# Patient Record
Sex: MALE | Race: WHITE | NOT HISPANIC OR LATINO
[De-identification: names, ages, dates, MRNs, and addresses within clinical notes are randomized per-mention and may not be internally consistent; named-entity substitution may affect disease eponyms.]

---

## 2022-07-07 ENCOUNTER — APPOINTMENT (OUTPATIENT)
Dept: PULMONOLOGY | Facility: CLINIC | Age: 72
End: 2022-07-07

## 2023-06-23 ENCOUNTER — OFFICE (OUTPATIENT)
Dept: URBAN - METROPOLITAN AREA CLINIC 94 | Facility: CLINIC | Age: 73
Setting detail: OPHTHALMOLOGY
End: 2023-06-23
Payer: MEDICARE

## 2023-06-23 DIAGNOSIS — H25.13: ICD-10-CM

## 2023-06-23 DIAGNOSIS — H35.033: ICD-10-CM

## 2023-06-23 PROCEDURE — 92250 FUNDUS PHOTOGRAPHY W/I&R: CPT | Performed by: OPHTHALMOLOGY

## 2023-06-23 PROCEDURE — 99203 OFFICE O/P NEW LOW 30 MIN: CPT | Performed by: OPHTHALMOLOGY

## 2023-06-23 ASSESSMENT — REFRACTION_CURRENTRX
OD_VPRISM_DIRECTION: SV
OS_SPHERE: +0.75
OS_VPRISM_DIRECTION: SV
OD_CYLINDER: -0.50
OS_OVR_VA: 20/
OS_AXIS: 088
OD_VPRISM_DIRECTION: SV
OS_SPHERE: +3.75
OD_SPHERE: +0.50
OS_CYLINDER: -0.75
OD_AXIS: 092
OD_CYLINDER: -0.50
OS_CYLINDER: -0.75
OS_VPRISM_DIRECTION: SV
OD_OVR_VA: 20/
OD_SPHERE: +3.50
OD_AXIS: 092
OS_OVR_VA: 20/
OD_OVR_VA: 20/
OS_AXIS: 087

## 2023-06-23 ASSESSMENT — TONOMETRY
OD_IOP_MMHG: 15
OS_IOP_MMHG: 16

## 2023-06-23 ASSESSMENT — CONFRONTATIONAL VISUAL FIELD TEST (CVF)
OS_FINDINGS: FULL
OD_FINDINGS: FULL

## 2023-06-23 ASSESSMENT — AXIALLENGTH_DERIVED
OS_AL: 25.1414
OD_AL: 27.2275

## 2023-06-23 ASSESSMENT — REFRACTION_AUTOREFRACTION
OD_CYLINDER: -6.25
OD_AXIS: 146
OS_AXIS: 107
OS_SPHERE: -0.25
OS_CYLINDER: -2.25
OD_SPHERE: -2.25

## 2023-06-23 ASSESSMENT — KERATOMETRY
OD_K2POWER_DIOPTERS: 40.75
OS_AXISANGLE_DEGREES: 157
OD_K1POWER_DIOPTERS: 40.50
OS_K1POWER_DIOPTERS: 40.75
OD_AXISANGLE_DEGREES: 020
OS_K2POWER_DIOPTERS: 41.25

## 2023-06-23 ASSESSMENT — SPHEQUIV_DERIVED
OD_SPHEQUIV: -5.375
OS_SPHEQUIV: -1.375

## 2023-06-23 ASSESSMENT — VISUAL ACUITY
OS_BCVA: 20/50-1
OD_BCVA: 20/50+1

## 2023-07-12 ENCOUNTER — OFFICE (OUTPATIENT)
Dept: URBAN - METROPOLITAN AREA CLINIC 94 | Facility: CLINIC | Age: 73
Setting detail: OPHTHALMOLOGY
End: 2023-07-12
Payer: MEDICARE

## 2023-07-12 DIAGNOSIS — H25.13: ICD-10-CM

## 2023-07-12 DIAGNOSIS — H25.12: ICD-10-CM

## 2023-07-12 DIAGNOSIS — H35.033: ICD-10-CM

## 2023-07-12 DIAGNOSIS — H35.373: ICD-10-CM

## 2023-07-12 DIAGNOSIS — H18.513: ICD-10-CM

## 2023-07-12 PROBLEM — H25.11 CATARACT SENILE NUCLEAR SCLEROSIS; RIGHT EYE, LEFT EYE, BOTH EYES: Status: ACTIVE | Noted: 2023-07-12

## 2023-07-12 PROCEDURE — 92286 ANT SGM IMG I&R SPECLR MIC: CPT | Performed by: OPHTHALMOLOGY

## 2023-07-12 PROCEDURE — 99214 OFFICE O/P EST MOD 30 MIN: CPT | Performed by: OPHTHALMOLOGY

## 2023-07-12 PROCEDURE — 92134 CPTRZ OPH DX IMG PST SGM RTA: CPT | Performed by: OPHTHALMOLOGY

## 2023-07-12 PROCEDURE — 92136 OPHTHALMIC BIOMETRY: CPT | Performed by: OPHTHALMOLOGY

## 2023-07-12 ASSESSMENT — REFRACTION_CURRENTRX
OD_OVR_VA: 20/
OS_AXIS: 088
OS_CYLINDER: -0.75
OS_OVR_VA: 20/
OD_OVR_VA: 20/
OD_AXIS: 092
OD_SPHERE: +3.50
OS_CYLINDER: -0.75
OS_VPRISM_DIRECTION: SV
OD_CYLINDER: -0.50
OS_SPHERE: +3.75
OD_CYLINDER: -0.50
OD_VPRISM_DIRECTION: SV
OD_VPRISM_DIRECTION: SV
OD_SPHERE: +0.50
OS_VPRISM_DIRECTION: SV
OD_AXIS: 092
OS_SPHERE: +0.75
OS_OVR_VA: 20/
OS_AXIS: 087

## 2023-07-12 ASSESSMENT — KERATOMETRY
OD_K1POWER_DIOPTERS: 40.00
OD_K2POWER_DIOPTERS: 40.50
OS_K1POWER_DIOPTERS: 40.25
OD_AXISANGLE_DEGREES: 172
OS_K1K2_AVERAGE: 41.125
OS_AXISANGLE2_DEGREES: 165
OD_AXISANGLE_DEGREES: 172
OD_K1POWER_DIOPTERS: 40.00
OS_CYLAXISANGLE_DEGREES: 165
OS_K2POWER_DIOPTERS: 42.00
OD_K2POWER_DIOPTERS: 40.50
OS_AXISANGLE_DEGREES: 165
OS_K2POWER_DIOPTERS: 42.00
OD_K1K2_AVERAGE: 40.25
OS_K1POWER_DIOPTERS: 40.25
OD_AXISANGLE2_DEGREES: 172
OS_AXISANGLE_DEGREES: 165
OS_CYLPOWER_DEGREES: 1.75
OD_CYLPOWER_DEGREES: 0.5
OD_CYLAXISANGLE_DEGREES: 172

## 2023-07-12 ASSESSMENT — VISUAL ACUITY
OD_BCVA: 20/50-1
OS_BCVA: 20/80-1

## 2023-07-12 ASSESSMENT — REFRACTION_AUTOREFRACTION
OS_SPHERE: -0.25
OS_CYLINDER: -2.25
OD_CYLINDER: -6.25
OS_AXIS: 107
OD_SPHERE: -2.25
OD_AXIS: 146

## 2023-07-12 ASSESSMENT — REFRACTION_MANIFEST
OD_SPHERE: -2.25
OS_SPHERE: -0.25
OS_AXIS: 105
OD_AXIS: 145
OS_VA1: 20/50
OD_CYLINDER: -6.25
OS_CYLINDER: -2.25
OD_VA1: 20/80

## 2023-07-12 ASSESSMENT — CORNEAL DYSTROPHY
OD_DYSTROPHY: PRESENT
OS_DYSTROPHY: PRESENT

## 2023-07-12 ASSESSMENT — AXIALLENGTH_DERIVED
OD_AL: 27.4122
OD_AL: 27.4122
OS_AL: 25.0893
OS_AL: 25.0893

## 2023-07-12 ASSESSMENT — SPHEQUIV_DERIVED
OS_SPHEQUIV: -1.375
OS_SPHEQUIV: -1.375
OD_SPHEQUIV: -5.375
OD_SPHEQUIV: -5.375

## 2023-07-12 ASSESSMENT — TONOMETRY
OD_IOP_MMHG: 15
OS_IOP_MMHG: 15

## 2023-08-17 ENCOUNTER — ASC (OUTPATIENT)
Dept: URBAN - METROPOLITAN AREA SURGERY 8 | Facility: SURGERY | Age: 73
Setting detail: OPHTHALMOLOGY
End: 2023-08-17
Payer: MEDICARE

## 2023-08-17 DIAGNOSIS — H25.12: ICD-10-CM

## 2023-08-17 DIAGNOSIS — H52.212: ICD-10-CM

## 2023-08-17 PROCEDURE — A9270 NON-COVERED ITEM OR SERVICE: HCPCS | Performed by: OPHTHALMOLOGY

## 2023-08-17 PROCEDURE — S9986 NOT MEDICALLY NECESSARY SVC: HCPCS | Performed by: OPHTHALMOLOGY

## 2023-08-17 PROCEDURE — FEMTO FEMTOSECOND LASER: Performed by: OPHTHALMOLOGY

## 2023-08-17 PROCEDURE — 66984 XCAPSL CTRC RMVL W/O ECP: CPT | Performed by: OPHTHALMOLOGY

## 2023-08-18 ENCOUNTER — RX ONLY (RX ONLY)
Age: 73
End: 2023-08-18

## 2023-08-18 ENCOUNTER — OFFICE (OUTPATIENT)
Dept: URBAN - METROPOLITAN AREA CLINIC 94 | Facility: CLINIC | Age: 73
Setting detail: OPHTHALMOLOGY
End: 2023-08-18
Payer: MEDICARE

## 2023-08-18 DIAGNOSIS — Z96.1: ICD-10-CM

## 2023-08-18 PROCEDURE — 99024 POSTOP FOLLOW-UP VISIT: CPT | Performed by: PHYSICIAN ASSISTANT

## 2023-08-18 ASSESSMENT — TONOMETRY
OD_IOP_MMHG: 10
OS_IOP_MMHG: 11

## 2023-08-18 ASSESSMENT — REFRACTION_MANIFEST
OS_SPHERE: -0.25
OS_VA1: 20/50
OD_SPHERE: -2.25
OS_CYLINDER: -2.25
OD_AXIS: 145
OD_VA1: 20/80
OS_AXIS: 105
OD_CYLINDER: -6.25

## 2023-08-18 ASSESSMENT — KERATOMETRY
OS_K2POWER_DIOPTERS: 41.25
OS_K1POWER_DIOPTERS: 40.75
OS_AXISANGLE_DEGREES: 082
OD_K1POWER_DIOPTERS: 40.50
OD_K2POWER_DIOPTERS: 40.75
OD_AXISANGLE_DEGREES: 157

## 2023-08-18 ASSESSMENT — REFRACTION_AUTOREFRACTION
OD_AXIS: 107
OD_CYLINDER: -1.00
OS_SPHERE: -0.25
OS_CYLINDER: -0.25
OD_SPHERE: -0.50
OS_AXIS: 093

## 2023-08-18 ASSESSMENT — REFRACTION_CURRENTRX
OS_AXIS: 087
OS_OVR_VA: 20/
OS_OVR_VA: 20/
OD_SPHERE: +0.50
OS_VPRISM_DIRECTION: SV
OD_VPRISM_DIRECTION: SV
OD_OVR_VA: 20/
OD_SPHERE: +3.50
OD_AXIS: 092
OD_VPRISM_DIRECTION: SV
OS_AXIS: 088
OD_OVR_VA: 20/
OD_AXIS: 092
OS_CYLINDER: -0.75
OS_SPHERE: +3.75
OS_CYLINDER: -0.75
OS_VPRISM_DIRECTION: SV
OD_CYLINDER: -0.50
OS_SPHERE: +0.75
OD_CYLINDER: -0.50

## 2023-08-18 ASSESSMENT — AXIALLENGTH_DERIVED
OD_AL: 27.2275
OD_AL: 25.1319
OS_AL: 24.7066
OS_AL: 25.1414

## 2023-08-18 ASSESSMENT — CONFRONTATIONAL VISUAL FIELD TEST (CVF)
OD_FINDINGS: FULL
OS_FINDINGS: FULL

## 2023-08-18 ASSESSMENT — VISUAL ACUITY
OD_BCVA: 20/30
OS_BCVA: 20/60

## 2023-08-18 ASSESSMENT — SPHEQUIV_DERIVED
OS_SPHEQUIV: -1.375
OD_SPHEQUIV: -1
OD_SPHEQUIV: -5.375
OS_SPHEQUIV: -0.375

## 2023-08-18 ASSESSMENT — CORNEAL DYSTROPHY
OD_DYSTROPHY: PRESENT
OS_DYSTROPHY: PRESENT

## 2023-08-23 ENCOUNTER — OFFICE (OUTPATIENT)
Dept: URBAN - METROPOLITAN AREA CLINIC 94 | Facility: CLINIC | Age: 73
Setting detail: OPHTHALMOLOGY
End: 2023-08-23
Payer: MEDICARE

## 2023-08-23 DIAGNOSIS — Z96.1: ICD-10-CM

## 2023-08-23 DIAGNOSIS — H25.11: ICD-10-CM

## 2023-08-23 PROCEDURE — 99024 POSTOP FOLLOW-UP VISIT: CPT | Performed by: PHYSICIAN ASSISTANT

## 2023-08-23 PROCEDURE — 92136 OPHTHALMIC BIOMETRY: CPT | Performed by: PHYSICIAN ASSISTANT

## 2023-08-23 ASSESSMENT — REFRACTION_CURRENTRX
OD_OVR_VA: 20/
OS_CYLINDER: -0.75
OD_OVR_VA: 20/
OD_SPHERE: +0.50
OD_SPHERE: +3.50
OD_VPRISM_DIRECTION: SV
OS_SPHERE: +0.75
OD_CYLINDER: -0.50
OD_AXIS: 092
OS_OVR_VA: 20/
OS_AXIS: 087
OD_VPRISM_DIRECTION: SV
OS_SPHERE: +3.75
OS_VPRISM_DIRECTION: SV
OS_CYLINDER: -0.75
OS_VPRISM_DIRECTION: SV
OD_AXIS: 092
OS_AXIS: 088
OS_OVR_VA: 20/
OD_CYLINDER: -0.50

## 2023-08-23 ASSESSMENT — REFRACTION_AUTOREFRACTION
OD_SPHERE: +0.75
OD_CYLINDER: -1.25
OS_CYLINDER: -1.00
OD_AXIS: 172
OS_SPHERE: 0.00
OS_AXIS: 088

## 2023-08-23 ASSESSMENT — VISUAL ACUITY
OS_BCVA: 20/60+1
OD_BCVA: 20/25-1

## 2023-08-23 ASSESSMENT — REFRACTION_MANIFEST
OS_AXIS: 088
OD_AXIS: 145
OS_CYLINDER: -2.25
OS_SPHERE: -0.25
OD_SPHERE: -2.25
OD_VA1: 20/80
OS_AXIS: 105
OS_CYLINDER: -0.50
OS_VA1: 20/20
OS_VA1: 20/50
OD_CYLINDER: -6.25
OS_SPHERE: PLANO

## 2023-08-23 ASSESSMENT — AXIALLENGTH_DERIVED
OD_AL: 24.4948
OD_AL: 27.0452

## 2023-08-23 ASSESSMENT — TONOMETRY
OD_IOP_MMHG: 11
OS_IOP_MMHG: 13

## 2023-08-23 ASSESSMENT — SPHEQUIV_DERIVED
OS_SPHEQUIV: -0.5
OD_SPHEQUIV: -5.375
OS_SPHEQUIV: -1.375
OD_SPHEQUIV: 0.125

## 2023-08-23 ASSESSMENT — CORNEAL DYSTROPHY
OS_DYSTROPHY: PRESENT
OD_DYSTROPHY: PRESENT

## 2023-08-23 ASSESSMENT — KERATOMETRY
OD_AXISANGLE_DEGREES: 160
OD_K2POWER_DIOPTERS: 41.25
OD_K1POWER_DIOPTERS: 40.75
OS_K1POWER_DIOPTERS: UTP

## 2023-08-23 ASSESSMENT — CONFRONTATIONAL VISUAL FIELD TEST (CVF)
OD_FINDINGS: FULL
OS_FINDINGS: FULL

## 2023-08-30 ENCOUNTER — ASC (OUTPATIENT)
Dept: URBAN - METROPOLITAN AREA SURGERY 8 | Facility: SURGERY | Age: 73
Setting detail: OPHTHALMOLOGY
End: 2023-08-30
Payer: MEDICARE

## 2023-08-30 DIAGNOSIS — H52.211: ICD-10-CM

## 2023-08-30 DIAGNOSIS — H25.11: ICD-10-CM

## 2023-08-30 PROCEDURE — A9270 NON-COVERED ITEM OR SERVICE: HCPCS | Performed by: OPHTHALMOLOGY

## 2023-08-30 PROCEDURE — 66984 XCAPSL CTRC RMVL W/O ECP: CPT | Performed by: OPHTHALMOLOGY

## 2023-08-30 PROCEDURE — FEMTO FEMTOSECOND LASER: Performed by: OPHTHALMOLOGY

## 2023-08-30 PROCEDURE — S9986 NOT MEDICALLY NECESSARY SVC: HCPCS | Performed by: OPHTHALMOLOGY

## 2023-08-31 ENCOUNTER — OFFICE (OUTPATIENT)
Dept: URBAN - METROPOLITAN AREA CLINIC 112 | Facility: CLINIC | Age: 73
Setting detail: OPHTHALMOLOGY
End: 2023-08-31
Payer: MEDICARE

## 2023-08-31 DIAGNOSIS — Z96.1: ICD-10-CM

## 2023-08-31 PROBLEM — H25.11 CATARACT SENILE NUCLEAR SCLEROSIS; RIGHT EYE,: Status: RESOLVED | Noted: 2023-08-18 | Resolved: 2023-08-31

## 2023-08-31 PROCEDURE — 99024 POSTOP FOLLOW-UP VISIT: CPT | Performed by: PHYSICIAN ASSISTANT

## 2023-08-31 ASSESSMENT — REFRACTION_MANIFEST
OS_VA1: 20/20
OS_CYLINDER: -2.25
OS_SPHERE: PLANO
OD_VA1: 20/80
OS_VA1: 20/50
OD_AXIS: 145
OS_AXIS: 105
OD_CYLINDER: -6.25
OS_CYLINDER: -0.50
OS_AXIS: 088
OS_SPHERE: -0.25
OD_SPHERE: -2.25

## 2023-08-31 ASSESSMENT — SPHEQUIV_DERIVED
OD_SPHEQUIV: 0.125
OS_SPHEQUIV: -1.375
OD_SPHEQUIV: -5.375
OS_SPHEQUIV: -0.75

## 2023-08-31 ASSESSMENT — REFRACTION_CURRENTRX
OS_CYLINDER: -0.75
OS_VPRISM_DIRECTION: SV
OD_VPRISM_DIRECTION: SV
OS_CYLINDER: -0.75
OD_VPRISM_DIRECTION: SV
OD_OVR_VA: 20/
OD_CYLINDER: -0.50
OS_AXIS: 087
OS_SPHERE: +0.75
OD_CYLINDER: -0.50
OD_AXIS: 092
OS_OVR_VA: 20/
OS_VPRISM_DIRECTION: SV
OS_OVR_VA: 20/
OD_SPHERE: +3.50
OD_AXIS: 092
OS_SPHERE: +3.75
OD_OVR_VA: 20/
OS_AXIS: 088
OD_SPHERE: +0.50

## 2023-08-31 ASSESSMENT — CONFRONTATIONAL VISUAL FIELD TEST (CVF)
OD_FINDINGS: FULL
OS_FINDINGS: FULL

## 2023-08-31 ASSESSMENT — REFRACTION_AUTOREFRACTION
OD_SPHERE: +0.50
OS_CYLINDER: -0.50
OD_CYLINDER: -0.75
OS_AXIS: 078
OS_SPHERE: -0.50
OD_AXIS: 118

## 2023-08-31 ASSESSMENT — TONOMETRY
OD_IOP_MMHG: 12
OS_IOP_MMHG: 10

## 2023-08-31 ASSESSMENT — CORNEAL DYSTROPHY
OS_DYSTROPHY: PRESENT
OD_DYSTROPHY: PRESENT

## 2023-08-31 ASSESSMENT — SUPERFICIAL PUNCTATE KERATITIS (SPK)
OS_SPK: 1+ 2+
OD_SPK: 1+ 2+

## 2023-08-31 ASSESSMENT — VISUAL ACUITY
OD_BCVA: 20/25+1
OS_BCVA: 20/25-1

## 2023-09-07 ENCOUNTER — OFFICE (OUTPATIENT)
Dept: URBAN - METROPOLITAN AREA CLINIC 112 | Facility: CLINIC | Age: 73
Setting detail: OPHTHALMOLOGY
End: 2023-09-07
Payer: MEDICARE

## 2023-09-07 DIAGNOSIS — Z96.1: ICD-10-CM

## 2023-09-07 DIAGNOSIS — H16.223: ICD-10-CM

## 2023-09-07 PROCEDURE — 99024 POSTOP FOLLOW-UP VISIT: CPT | Performed by: PHYSICIAN ASSISTANT

## 2023-09-07 ASSESSMENT — KERATOMETRY
OS_AXISANGLE_DEGREES: 106
OD_K1POWER_DIOPTERS: 40.50
OD_K2POWER_DIOPTERS: 41.00
OD_AXISANGLE_DEGREES: 010
OS_K1POWER_DIOPTERS: 41.00
OS_K2POWER_DIOPTERS: 41.50

## 2023-09-07 ASSESSMENT — REFRACTION_MANIFEST
OD_SPHERE: -2.25
OD_VA1: 20/80
OD_AXIS: 145
OS_SPHERE: PLANO
OD_CYLINDER: -6.25
OS_CYLINDER: -2.25
OS_VA1: 20/50
OS_CYLINDER: -0.50
OS_AXIS: 105
OS_AXIS: 088
OS_SPHERE: -0.25
OS_VA1: 20/20

## 2023-09-07 ASSESSMENT — REFRACTION_CURRENTRX
OD_VPRISM_DIRECTION: SV
OS_VPRISM_DIRECTION: SV
OS_AXIS: 088
OD_CYLINDER: -0.50
OS_AXIS: 087
OS_CYLINDER: -0.75
OS_SPHERE: +0.75
OD_CYLINDER: -0.50
OS_CYLINDER: -0.75
OD_AXIS: 092
OS_SPHERE: +3.75
OD_AXIS: 092
OD_SPHERE: +3.50
OD_SPHERE: +0.50
OS_VPRISM_DIRECTION: SV
OD_OVR_VA: 20/
OS_OVR_VA: 20/
OD_OVR_VA: 20/
OD_VPRISM_DIRECTION: SV
OS_OVR_VA: 20/

## 2023-09-07 ASSESSMENT — CORNEAL DYSTROPHY
OD_DYSTROPHY: PRESENT
OS_DYSTROPHY: PRESENT

## 2023-09-07 ASSESSMENT — SPHEQUIV_DERIVED
OS_SPHEQUIV: -1.375
OD_SPHEQUIV: -5.375
OD_SPHEQUIV: -0.125
OS_SPHEQUIV: -0.75

## 2023-09-07 ASSESSMENT — REFRACTION_AUTOREFRACTION
OS_CYLINDER: -0.50
OD_AXIS: 089
OS_AXIS: 061
OD_SPHERE: +0.25
OD_CYLINDER: -0.75
OS_SPHERE: -0.50

## 2023-09-07 ASSESSMENT — SUPERFICIAL PUNCTATE KERATITIS (SPK)
OD_SPK: 1+ 2+
OS_SPK: 1+ 2+

## 2023-09-07 ASSESSMENT — VISUAL ACUITY
OD_BCVA: 20/40
OS_BCVA: 20/25

## 2023-09-07 ASSESSMENT — AXIALLENGTH_DERIVED
OS_AL: 24.7662
OD_AL: 24.7005
OD_AL: 27.1665
OS_AL: 25.0375

## 2023-09-07 ASSESSMENT — TONOMETRY
OD_IOP_MMHG: 11
OS_IOP_MMHG: 12

## 2023-09-07 ASSESSMENT — CONFRONTATIONAL VISUAL FIELD TEST (CVF)
OS_FINDINGS: FULL
OD_FINDINGS: FULL

## 2023-10-05 ENCOUNTER — OFFICE (OUTPATIENT)
Dept: URBAN - METROPOLITAN AREA CLINIC 112 | Facility: CLINIC | Age: 73
Setting detail: OPHTHALMOLOGY
End: 2023-10-05
Payer: MEDICARE

## 2023-10-05 DIAGNOSIS — Z96.1: ICD-10-CM

## 2023-10-05 PROCEDURE — 99024 POSTOP FOLLOW-UP VISIT: CPT | Performed by: PHYSICIAN ASSISTANT

## 2023-10-05 ASSESSMENT — TONOMETRY
OD_IOP_MMHG: 16
OS_IOP_MMHG: 16

## 2023-10-05 ASSESSMENT — VISUAL ACUITY
OD_BCVA: 20/40+
OS_BCVA: 20/25

## 2023-10-05 ASSESSMENT — REFRACTION_MANIFEST
OS_AXIS: 088
OD_CYLINDER: -6.25
OS_AXIS: 105
OS_SPHERE: PLANO
OS_CYLINDER: -0.50
OD_AXIS: 145
OS_VA1: 20/50
OD_SPHERE: -2.25
OD_VA1: 20/80
OS_VA1: 20/20
OS_SPHERE: -0.25
OS_CYLINDER: -2.25

## 2023-10-05 ASSESSMENT — REFRACTION_CURRENTRX
OD_VPRISM_DIRECTION: SV
OS_OVR_VA: 20/
OD_CYLINDER: -0.50
OS_SPHERE: +0.75
OD_SPHERE: +0.50
OS_AXIS: 087
OD_SPHERE: +3.50
OS_AXIS: 088
OS_SPHERE: +3.75
OS_CYLINDER: -0.75
OD_CYLINDER: -0.50
OS_VPRISM_DIRECTION: SV
OS_CYLINDER: -0.75
OS_VPRISM_DIRECTION: SV
OD_VPRISM_DIRECTION: SV
OD_AXIS: 092
OD_OVR_VA: 20/
OD_OVR_VA: 20/
OS_OVR_VA: 20/
OD_AXIS: 092

## 2023-10-05 ASSESSMENT — REFRACTION_AUTOREFRACTION
OS_SPHERE: -0.50
OD_SPHERE: +0.25
OS_CYLINDER: -0.50
OS_AXIS: 061
OD_AXIS: 089
OD_CYLINDER: -0.75

## 2023-10-05 ASSESSMENT — CONFRONTATIONAL VISUAL FIELD TEST (CVF)
OS_FINDINGS: FULL
OD_FINDINGS: FULL

## 2023-10-05 ASSESSMENT — KERATOMETRY
OD_K2POWER_DIOPTERS: 41.00
OS_K1POWER_DIOPTERS: 41.00
OD_AXISANGLE_DEGREES: 010
OS_K2POWER_DIOPTERS: 41.50
OS_AXISANGLE_DEGREES: 106
OD_K1POWER_DIOPTERS: 40.50

## 2023-10-05 ASSESSMENT — AXIALLENGTH_DERIVED
OD_AL: 27.1665
OS_AL: 25.0375
OD_AL: 24.7005
OS_AL: 24.7662

## 2023-10-05 ASSESSMENT — CORNEAL DYSTROPHY
OD_DYSTROPHY: PRESENT
OS_DYSTROPHY: PRESENT

## 2023-10-05 ASSESSMENT — SUPERFICIAL PUNCTATE KERATITIS (SPK)
OD_SPK: 1+ 2+
OS_SPK: 1+ 2+

## 2023-10-05 ASSESSMENT — SPHEQUIV_DERIVED
OS_SPHEQUIV: -0.75
OD_SPHEQUIV: -5.375
OD_SPHEQUIV: -0.125
OS_SPHEQUIV: -1.375

## 2024-04-17 ENCOUNTER — INPATIENT (INPATIENT)
Facility: HOSPITAL | Age: 74
LOS: 4 days | Discharge: NURSING, REHAB OR LONG-TERM CARE | DRG: 602 | End: 2024-04-22
Attending: FAMILY MEDICINE | Admitting: STUDENT IN AN ORGANIZED HEALTH CARE EDUCATION/TRAINING PROGRAM
Payer: MEDICARE

## 2024-04-17 VITALS
OXYGEN SATURATION: 100 % | HEIGHT: 66 IN | DIASTOLIC BLOOD PRESSURE: 76 MMHG | SYSTOLIC BLOOD PRESSURE: 146 MMHG | HEART RATE: 73 BPM | TEMPERATURE: 98 F | RESPIRATION RATE: 16 BRPM | WEIGHT: 250 LBS

## 2024-04-17 DIAGNOSIS — L03.119 CELLULITIS OF UNSPECIFIED PART OF LIMB: ICD-10-CM

## 2024-04-17 DIAGNOSIS — Z95.5 PRESENCE OF CORONARY ANGIOPLASTY IMPLANT AND GRAFT: Chronic | ICD-10-CM

## 2024-04-17 LAB
ALBUMIN SERPL ELPH-MCNC: 3.3 G/DL — SIGNIFICANT CHANGE UP (ref 3.3–5.2)
ALP SERPL-CCNC: 113 U/L — SIGNIFICANT CHANGE UP (ref 40–120)
ALT FLD-CCNC: 12 U/L — SIGNIFICANT CHANGE UP
ANION GAP SERPL CALC-SCNC: 13 MMOL/L — SIGNIFICANT CHANGE UP (ref 5–17)
ANISOCYTOSIS BLD QL: SIGNIFICANT CHANGE UP
AST SERPL-CCNC: 15 U/L — SIGNIFICANT CHANGE UP
BASOPHILS # BLD AUTO: 0 K/UL — SIGNIFICANT CHANGE UP (ref 0–0.2)
BASOPHILS NFR BLD AUTO: 0 % — SIGNIFICANT CHANGE UP (ref 0–2)
BILIRUB SERPL-MCNC: 1.5 MG/DL — SIGNIFICANT CHANGE UP (ref 0.4–2)
BUN SERPL-MCNC: 38.3 MG/DL — HIGH (ref 8–20)
CALCIUM SERPL-MCNC: 8.7 MG/DL — SIGNIFICANT CHANGE UP (ref 8.4–10.5)
CHLORIDE SERPL-SCNC: 108 MMOL/L — SIGNIFICANT CHANGE UP (ref 96–108)
CO2 SERPL-SCNC: 20 MMOL/L — LOW (ref 22–29)
CREAT SERPL-MCNC: 2.21 MG/DL — HIGH (ref 0.5–1.3)
EGFR: 31 ML/MIN/1.73M2 — LOW
EOSINOPHIL # BLD AUTO: 0 K/UL — SIGNIFICANT CHANGE UP (ref 0–0.5)
EOSINOPHIL NFR BLD AUTO: 0 % — SIGNIFICANT CHANGE UP (ref 0–6)
GIANT PLATELETS BLD QL SMEAR: PRESENT — SIGNIFICANT CHANGE UP
GLUCOSE SERPL-MCNC: 89 MG/DL — SIGNIFICANT CHANGE UP (ref 70–99)
HCT VFR BLD CALC: 42.1 % — SIGNIFICANT CHANGE UP (ref 39–50)
HGB BLD-MCNC: 13.4 G/DL — SIGNIFICANT CHANGE UP (ref 13–17)
LYMPHOCYTES # BLD AUTO: 0.4 K/UL — LOW (ref 1–3.3)
LYMPHOCYTES # BLD AUTO: 4.3 % — LOW (ref 13–44)
MACROCYTES BLD QL: SIGNIFICANT CHANGE UP
MAGNESIUM SERPL-MCNC: 2.5 MG/DL — SIGNIFICANT CHANGE UP (ref 1.8–2.6)
MANUAL SMEAR VERIFICATION: SIGNIFICANT CHANGE UP
MCHC RBC-ENTMCNC: 31.8 GM/DL — LOW (ref 32–36)
MCHC RBC-ENTMCNC: 32.7 PG — SIGNIFICANT CHANGE UP (ref 27–34)
MCV RBC AUTO: 102.7 FL — HIGH (ref 80–100)
MONOCYTES # BLD AUTO: 0.32 K/UL — SIGNIFICANT CHANGE UP (ref 0–0.9)
MONOCYTES NFR BLD AUTO: 3.5 % — SIGNIFICANT CHANGE UP (ref 2–14)
NEUTROPHILS # BLD AUTO: 8.52 K/UL — HIGH (ref 1.8–7.4)
NEUTROPHILS NFR BLD AUTO: 92.2 % — HIGH (ref 43–77)
NT-PROBNP SERPL-SCNC: HIGH PG/ML (ref 0–300)
OVALOCYTES BLD QL SMEAR: SLIGHT — SIGNIFICANT CHANGE UP
PHOSPHATE SERPL-MCNC: 3.3 MG/DL — SIGNIFICANT CHANGE UP (ref 2.4–4.7)
PLAT MORPH BLD: NORMAL — SIGNIFICANT CHANGE UP
PLATELET # BLD AUTO: 137 K/UL — LOW (ref 150–400)
POIKILOCYTOSIS BLD QL AUTO: SLIGHT — SIGNIFICANT CHANGE UP
POLYCHROMASIA BLD QL SMEAR: SLIGHT — SIGNIFICANT CHANGE UP
POTASSIUM SERPL-MCNC: 4.7 MMOL/L — SIGNIFICANT CHANGE UP (ref 3.5–5.3)
POTASSIUM SERPL-SCNC: 4.7 MMOL/L — SIGNIFICANT CHANGE UP (ref 3.5–5.3)
PROT SERPL-MCNC: 7.4 G/DL — SIGNIFICANT CHANGE UP (ref 6.6–8.7)
RBC # BLD: 4.1 M/UL — LOW (ref 4.2–5.8)
RBC # FLD: 21.5 % — HIGH (ref 10.3–14.5)
RBC BLD AUTO: ABNORMAL
SODIUM SERPL-SCNC: 141 MMOL/L — SIGNIFICANT CHANGE UP (ref 135–145)
TROPONIN T, HIGH SENSITIVITY RESULT: 32 NG/L — SIGNIFICANT CHANGE UP (ref 0–51)
WBC # BLD: 9.24 K/UL — SIGNIFICANT CHANGE UP (ref 3.8–10.5)
WBC # FLD AUTO: 9.24 K/UL — SIGNIFICANT CHANGE UP (ref 3.8–10.5)

## 2024-04-17 PROCEDURE — 70450 CT HEAD/BRAIN W/O DYE: CPT | Mod: 26,MC

## 2024-04-17 PROCEDURE — 99285 EMERGENCY DEPT VISIT HI MDM: CPT

## 2024-04-17 PROCEDURE — 99497 ADVNCD CARE PLAN 30 MIN: CPT | Mod: 25

## 2024-04-17 PROCEDURE — 93970 EXTREMITY STUDY: CPT | Mod: 26

## 2024-04-17 PROCEDURE — 71045 X-RAY EXAM CHEST 1 VIEW: CPT | Mod: 26

## 2024-04-17 PROCEDURE — 99223 1ST HOSP IP/OBS HIGH 75: CPT

## 2024-04-17 PROCEDURE — 73562 X-RAY EXAM OF KNEE 3: CPT | Mod: 26,50

## 2024-04-17 RX ORDER — ALLOPURINOL 300 MG
0.5 TABLET ORAL
Refills: 0 | DISCHARGE

## 2024-04-17 RX ORDER — APIXABAN 2.5 MG/1
5 TABLET, FILM COATED ORAL EVERY 12 HOURS
Refills: 0 | Status: DISCONTINUED | OUTPATIENT
Start: 2024-04-17 | End: 2024-04-22

## 2024-04-17 RX ORDER — SODIUM BICARBONATE 1 MEQ/ML
650 SYRINGE (ML) INTRAVENOUS
Refills: 0 | Status: DISCONTINUED | OUTPATIENT
Start: 2024-04-17 | End: 2024-04-22

## 2024-04-17 RX ORDER — AMIODARONE HYDROCHLORIDE 400 MG/1
1 TABLET ORAL
Refills: 0 | DISCHARGE

## 2024-04-17 RX ORDER — APIXABAN 2.5 MG/1
1 TABLET, FILM COATED ORAL
Refills: 0 | DISCHARGE

## 2024-04-17 RX ORDER — TAMSULOSIN HYDROCHLORIDE 0.4 MG/1
0.4 CAPSULE ORAL AT BEDTIME
Refills: 0 | Status: DISCONTINUED | OUTPATIENT
Start: 2024-04-17 | End: 2024-04-22

## 2024-04-17 RX ORDER — FUROSEMIDE 40 MG
40 TABLET ORAL DAILY
Refills: 0 | Status: DISCONTINUED | OUTPATIENT
Start: 2024-04-17 | End: 2024-04-19

## 2024-04-17 RX ORDER — TAMSULOSIN HYDROCHLORIDE 0.4 MG/1
1 CAPSULE ORAL
Refills: 0 | DISCHARGE

## 2024-04-17 RX ORDER — FOLIC ACID 0.8 MG
1 TABLET ORAL
Refills: 0 | DISCHARGE

## 2024-04-17 RX ORDER — ONDANSETRON 8 MG/1
4 TABLET, FILM COATED ORAL EVERY 8 HOURS
Refills: 0 | Status: DISCONTINUED | OUTPATIENT
Start: 2024-04-17 | End: 2024-04-22

## 2024-04-17 RX ORDER — SIMVASTATIN 20 MG/1
40 TABLET, FILM COATED ORAL AT BEDTIME
Refills: 0 | Status: DISCONTINUED | OUTPATIENT
Start: 2024-04-17 | End: 2024-04-22

## 2024-04-17 RX ORDER — SIMVASTATIN 20 MG/1
1 TABLET, FILM COATED ORAL
Refills: 0 | DISCHARGE

## 2024-04-17 RX ORDER — FOLIC ACID 0.8 MG
1 TABLET ORAL DAILY
Refills: 0 | Status: DISCONTINUED | OUTPATIENT
Start: 2024-04-17 | End: 2024-04-22

## 2024-04-17 RX ORDER — FERROUS SULFATE 325(65) MG
1 TABLET ORAL
Refills: 0 | DISCHARGE

## 2024-04-17 RX ORDER — METOPROLOL TARTRATE 50 MG
50 TABLET ORAL
Refills: 0 | Status: DISCONTINUED | OUTPATIENT
Start: 2024-04-17 | End: 2024-04-20

## 2024-04-17 RX ORDER — LANOLIN ALCOHOL/MO/W.PET/CERES
3 CREAM (GRAM) TOPICAL AT BEDTIME
Refills: 0 | Status: DISCONTINUED | OUTPATIENT
Start: 2024-04-17 | End: 2024-04-22

## 2024-04-17 RX ORDER — ALLOPURINOL 300 MG
50 TABLET ORAL DAILY
Refills: 0 | Status: DISCONTINUED | OUTPATIENT
Start: 2024-04-17 | End: 2024-04-22

## 2024-04-17 RX ORDER — PIPERACILLIN AND TAZOBACTAM 4; .5 G/20ML; G/20ML
3.38 INJECTION, POWDER, LYOPHILIZED, FOR SOLUTION INTRAVENOUS ONCE
Refills: 0 | Status: COMPLETED | OUTPATIENT
Start: 2024-04-17 | End: 2024-04-17

## 2024-04-17 RX ORDER — VANCOMYCIN HCL 1 G
1000 VIAL (EA) INTRAVENOUS ONCE
Refills: 0 | Status: COMPLETED | OUTPATIENT
Start: 2024-04-17 | End: 2024-04-17

## 2024-04-17 RX ORDER — ASCORBIC ACID 60 MG
1 TABLET,CHEWABLE ORAL
Refills: 0 | DISCHARGE

## 2024-04-17 RX ORDER — AMIODARONE HYDROCHLORIDE 400 MG/1
200 TABLET ORAL DAILY
Refills: 0 | Status: DISCONTINUED | OUTPATIENT
Start: 2024-04-17 | End: 2024-04-22

## 2024-04-17 RX ORDER — SODIUM BICARBONATE 1 MEQ/ML
1 SYRINGE (ML) INTRAVENOUS
Refills: 0 | DISCHARGE

## 2024-04-17 RX ORDER — ACETAMINOPHEN 500 MG
650 TABLET ORAL EVERY 6 HOURS
Refills: 0 | Status: DISCONTINUED | OUTPATIENT
Start: 2024-04-17 | End: 2024-04-22

## 2024-04-17 RX ORDER — ASCORBIC ACID 60 MG
500 TABLET,CHEWABLE ORAL DAILY
Refills: 0 | Status: DISCONTINUED | OUTPATIENT
Start: 2024-04-17 | End: 2024-04-22

## 2024-04-17 RX ORDER — CEFAZOLIN SODIUM 1 G
1000 VIAL (EA) INJECTION EVERY 8 HOURS
Refills: 0 | Status: DISCONTINUED | OUTPATIENT
Start: 2024-04-17 | End: 2024-04-22

## 2024-04-17 RX ORDER — FERROUS SULFATE 325(65) MG
325 TABLET ORAL DAILY
Refills: 0 | Status: DISCONTINUED | OUTPATIENT
Start: 2024-04-17 | End: 2024-04-22

## 2024-04-17 RX ADMIN — Medication 250 MILLIGRAM(S): at 15:36

## 2024-04-17 RX ADMIN — APIXABAN 5 MILLIGRAM(S): 2.5 TABLET, FILM COATED ORAL at 23:55

## 2024-04-17 RX ADMIN — SIMVASTATIN 40 MILLIGRAM(S): 20 TABLET, FILM COATED ORAL at 23:55

## 2024-04-17 RX ADMIN — PIPERACILLIN AND TAZOBACTAM 200 GRAM(S): 4; .5 INJECTION, POWDER, LYOPHILIZED, FOR SOLUTION INTRAVENOUS at 18:45

## 2024-04-17 NOTE — H&P ADULT - HISTORY OF PRESENT ILLNESS
74 y/o male with PMH of HTN, HLD, afib on Eliquis, CKD-3 came to the ED complaining of right knee pain and b/l weeping edema of LE. Patient said he tripped and fell 5 days ago, fell on his knees and hit his fore head. He did not seek medical intervention at the time. Said he has been having difficulty walking since then due to pain on his knee R>>L , also noted worsening weeping from his legs. He has no HA, dizziness, blurry vision, nausea, vomiting, chest pain, shortness of breath, palpitation, abdominal pain, change in bowel/urinary habit, fever, chills.      Patient seen and examined before midnight.     74 y/o male with PMH of HTN, HLD, afib on Eliquis, CKD-3 came to the ED complaining of right knee pain and b/l weeping edema of LE. Patient said he tripped and fell 5 days ago, fell on his knees and hit his fore head. He did not seek medical intervention at the time. Said he has been having difficulty walking since then due to pain on his knee R>>L , also noted worsening weeping from his legs. He has no HA, dizziness, blurry vision, nausea, vomiting, chest pain, shortness of breath, palpitation, abdominal pain, change in bowel/urinary habit, fever, chills.

## 2024-04-17 NOTE — ED ADULT NURSE NOTE - CHIEF COMPLAINT QUOTE
pt states he tripped and fell and hit his head 3 days ago. denies loc, headache, vision changes, n/v. has been ambulatory since. c/o right knee pain, BLE edema and wheeping worsening. pt on eliquis

## 2024-04-17 NOTE — ED ADULT TRIAGE NOTE - CHIEF COMPLAINT QUOTE
pt states he tripped and fell and hit his head 3 days ago. denies loc, headache, vision changes, n/v. has been ambulatory since. c/o BLE edema and wheeping worsening. pt on eliquis pt states he tripped and fell and hit his head 3 days ago. denies loc, headache, vision changes, n/v. has been ambulatory since. c/o right knee pain, BLE edema and wheeping worsening. pt on eliquis

## 2024-04-17 NOTE — GOALS OF CARE CONVERSATION - ADVANCED CARE PLANNING - CONVERSATION DETAILS
Advance care directive discussed with patient. He named his brother Nii ([phone]) and sister Briana ([phone]) as the health care proxy. Patient said he has no limitation on his medical management; will like everything done including CPR and intubation if needs be. Patient is FULL CODE.

## 2024-04-17 NOTE — ED ADULT NURSE NOTE - OBJECTIVE STATEMENT
Pt A&O x 4 presents to ED after trip and fall 3 days ago. Pt on blood thinners reports headstrike. Denies LOC. Pt alert, awake and following directions. Strength WNL. Sensory intact. BLLE +3 edema. RLE weeping wound. Pt resting comfortably on stretcher. Bed locked and in lowest position. Call bell within reach. Able to make needs known.

## 2024-04-17 NOTE — H&P ADULT - ASSESSMENT
B/L LE cellulitis   Admit to medical floor   US doppler done: no DVT   Zosyn given in the ED, will continue with Ancef tid   Pain control with Tylenol   ID consulted in the ED     Fall initial encounter   CT head: no acute intracranial finding   PT eval   Fall precaution     Parotid gland lesion   As noted on CT head   US parotid ordered     Hx of afib   Rate controlled   Continue Amiodarone 200mg   Eliquis 5mg bid. Patient advised on the risk of bleeding with fall while on this medication   PT eval     HTN/HLD   Metoprolol ER 50mg bid with holding parameters   Simvastatin 40mg   Lasix 40mg for LE edema   Monitor BP     BPH   Flomax 0.4mg     CKD-3   Cr stable (see markus LOWE)   Sodium bicarb 650mg bid   Monitor renal function     Macrocytic anemia   Continue Folic acid     Thrombocytopenia   Plt: 137  Chronic   Monitor CBC     Supportive   DVT prophylaxis: Eliquis bid   Diet: DASH     Plan of care discussed with patient and ER nurse.    74 y/o male with PMH of HTN, HLD, afib on Eliquis, CKD-3 came to the ED complaining of right knee pain and b/l weeping edema of LE. Patient said he tripped and fell 5 days ago, fell on his knees and hit his fore head.      B/L LE cellulitis   Admit to medical floor   US doppler done: no DVT   Zosyn given in the ED, will continue with Ancef tid   Pain control with Tylenol   ID consulted in the ED     Fall initial encounter   CT head: no acute intracranial finding   PT eval   Fall precaution     Parotid gland lesion   As noted on CT head   Will get CT cervical to evaluate further     Hx of afib   Rate controlled   Continue Amiodarone 200mg   Eliquis 5mg bid. Patient advised on the risk of bleeding with fall while on this medication   PT eval     HTN/HLD   Metoprolol ER 50mg bid with holding parameters   Simvastatin 40mg   Lasix 40mg for LE edema   Monitor BP     BPH   Flomax 0.4mg     CKD-3   Cr stable (see markus LOWE)   Sodium bicarb 650mg bid   Monitor renal function     Macrocytic anemia   Continue Folic acid     Thrombocytopenia   Plt: 137  Chronic   Monitor CBC     Supportive   DVT prophylaxis: Eliquis bid   Diet: DASH     Plan of care discussed with patient and ER nurse.    74 y/o male with PMH of HTN, HLD, afib on Eliquis, CKD-3 came to the ED complaining of right knee pain and b/l weeping edema of LE. Patient said he tripped and fell 5 days ago, fell on his knees and hit his fore head.      B/L LE cellulitis   Admit to medical floor   US doppler done: no DVT   Zosyn given in the ED, will continue with Ancef tid   Pain control with Tylenol   ID consulted in the ED     Fall initial encounter   CT head: no acute intracranial finding   PT eval   Fall precaution     Parotid gland lesion   As noted on CT head   US head/neck soft tissue ordered to  evaluate further     Hx of afib   Rate controlled   Continue Amiodarone 200mg   Eliquis 5mg bid. Patient advised on the risk of bleeding with fall while on this medication   PT eval     HTN/HLD   Metoprolol ER 50mg bid with holding parameters   Simvastatin 40mg   Lasix 40mg for LE edema   Monitor BP     BPH   Flomax 0.4mg     CKD-3   Cr stable (see markus LOWE)   Sodium bicarb 650mg bid   Monitor renal function     Macrocytic anemia   Continue Folic acid     Thrombocytopenia   Plt: 137  Chronic   Monitor CBC     Supportive   DVT prophylaxis: Eliquis bid   Diet: DASH     Plan of care discussed with patient and ER nurse.

## 2024-04-17 NOTE — H&P ADULT - NSICDXPASTMEDICALHX_GEN_ALL_CORE_FT
PAST MEDICAL HISTORY:  Afib     CKD (chronic kidney disease)     HLD (hyperlipidemia)     Hypertension

## 2024-04-17 NOTE — ED ADULT NURSE NOTE - NSFALLRISKINTERV_ED_ALL_ED

## 2024-04-17 NOTE — ED ADULT NURSE REASSESSMENT NOTE - NS ED NURSE REASSESS COMMENT FT1
report received from ZAC rodriguez, pt. axo3 with equal and unlabored respirations. pt. awaiting holding room, any bed. pt. updated on plans of care. pt. spoke to admitting dr and educated on care.

## 2024-04-17 NOTE — H&P ADULT - NSHPPHYSICALEXAM_GEN_ALL_CORE
Vital Signs Last 24 Hrs  T(C): 36.5 (17 Apr 2024 23:15), Max: 36.8 (17 Apr 2024 11:21)  T(F): 97.7 (17 Apr 2024 23:15), Max: 98.3 (17 Apr 2024 11:21)  HR: 58 (17 Apr 2024 23:15) (58 - 73)  BP: 158/83 (17 Apr 2024 23:15) (146/76 - 174/97)  BP(mean): --  RR: 18 (17 Apr 2024 23:15) (16 - 18)  SpO2: 98% (17 Apr 2024 23:15) (96% - 100%)    Parameters below as of 17 Apr 2024 19:57  Patient On (Oxygen Delivery Method): room air

## 2024-04-17 NOTE — ED PROVIDER NOTE - CPE EDP CARDIAC NORM
diabetes mellitus ([address] Diastolic CHF, chronic ([address] juan, Painful diabetic neuropathy ([address] CHAYO Henderson CNP   b complex-C-folic Sabrina Bryant M.D.  Fellow, [address]        normal...

## 2024-04-17 NOTE — ED PROVIDER NOTE - CARE PLAN
1 Principal Discharge DX:	Cellulitis, leg  Secondary Diagnosis:	Osteoarthritis of both knees  Secondary Diagnosis:	Renal insufficiency

## 2024-04-18 LAB
ANION GAP SERPL CALC-SCNC: 12 MMOL/L — SIGNIFICANT CHANGE UP (ref 5–17)
BUN SERPL-MCNC: 34.9 MG/DL — HIGH (ref 8–20)
CALCIUM SERPL-MCNC: 8.6 MG/DL — SIGNIFICANT CHANGE UP (ref 8.4–10.5)
CHLORIDE SERPL-SCNC: 109 MMOL/L — HIGH (ref 96–108)
CO2 SERPL-SCNC: 21 MMOL/L — LOW (ref 22–29)
CREAT SERPL-MCNC: 2.31 MG/DL — HIGH (ref 0.5–1.3)
EGFR: 29 ML/MIN/1.73M2 — LOW
GLUCOSE SERPL-MCNC: 113 MG/DL — HIGH (ref 70–99)
HCT VFR BLD CALC: 40.7 % — SIGNIFICANT CHANGE UP (ref 39–50)
HCV AB S/CO SERPL IA: 0.12 S/CO — SIGNIFICANT CHANGE UP (ref 0–0.99)
HCV AB SERPL-IMP: SIGNIFICANT CHANGE UP
HGB BLD-MCNC: 12.8 G/DL — LOW (ref 13–17)
MCHC RBC-ENTMCNC: 31.4 GM/DL — LOW (ref 32–36)
MCHC RBC-ENTMCNC: 32.6 PG — SIGNIFICANT CHANGE UP (ref 27–34)
MCV RBC AUTO: 103.6 FL — HIGH (ref 80–100)
PLATELET # BLD AUTO: 138 K/UL — LOW (ref 150–400)
POTASSIUM SERPL-MCNC: 4.2 MMOL/L — SIGNIFICANT CHANGE UP (ref 3.5–5.3)
POTASSIUM SERPL-SCNC: 4.2 MMOL/L — SIGNIFICANT CHANGE UP (ref 3.5–5.3)
RBC # BLD: 3.93 M/UL — LOW (ref 4.2–5.8)
RBC # FLD: 21.3 % — HIGH (ref 10.3–14.5)
SODIUM SERPL-SCNC: 142 MMOL/L — SIGNIFICANT CHANGE UP (ref 135–145)
WBC # BLD: 9.19 K/UL — SIGNIFICANT CHANGE UP (ref 3.8–10.5)
WBC # FLD AUTO: 9.19 K/UL — SIGNIFICANT CHANGE UP (ref 3.8–10.5)

## 2024-04-18 PROCEDURE — 99233 SBSQ HOSP IP/OBS HIGH 50: CPT

## 2024-04-18 PROCEDURE — 76536 US EXAM OF HEAD AND NECK: CPT | Mod: 26

## 2024-04-18 RX ADMIN — Medication 50 MILLIGRAM(S): at 18:45

## 2024-04-18 RX ADMIN — Medication 1000 MILLIGRAM(S): at 06:11

## 2024-04-18 RX ADMIN — Medication 650 MILLIGRAM(S): at 18:45

## 2024-04-18 RX ADMIN — AMIODARONE HYDROCHLORIDE 200 MILLIGRAM(S): 400 TABLET ORAL at 06:10

## 2024-04-18 RX ADMIN — Medication 650 MILLIGRAM(S): at 06:10

## 2024-04-18 RX ADMIN — Medication 500 MILLIGRAM(S): at 12:26

## 2024-04-18 RX ADMIN — TAMSULOSIN HYDROCHLORIDE 0.4 MILLIGRAM(S): 0.4 CAPSULE ORAL at 21:27

## 2024-04-18 RX ADMIN — Medication 1 MILLIGRAM(S): at 12:26

## 2024-04-18 RX ADMIN — Medication 50 MILLIGRAM(S): at 12:25

## 2024-04-18 RX ADMIN — Medication 325 MILLIGRAM(S): at 12:26

## 2024-04-18 RX ADMIN — Medication 1000 MILLIGRAM(S): at 13:50

## 2024-04-18 RX ADMIN — Medication 1000 MILLIGRAM(S): at 21:27

## 2024-04-18 RX ADMIN — SIMVASTATIN 40 MILLIGRAM(S): 20 TABLET, FILM COATED ORAL at 21:27

## 2024-04-18 RX ADMIN — Medication 40 MILLIGRAM(S): at 06:11

## 2024-04-18 RX ADMIN — APIXABAN 5 MILLIGRAM(S): 2.5 TABLET, FILM COATED ORAL at 06:10

## 2024-04-18 RX ADMIN — APIXABAN 5 MILLIGRAM(S): 2.5 TABLET, FILM COATED ORAL at 18:46

## 2024-04-18 NOTE — PROGRESS NOTE ADULT - ASSESSMENT
72 y/o male with PMH of HTN, HLD, afib on Eliquis, CKD-3 came to the ED complaining of right knee pain and b/l weeping edema of LE. Patient said he tripped and fell 5 days ago, fell on his knees and hit his fore head.    CKD with HTn and AFib - check ECHo    B/L LE cellulitis  and edema  Admit to medical floor   US doppler done: no DVT   continue with Ancef tid and monitor for clinical improvement   Pain control with Tylenol   ID consulted in the ED per H and P - formal recommendations pending     Fall initial encounter   CT head: no acute intracranial finding   PT eval   Fall precaution     Parotid gland lesion   As noted on CT head   US head/neck soft tissue ordered to  evaluate further     Hx of afib   Rate controlled   Continue Amiodarone 200mg   Eliquis 5mg bid. Patient advised on the risk of bleeding with fall while on this medication   PT eval     HTN/HLD   Metoprolol ER 50mg bid with holding parameters   Simvastatin 40mg   Lasix 40mg for LE edema   Monitor BP     BPH   Flomax 0.4mg     CKD-3 - serum creatinine 2.3 today  Cr stable (per [facility])   Sodium bicarb 650mg bid   Monitor renal function     Macrocytic anemia   Continue Folic acid     Thrombocytopenia   Plt: 137- stable 138  Chronic   Monitor CBC     Supportive   DVT prophylaxis: Eliquis bid   Diet: DASH

## 2024-04-18 NOTE — PATIENT PROFILE ADULT - FALL HARM RISK - HARM RISK INTERVENTIONS

## 2024-04-18 NOTE — PATIENT PROFILE ADULT - FUNCTIONAL ASSESSMENT - BASIC MOBILITY 6.
3-calculated by average/Not able to assess (calculate score using [facility] averaging method)

## 2024-04-18 NOTE — ED ADULT NURSE REASSESSMENT NOTE - NS ED NURSE REASSESS COMMENT FT1
pt. resting in bed awaiting cleaning of a16, report given to ZAC Clarke. pt. axo 3 with equal and unlabored respirations, showing no signs of distress at this time.

## 2024-04-18 NOTE — PHYSICAL THERAPY INITIAL EVALUATION ADULT - ADDITIONAL COMMENTS
Pt lives alone in a [place] with 6 steps to enter. Modified Independent with all PTA, with 2 canes.

## 2024-04-19 ENCOUNTER — RESULT REVIEW (OUTPATIENT)
Age: 74
End: 2024-04-19

## 2024-04-19 LAB
ALBUMIN SERPL ELPH-MCNC: 2.9 G/DL — LOW (ref 3.3–5.2)
ALP SERPL-CCNC: 101 U/L — SIGNIFICANT CHANGE UP (ref 40–120)
ALT FLD-CCNC: 8 U/L — SIGNIFICANT CHANGE UP
ANION GAP SERPL CALC-SCNC: 13 MMOL/L — SIGNIFICANT CHANGE UP (ref 5–17)
AST SERPL-CCNC: 8 U/L — SIGNIFICANT CHANGE UP
BILIRUB SERPL-MCNC: 0.9 MG/DL — SIGNIFICANT CHANGE UP (ref 0.4–2)
BUN SERPL-MCNC: 33.3 MG/DL — HIGH (ref 8–20)
CALCIUM SERPL-MCNC: 8.2 MG/DL — LOW (ref 8.4–10.5)
CHLORIDE SERPL-SCNC: 110 MMOL/L — HIGH (ref 96–108)
CO2 SERPL-SCNC: 20 MMOL/L — LOW (ref 22–29)
CREAT SERPL-MCNC: 2.31 MG/DL — HIGH (ref 0.5–1.3)
EGFR: 29 ML/MIN/1.73M2 — LOW
GLUCOSE SERPL-MCNC: 92 MG/DL — SIGNIFICANT CHANGE UP (ref 70–99)
HCT VFR BLD CALC: 41 % — SIGNIFICANT CHANGE UP (ref 39–50)
HGB BLD-MCNC: 13.2 G/DL — SIGNIFICANT CHANGE UP (ref 13–17)
MCHC RBC-ENTMCNC: 32.2 GM/DL — SIGNIFICANT CHANGE UP (ref 32–36)
MCHC RBC-ENTMCNC: 33 PG — SIGNIFICANT CHANGE UP (ref 27–34)
MCV RBC AUTO: 102.5 FL — HIGH (ref 80–100)
PLATELET # BLD AUTO: 135 K/UL — LOW (ref 150–400)
POTASSIUM SERPL-MCNC: 4.4 MMOL/L — SIGNIFICANT CHANGE UP (ref 3.5–5.3)
POTASSIUM SERPL-SCNC: 4.4 MMOL/L — SIGNIFICANT CHANGE UP (ref 3.5–5.3)
PROT SERPL-MCNC: 6.6 G/DL — SIGNIFICANT CHANGE UP (ref 6.6–8.7)
RBC # BLD: 4 M/UL — LOW (ref 4.2–5.8)
RBC # FLD: 21.3 % — HIGH (ref 10.3–14.5)
SODIUM SERPL-SCNC: 143 MMOL/L — SIGNIFICANT CHANGE UP (ref 135–145)
WBC # BLD: 7.3 K/UL — SIGNIFICANT CHANGE UP (ref 3.8–10.5)
WBC # FLD AUTO: 7.3 K/UL — SIGNIFICANT CHANGE UP (ref 3.8–10.5)

## 2024-04-19 PROCEDURE — 99233 SBSQ HOSP IP/OBS HIGH 50: CPT

## 2024-04-19 PROCEDURE — 93306 TTE W/DOPPLER COMPLETE: CPT | Mod: 26

## 2024-04-19 RX ORDER — FUROSEMIDE 40 MG
40 TABLET ORAL DAILY
Refills: 0 | Status: DISCONTINUED | OUTPATIENT
Start: 2024-04-19 | End: 2024-04-22

## 2024-04-19 RX ADMIN — TAMSULOSIN HYDROCHLORIDE 0.4 MILLIGRAM(S): 0.4 CAPSULE ORAL at 21:22

## 2024-04-19 RX ADMIN — Medication 650 MILLIGRAM(S): at 17:51

## 2024-04-19 RX ADMIN — Medication 50 MILLIGRAM(S): at 06:38

## 2024-04-19 RX ADMIN — Medication 1000 MILLIGRAM(S): at 13:50

## 2024-04-19 RX ADMIN — Medication 40 MILLIGRAM(S): at 12:08

## 2024-04-19 RX ADMIN — Medication 1000 MILLIGRAM(S): at 21:23

## 2024-04-19 RX ADMIN — Medication 1000 MILLIGRAM(S): at 06:38

## 2024-04-19 RX ADMIN — APIXABAN 5 MILLIGRAM(S): 2.5 TABLET, FILM COATED ORAL at 17:54

## 2024-04-19 RX ADMIN — Medication 500 MILLIGRAM(S): at 12:08

## 2024-04-19 RX ADMIN — Medication 650 MILLIGRAM(S): at 06:38

## 2024-04-19 RX ADMIN — Medication 325 MILLIGRAM(S): at 12:07

## 2024-04-19 RX ADMIN — Medication 1 MILLIGRAM(S): at 12:08

## 2024-04-19 RX ADMIN — APIXABAN 5 MILLIGRAM(S): 2.5 TABLET, FILM COATED ORAL at 06:38

## 2024-04-19 RX ADMIN — Medication 50 MILLIGRAM(S): at 17:51

## 2024-04-19 RX ADMIN — Medication 40 MILLIGRAM(S): at 06:39

## 2024-04-19 RX ADMIN — Medication 50 MILLIGRAM(S): at 12:07

## 2024-04-19 RX ADMIN — SIMVASTATIN 40 MILLIGRAM(S): 20 TABLET, FILM COATED ORAL at 21:22

## 2024-04-19 RX ADMIN — AMIODARONE HYDROCHLORIDE 200 MILLIGRAM(S): 400 TABLET ORAL at 06:38

## 2024-04-19 NOTE — PROGRESS NOTE ADULT - ASSESSMENT
72 y/o male with PMH of HTN, HLD, CHF, afib on Eliquis, CKD-3 came to the ED complaining of right knee pain and b/l weeping edema of LE. Patient said he tripped and fell 5 days ago, fell on his knees and hit his fore head.    Acute on Chronic Systolic and Diastolic Heart Failure   ECHO noted   Strict intake / output and daily weight   Change to Lasix to IV  Continue Metoprolol  Cardio Consult     B/L LE cellulitis  and edema  US doppler done: no DVT   continue with Ancef tid     Fall initial encounter   CT head: no acute intracranial finding   PT recommending UMER   Fall precaution     Parotid gland lesion   As noted on CT head   US head/neck soft tissue with hypoechoic lesion in the left parotid gland for which differential consideration includes a pleomorphic adenoma.  Outpatient follow up    Hx of afib   Rate controlled   Continue Amiodarone and Metoprolol   Eliquis 5mg bid. Patient advised on the risk of bleeding with fall while on this medication     HTN/HLD   Metoprolol ER 50mg bid with holding parameters   Simvastatin 40mg     BPH   Flomax 0.4mg     CKD III / IV  [facility]   Sodium bicarb 650mg bid   Monitor renal function     Macrocytic anemia   Continue Folic acid     Thrombocytopenia   Plt: 137- stable 138  Chronic   Monitor CBC     DVT prophylaxis: Patient is on Eliquis    Dispo: UMER once stable.          74 y/o male with PMH of HTN, HLD, CHF, afib on Eliquis, CKD-3 came to the ED complaining of right knee pain and b/l weeping edema of LE. Patient said he tripped and fell 5 days ago, fell on his knees and hit his fore head.    Biventricular Failure (likely acute on chronic)   ECHO noted   Strict intake / output and daily weight   Change to Lasix IV  Continue Metoprolol  Cardio Consult     B/L LE cellulitis  and edema  US doppler done: no DVT   continue with Ancef     Fall initial encounter   CT head: no acute intracranial finding   PT recommending UMER   Fall precaution     Parotid gland lesion   As noted on CT head   US head/neck soft tissue with hypoechoic lesion in the left parotid gland for which differential consideration includes a pleomorphic adenoma.  Outpatient follow up    Hx of afib   Rate controlled   Continue Amiodarone and Metoprolol   Eliquis 5mg bid. Patient advised on the risk of bleeding with fall while on this medication     HTN/HLD   Metoprolol ER 50mg bid with holding parameters   Simvastatin 40mg     BPH   Flomax 0.4mg     CKD III / IV  Cr stable (per [facility])   Sodium bicarb 650mg bid   Monitor renal function     Macrocytic anemia   Continue Folic acid     Thrombocytopenia   Plt: 137- stable 138  Chronic   Monitor CBC     DVT prophylaxis: Patient is on Eliquis    Dispo: UMER once stable.

## 2024-04-20 LAB
ANION GAP SERPL CALC-SCNC: 14 MMOL/L — SIGNIFICANT CHANGE UP (ref 5–17)
BUN SERPL-MCNC: 37.2 MG/DL — HIGH (ref 8–20)
CALCIUM SERPL-MCNC: 7.7 MG/DL — LOW (ref 8.4–10.5)
CHLORIDE SERPL-SCNC: 106 MMOL/L — SIGNIFICANT CHANGE UP (ref 96–108)
CO2 SERPL-SCNC: 23 MMOL/L — SIGNIFICANT CHANGE UP (ref 22–29)
CREAT SERPL-MCNC: 2.74 MG/DL — HIGH (ref 0.5–1.3)
EGFR: 24 ML/MIN/1.73M2 — LOW
GLUCOSE SERPL-MCNC: 87 MG/DL — SIGNIFICANT CHANGE UP (ref 70–99)
MAGNESIUM SERPL-MCNC: 2.2 MG/DL — SIGNIFICANT CHANGE UP (ref 1.6–2.6)
POTASSIUM SERPL-MCNC: 4 MMOL/L — SIGNIFICANT CHANGE UP (ref 3.5–5.3)
POTASSIUM SERPL-SCNC: 4 MMOL/L — SIGNIFICANT CHANGE UP (ref 3.5–5.3)
SODIUM SERPL-SCNC: 143 MMOL/L — SIGNIFICANT CHANGE UP (ref 135–145)

## 2024-04-20 PROCEDURE — 99233 SBSQ HOSP IP/OBS HIGH 50: CPT

## 2024-04-20 RX ORDER — METOPROLOL TARTRATE 50 MG
25 TABLET ORAL
Refills: 0 | Status: DISCONTINUED | OUTPATIENT
Start: 2024-04-21 | End: 2024-04-22

## 2024-04-20 RX ADMIN — APIXABAN 5 MILLIGRAM(S): 2.5 TABLET, FILM COATED ORAL at 05:57

## 2024-04-20 RX ADMIN — Medication 1 APPLICATION(S): at 17:25

## 2024-04-20 RX ADMIN — TAMSULOSIN HYDROCHLORIDE 0.4 MILLIGRAM(S): 0.4 CAPSULE ORAL at 22:20

## 2024-04-20 RX ADMIN — Medication 650 MILLIGRAM(S): at 17:23

## 2024-04-20 RX ADMIN — Medication 1000 MILLIGRAM(S): at 22:20

## 2024-04-20 RX ADMIN — Medication 325 MILLIGRAM(S): at 12:05

## 2024-04-20 RX ADMIN — Medication 650 MILLIGRAM(S): at 05:57

## 2024-04-20 RX ADMIN — AMIODARONE HYDROCHLORIDE 200 MILLIGRAM(S): 400 TABLET ORAL at 14:34

## 2024-04-20 RX ADMIN — Medication 500 MILLIGRAM(S): at 12:05

## 2024-04-20 RX ADMIN — SIMVASTATIN 40 MILLIGRAM(S): 20 TABLET, FILM COATED ORAL at 22:20

## 2024-04-20 RX ADMIN — Medication 40 MILLIGRAM(S): at 14:28

## 2024-04-20 RX ADMIN — Medication 50 MILLIGRAM(S): at 12:06

## 2024-04-20 RX ADMIN — APIXABAN 5 MILLIGRAM(S): 2.5 TABLET, FILM COATED ORAL at 17:24

## 2024-04-20 RX ADMIN — Medication 1000 MILLIGRAM(S): at 14:25

## 2024-04-20 RX ADMIN — Medication 1000 MILLIGRAM(S): at 05:57

## 2024-04-20 RX ADMIN — Medication 1 MILLIGRAM(S): at 12:05

## 2024-04-20 NOTE — CONSULT NOTE ADULT - SUBJECTIVE AND OBJECTIVE BOX
[facility], THE [facility]ENTER                                   [address] [place] [address]                                                      PHONE: [phone]                                                         FAX: [phone]  [email or web address]  ---------------------------------------------------------------------------------------------------------------------------------    Reason for Consult:  LE EDEMA CMP AICD    HPI:  DEIRDRE MORRIS is an 73y Male with Hx of CAD S/P CABG Ischemic CMP AICD s/p TAVR AF on AC presenting to [facility] after a fall found with evidence of LE edema and bilat cellulitis had a TTE done during this hospitalization     < from: TTE W or WO Ultrasound Enhancing Agent (04.19.24 @ 15:50) >  1. Left ventricular systolic function is moderately to severely decreased with an ejection fraction of 36 % by Alcala's method of disks with an ejection fraction visually estimated at 30 to 35 %.   2. Left ventricular cavity is mildly dilated. Septal motion is abnormal consistent with previous cardiac surgery.   3. Entire apex is abnormal.   4. Mild left ventricular hypertrophy.   5. There is severe (grade 3) left ventricular diastolic dysfunction.   6. Based on visual assessment, the right ventricle appears mildly enlarged. Mildly enlarged right ventricular cavity size and moderately to severely reduced systolic function.   7. The left atrium is severely dilated.   8. The right atrium is moderately dilated.   9. The interatrial septum appears intact.  10. Device lead is visualized in the right heart.  11. Symmetric mitral valve leaflet tethering.  12. Mild mitral regurgitation.  13. A TAVR (TAVR) valve replacement is present in the aortic position The prosthetic valve is well seatedwith normal function. No intravalvular or paravalvular regurgitation. Acceptable gradients.  14. Dilated pulmonary artery.  15. Estimated pulmonary artery systolic pressure is 39 mmHg.  16. No pericardial effusion seen.    < end of copied text >    PAST MEDICAL & SURGICAL HISTORY:  Hypertension      CKD (chronic kidney disease)      Afib      HLD (hyperlipidemia)      Presence of stent of CABG          No Known Allergies      MEDICATIONS  (STANDING):  allopurinol 50 milliGRAM(s) Oral daily  aMIOdarone    Tablet 200 milliGRAM(s) Oral daily  apixaban 5 milliGRAM(s) Oral every 12 hours  ascorbic acid 500 milliGRAM(s) Oral daily  ceFAZolin  Injectable. 1000 milliGRAM(s) IV Push every 8 hours  ferrous    sulfate 325 milliGRAM(s) Oral daily  folic acid 1 milliGRAM(s) Oral daily  furosemide   Injectable 40 milliGRAM(s) IV Push daily  metoprolol succinate ER 50 milliGRAM(s) Oral two times a day  simvastatin 40 milliGRAM(s) Oral at bedtime  sodium bicarbonate 650 milliGRAM(s) Oral two times a day  tamsulosin 0.4 milliGRAM(s) Oral at bedtime    MEDICATIONS  (PRN):  acetaminophen     Tablet .. 650 milliGRAM(s) Oral every 6 hours PRN Temp greater or equal to 38C (100.4F), Mild Pain (1 - 3)  aluminum hydroxide/magnesium hydroxide/simethicone Suspension 30 milliLiter(s) Oral every 4 hours PRN Dyspepsia  melatonin 3 milliGRAM(s) Oral at bedtime PRN Insomnia  ondansetron Injectable 4 milliGRAM(s) IV Push every 8 hours PRN Nausea and/or Vomiting      Social History:  Cigarettes:                    Alchohol:                 Illicit Drug Abuse:      REVIEW OF SYSTEMS:    Constitutional: No fever, weight loss or fatigue  Eyes: No eye pain, visual disturbances, or discharge  ENMT:  No difficulty hearing, tinnitus, vertigo; No sinus or throat pain  Neck: No pain or stiffness  Respiratory: No cough, wheezing, chills or hemoptysis  Cardiovascular: No chest pain, palpitations, shortness of breath, dizziness or leg swelling  Gastrointestinal: No abdominal or epigastric pain. No nausea, vomiting or hematemesis; No diarrhea or constipation. No melena or hematochezia.  Genitourinary: No dysuria, frequency, hematuria or incontinence  Rectal: No pain, hemorrhoids or incontinence  Neurological: No headaches, memory loss, loss of strength, numbness or tremors  Skin: No itching, burning, rashes or lesions   Lymph Nodes: No enlarged glands  Endocrine: No heat or cold intolerance; No hair loss  Musculoskeletal: No joint pain or swelling; No muscle, back or extremity pain  Psychiatric: No depression, anxiety, mood swings or difficulty sleeping  Heme/Lymph: No easy bruising or bleeding gums  Allergy and Immunologic: No hives or eczema    Vital Signs Last 24 Hrs  T(C): 36.4 (20 Apr 2024 11:34), Max: 36.8 (19 Apr 2024 23:48)  T(F): 97.6 (20 Apr 2024 11:34), Max: 98.3 (19 Apr 2024 23:48)  HR: 51 (20 Apr 2024 11:34) (51 - 79)  BP: 110/53 (20 Apr 2024 11:34) (110/53 - 148/72)  BP(mean): --  RR: 18 (20 Apr 2024 11:34) (18 - 18)  SpO2: 94% (20 Apr 2024 11:34) (93% - 100%)    Parameters below as of 20 Apr 2024 11:34  Patient On (Oxygen Delivery Method): room air      ICU Vital Signs Last 24 Hrs  DEIRDRE MORRIS  I&O's Detail    19 Apr 2024 07:01  -  20 Apr 2024 07:00  --------------------------------------------------------  IN:  Total IN: 0 mL    OUT:    Voided (mL): 1100 mL  Total OUT: 1100 mL    Total NET: -1100 mL        I&O's Summary    19 Apr 2024 07:01  -  20 Apr 2024 07:00  --------------------------------------------------------  IN: 0 mL / OUT: 1100 mL / NET: -1100 mL      Drug Dosing Weight  DEIRDRE MORRIS      PHYSICAL EXAM:  General: Appears alert and cooperative.  HEENT: Head; normocephalic, atraumatic.  Eyes: Pupils reactive, cornea wnl.  Neck: Supple, no nodes adenopathy, no NVD or carotid bruit or thyromegaly.  CARDIOVASCULAR: Normal S1 and S2, No murmur, rub, gallop or lift.   LUNGS: No rales, rhonchi or wheeze. Normal breath sounds bilaterally.  ABDOMEN: Soft, nontender without mass or organomegaly. bowel sounds normoactive.  EXTREMITIES: No clubbing, cyanosis or edema. Distal pulses wnl.   SKIN: warm and dry with normal turgor.  NEURO: Alert/oriented x 3/normal motor exam. No pathologic reflexes.    PSYCH: normal affect.      >>> <<<  LABS:                        13.2   7.30  )-----------( 135      ( 19 Apr 2024 02:47 )             41.0     04-20    143  |  106  |  37.2<H>  ----------------------------<  87  4.0   |  23.0  |  2.74<H>    Ca    7.7<L>      20 Apr 2024 07:28  Mg     2.2     04-20    TPro  6.6  /  Alb  2.9<L>  /  TBili  0.9  /  DBili  x   /  AST  8   /  ALT  8   /  AlkPhos  101  04-19    DEIRDRE MORRIS        Urinalysis Basic - ( 20 Apr 2024 07:28 )    Color: x / Appearance: x / SG: x / pH: x  Gluc: 87 mg/dL / Ketone: x  / Bili: x / Urobili: x   Blood: x / Protein: x / Nitrite: x   Leuk Esterase: x / RBC: x / WBC x   Sq Epi: x / Non Sq Epi: x / Bacteria: x        RADIOLOGY & ADDITIONAL STUDIES:    INTERPRETATION OF TELEMETRY (personally reviewed):      ACTIVE PROBLEMS:  HEALTH ISSUES - PROBLEM Dx:          Assessment and Plan:    In summary,   DEIRDRE MORRIS is an 73y Male with Hx of CAD S/P CABG Ischemic CMP AICD s/p TAVR AF on AC presenting to [facility] after a fall found with evidence of LE edema and bilat cellulitis    Telemetry monitoring  Continue present cardiac therapy agree with IV lasix 40 mg daily, at discharge change lasix to torsemide 40 mg daily  ATB therapy as per medicine     MATTHEW SILVERIO MD, FACC, OSEI

## 2024-04-20 NOTE — PROGRESS NOTE ADULT - ASSESSMENT
74 y/o male with PMH of HTN, HLD, CHF, afib on Eliquis, CKD-3 came to the ED complaining of right knee pain and b/l weeping edema of LE. Patient said he tripped and fell 5 days ago, fell on his knees and hit his fore head.    Biventricular Failure (likely acute on chronic combined systolic / diastolic heart failure and acute on chronic right heart failure)   ECHO as above   Strict intake / output and daily weight   Changed Lasix to IV 40 mg daily   Continue Metoprolol  Cardio Consult appreciated     B/L LE cellulitis and edema  US doppler with no DVT   continue Ancef     Fall initial encounter   CT head: no acute intracranial finding   PT recommending UMER   Fall precautions     Parotid gland lesion   As noted on CT head   US head/neck soft tissue with hypoechoic lesion in the left parotid gland for which differential consideration includes a pleomorphic adenoma.  Outpatient follow up    Chronic A. Fib    Rate controlled   Continue Amiodarone and Metoprolol   Eliquis 5mg bid. Patient advised on the risk of bleeding with fall while on this medication     HTN/HLD   Metoprolol ER 50mg bid with holding parameters   Simvastatin 40mg     BPH   Flomax 0.4mg     CKD III / IV  Cr stable (per [facility])   Sodium bicarb 650mg bid   Monitor renal function while on Lasix     Macrocytic anemia   Continue Folic acid     Thrombocytopenia   Chronic   Monitor CBC     DVT prophylaxis: Patient is on Eliquis    Dispo: UMER likely next week.

## 2024-04-21 LAB
ANION GAP SERPL CALC-SCNC: 13 MMOL/L — SIGNIFICANT CHANGE UP (ref 5–17)
BUN SERPL-MCNC: 43.9 MG/DL — HIGH (ref 8–20)
CA-I BLD-SCNC: 1.01 MMOL/L — LOW (ref 1.15–1.33)
CALCIUM SERPL-MCNC: 7.9 MG/DL — LOW (ref 8.4–10.5)
CHLORIDE SERPL-SCNC: 107 MMOL/L — SIGNIFICANT CHANGE UP (ref 96–108)
CO2 SERPL-SCNC: 23 MMOL/L — SIGNIFICANT CHANGE UP (ref 22–29)
CREAT SERPL-MCNC: 2.45 MG/DL — HIGH (ref 0.5–1.3)
EGFR: 27 ML/MIN/1.73M2 — LOW
GLUCOSE SERPL-MCNC: 77 MG/DL — SIGNIFICANT CHANGE UP (ref 70–99)
MAGNESIUM SERPL-MCNC: 2.2 MG/DL — SIGNIFICANT CHANGE UP (ref 1.6–2.6)
POTASSIUM SERPL-MCNC: 3.9 MMOL/L — SIGNIFICANT CHANGE UP (ref 3.5–5.3)
POTASSIUM SERPL-SCNC: 3.9 MMOL/L — SIGNIFICANT CHANGE UP (ref 3.5–5.3)
SODIUM SERPL-SCNC: 143 MMOL/L — SIGNIFICANT CHANGE UP (ref 135–145)

## 2024-04-21 PROCEDURE — 99232 SBSQ HOSP IP/OBS MODERATE 35: CPT

## 2024-04-21 RX ORDER — CALCIUM GLUCONATE 100 MG/ML
2 VIAL (ML) INTRAVENOUS ONCE
Refills: 0 | Status: COMPLETED | OUTPATIENT
Start: 2024-04-21 | End: 2024-04-21

## 2024-04-21 RX ORDER — POTASSIUM CHLORIDE 20 MEQ
20 PACKET (EA) ORAL ONCE
Refills: 0 | Status: COMPLETED | OUTPATIENT
Start: 2024-04-21 | End: 2024-04-21

## 2024-04-21 RX ADMIN — TAMSULOSIN HYDROCHLORIDE 0.4 MILLIGRAM(S): 0.4 CAPSULE ORAL at 21:57

## 2024-04-21 RX ADMIN — Medication 25 MILLIGRAM(S): at 18:00

## 2024-04-21 RX ADMIN — Medication 25 MILLIGRAM(S): at 05:57

## 2024-04-21 RX ADMIN — Medication 1 APPLICATION(S): at 17:54

## 2024-04-21 RX ADMIN — SIMVASTATIN 40 MILLIGRAM(S): 20 TABLET, FILM COATED ORAL at 21:57

## 2024-04-21 RX ADMIN — Medication 650 MILLIGRAM(S): at 05:57

## 2024-04-21 RX ADMIN — APIXABAN 5 MILLIGRAM(S): 2.5 TABLET, FILM COATED ORAL at 05:57

## 2024-04-21 RX ADMIN — Medication 1 MILLIGRAM(S): at 15:01

## 2024-04-21 RX ADMIN — APIXABAN 5 MILLIGRAM(S): 2.5 TABLET, FILM COATED ORAL at 18:01

## 2024-04-21 RX ADMIN — Medication 50 MILLIGRAM(S): at 15:11

## 2024-04-21 RX ADMIN — Medication 500 MILLIGRAM(S): at 15:01

## 2024-04-21 RX ADMIN — Medication 40 MILLIGRAM(S): at 05:57

## 2024-04-21 RX ADMIN — Medication 1000 MILLIGRAM(S): at 21:58

## 2024-04-21 RX ADMIN — Medication 1000 MILLIGRAM(S): at 15:01

## 2024-04-21 RX ADMIN — Medication 200 GRAM(S): at 10:37

## 2024-04-21 RX ADMIN — Medication 1000 MILLIGRAM(S): at 05:57

## 2024-04-21 RX ADMIN — Medication 325 MILLIGRAM(S): at 15:01

## 2024-04-21 RX ADMIN — Medication 650 MILLIGRAM(S): at 18:01

## 2024-04-21 RX ADMIN — Medication 20 MILLIEQUIVALENT(S): at 18:01

## 2024-04-21 RX ADMIN — AMIODARONE HYDROCHLORIDE 200 MILLIGRAM(S): 400 TABLET ORAL at 05:57

## 2024-04-21 NOTE — PROGRESS NOTE ADULT - ASSESSMENT
72 y/o male with PMH of HTN, HLD, CHF, afib on Eliquis, CKD-3 came to the ED complaining of right knee pain and b/l weeping edema of LE. Patient said he tripped and fell 5 days ago, fell on his knees and hit his fore head.    Biventricular Failure (likely acute on chronic combined systolic / diastolic heart failure and acute on chronic right heart failure)   ECHO as above   Strict intake / output and daily weight   Changed Lasix to IV 40 mg daily   Continue Metoprolol  Cardio Consult appreciated     B/L LE cellulitis and edema  US doppler with no DVT   continue Ancef     Fall initial encounter   CT head: no acute intracranial finding   PT recommending UMER   Fall precautions     Parotid gland lesion   As noted on CT head   US head/neck soft tissue with hypoechoic lesion in the left parotid gland for which differential consideration includes a pleomorphic adenoma.  Outpatient follow up    Chronic A. Fib    Rate controlled   Continue Amiodarone and Metoprolol   Eliquis 5mg bid. Patient advised on the risk of bleeding with fall while on this medication     HTN/HLD   Metoprolol ER 50mg bid with holding parameters   Simvastatin 40mg     BPH   Flomax 0.4mg     CKD III / IV  Cr stable (per [facility])   Sodium bicarb 650mg bid   Monitor renal function while on Lasix     Macrocytic anemia   Continue Folic acid     Thrombocytopenia   Chronic   Monitor CBC     Hypocalcemia  Replete     DVT prophylaxis: Patient is on Eliquis    Dispo: [facility] likely next week.

## 2024-04-22 ENCOUNTER — TRANSCRIPTION ENCOUNTER (OUTPATIENT)
Age: 74
End: 2024-04-22

## 2024-04-22 VITALS
RESPIRATION RATE: 20 BRPM | OXYGEN SATURATION: 98 % | SYSTOLIC BLOOD PRESSURE: 124 MMHG | HEART RATE: 57 BPM | TEMPERATURE: 98 F | DIASTOLIC BLOOD PRESSURE: 70 MMHG

## 2024-04-22 LAB
ANION GAP SERPL CALC-SCNC: 12 MMOL/L — SIGNIFICANT CHANGE UP (ref 5–17)
BUN SERPL-MCNC: 43.4 MG/DL — HIGH (ref 8–20)
CALCIUM SERPL-MCNC: 8.1 MG/DL — LOW (ref 8.4–10.5)
CHLORIDE SERPL-SCNC: 106 MMOL/L — SIGNIFICANT CHANGE UP (ref 96–108)
CO2 SERPL-SCNC: 24 MMOL/L — SIGNIFICANT CHANGE UP (ref 22–29)
CREAT SERPL-MCNC: 2.73 MG/DL — HIGH (ref 0.5–1.3)
EGFR: 24 ML/MIN/1.73M2 — LOW
GLUCOSE SERPL-MCNC: 88 MG/DL — SIGNIFICANT CHANGE UP (ref 70–99)
HCT VFR BLD CALC: 36.6 % — LOW (ref 39–50)
HGB BLD-MCNC: 12.2 G/DL — LOW (ref 13–17)
MAGNESIUM SERPL-MCNC: 2.3 MG/DL — SIGNIFICANT CHANGE UP (ref 1.6–2.6)
MCHC RBC-ENTMCNC: 33.3 GM/DL — SIGNIFICANT CHANGE UP (ref 32–36)
MCHC RBC-ENTMCNC: 33.3 PG — SIGNIFICANT CHANGE UP (ref 27–34)
MCV RBC AUTO: 100 FL — SIGNIFICANT CHANGE UP (ref 80–100)
MRSA PCR RESULT.: SIGNIFICANT CHANGE UP
PLATELET # BLD AUTO: 137 K/UL — LOW (ref 150–400)
POTASSIUM SERPL-MCNC: 4.1 MMOL/L — SIGNIFICANT CHANGE UP (ref 3.5–5.3)
POTASSIUM SERPL-SCNC: 4.1 MMOL/L — SIGNIFICANT CHANGE UP (ref 3.5–5.3)
RBC # BLD: 3.66 M/UL — LOW (ref 4.2–5.8)
RBC # FLD: 20.7 % — HIGH (ref 10.3–14.5)
S AUREUS DNA NOSE QL NAA+PROBE: SIGNIFICANT CHANGE UP
SODIUM SERPL-SCNC: 142 MMOL/L — SIGNIFICANT CHANGE UP (ref 135–145)
WBC # BLD: 6.11 K/UL — SIGNIFICANT CHANGE UP (ref 3.8–10.5)
WBC # FLD AUTO: 6.11 K/UL — SIGNIFICANT CHANGE UP (ref 3.8–10.5)

## 2024-04-22 PROCEDURE — C8929: CPT

## 2024-04-22 PROCEDURE — 97163 PT EVAL HIGH COMPLEX 45 MIN: CPT

## 2024-04-22 PROCEDURE — 70450 CT HEAD/BRAIN W/O DYE: CPT | Mod: MC

## 2024-04-22 PROCEDURE — 85027 COMPLETE CBC AUTOMATED: CPT

## 2024-04-22 PROCEDURE — 99285 EMERGENCY DEPT VISIT HI MDM: CPT | Mod: 25

## 2024-04-22 PROCEDURE — 84100 ASSAY OF PHOSPHORUS: CPT

## 2024-04-22 PROCEDURE — 99239 HOSP IP/OBS DSCHRG MGMT >30: CPT

## 2024-04-22 PROCEDURE — 80053 COMPREHEN METABOLIC PANEL: CPT

## 2024-04-22 PROCEDURE — 87641 MR-STAPH DNA AMP PROBE: CPT

## 2024-04-22 PROCEDURE — 83880 ASSAY OF NATRIURETIC PEPTIDE: CPT

## 2024-04-22 PROCEDURE — 93970 EXTREMITY STUDY: CPT

## 2024-04-22 PROCEDURE — 82330 ASSAY OF CALCIUM: CPT

## 2024-04-22 PROCEDURE — 83735 ASSAY OF MAGNESIUM: CPT

## 2024-04-22 PROCEDURE — 85025 COMPLETE CBC W/AUTO DIFF WBC: CPT

## 2024-04-22 PROCEDURE — 80048 BASIC METABOLIC PNL TOTAL CA: CPT

## 2024-04-22 PROCEDURE — 36415 COLL VENOUS BLD VENIPUNCTURE: CPT

## 2024-04-22 PROCEDURE — 76536 US EXAM OF HEAD AND NECK: CPT

## 2024-04-22 PROCEDURE — 96374 THER/PROPH/DIAG INJ IV PUSH: CPT

## 2024-04-22 PROCEDURE — 84484 ASSAY OF TROPONIN QUANT: CPT

## 2024-04-22 PROCEDURE — 71045 X-RAY EXAM CHEST 1 VIEW: CPT

## 2024-04-22 PROCEDURE — 87640 STAPH A DNA AMP PROBE: CPT

## 2024-04-22 PROCEDURE — 86803 HEPATITIS C AB TEST: CPT

## 2024-04-22 PROCEDURE — 73562 X-RAY EXAM OF KNEE 3: CPT

## 2024-04-22 PROCEDURE — 96375 TX/PRO/DX INJ NEW DRUG ADDON: CPT

## 2024-04-22 RX ORDER — CEPHALEXIN 500 MG
1 CAPSULE ORAL
Qty: 4 | Refills: 0
Start: 2024-04-22 | End: 2024-04-23

## 2024-04-22 RX ORDER — METOPROLOL TARTRATE 50 MG
50 TABLET ORAL DAILY
Refills: 0 | Status: DISCONTINUED | OUTPATIENT
Start: 2024-04-22 | End: 2024-04-22

## 2024-04-22 RX ORDER — FUROSEMIDE 40 MG
1 TABLET ORAL
Refills: 0 | DISCHARGE

## 2024-04-22 RX ORDER — CHLORHEXIDINE GLUCONATE 213 G/1000ML
1 SOLUTION TOPICAL DAILY
Refills: 0 | Status: DISCONTINUED | OUTPATIENT
Start: 2024-04-22 | End: 2024-04-22

## 2024-04-22 RX ORDER — METOPROLOL TARTRATE 50 MG
1 TABLET ORAL
Refills: 0 | DISCHARGE

## 2024-04-22 RX ORDER — METOPROLOL TARTRATE 50 MG
1 TABLET ORAL
Qty: 0 | Refills: 0 | DISCHARGE
Start: 2024-04-22

## 2024-04-22 RX ORDER — METOPROLOL TARTRATE 50 MG
50 TABLET ORAL
Refills: 0 | Status: DISCONTINUED | OUTPATIENT
Start: 2024-04-22 | End: 2024-04-22

## 2024-04-22 RX ADMIN — Medication 50 MILLIGRAM(S): at 11:59

## 2024-04-22 RX ADMIN — CHLORHEXIDINE GLUCONATE 1 APPLICATION(S): 213 SOLUTION TOPICAL at 11:59

## 2024-04-22 RX ADMIN — Medication 1000 MILLIGRAM(S): at 06:25

## 2024-04-22 RX ADMIN — Medication 325 MILLIGRAM(S): at 11:58

## 2024-04-22 RX ADMIN — Medication 650 MILLIGRAM(S): at 06:26

## 2024-04-22 RX ADMIN — Medication 500 MILLIGRAM(S): at 11:58

## 2024-04-22 RX ADMIN — AMIODARONE HYDROCHLORIDE 200 MILLIGRAM(S): 400 TABLET ORAL at 06:26

## 2024-04-22 RX ADMIN — Medication 1 APPLICATION(S): at 13:30

## 2024-04-22 RX ADMIN — Medication 40 MILLIGRAM(S): at 06:26

## 2024-04-22 RX ADMIN — Medication 25 MILLIGRAM(S): at 06:26

## 2024-04-22 RX ADMIN — APIXABAN 5 MILLIGRAM(S): 2.5 TABLET, FILM COATED ORAL at 06:25

## 2024-04-22 RX ADMIN — Medication 1000 MILLIGRAM(S): at 13:30

## 2024-04-22 RX ADMIN — Medication 1 MILLIGRAM(S): at 11:58

## 2024-04-22 NOTE — DISCHARGE NOTE PROVIDER - CARE PROVIDERS DIRECT ADDRESSES
[email or web address],[email or web address]

## 2024-04-22 NOTE — DISCHARGE NOTE PROVIDER - NSDCMRMEDTOKEN_GEN_ALL_CORE_FT
allopurinol 100 mg oral tablet: 0.5 orally once a day  amiodarone 200 mg oral tablet: 1 tab(s) orally once a day  ascorbic acid 500 mg oral tablet: 1 tab(s) orally once a day  cephalexin 500 mg oral tablet: 1 tab(s) orally 2 times a day x 2 days  Eliquis 5 mg oral tablet: 1 tab(s) orally 2 times a day  ferrous sulfate 325 mg (65 mg elemental iron) oral tablet: 1 tab(s) orally once a day  folic acid 1 mg oral tablet: 1 tab(s) orally once a day  metoprolol succinate 50 mg oral tablet, extended release: 1 tab(s) orally 2 times a day  silver sulfADIAZINE 1% topical cream: 1 Apply topically to affected area once a day  simvastatin 40 mg oral tablet: 1 tab(s) orally once a day (at bedtime)  sodium bicarbonate 650 mg oral tablet: 1 tab(s) orally 2 times a day  tamsulosin 0.4 mg oral capsule: 1 cap(s) orally once a day (at bedtime)  torsemide 10 mg oral tablet: 3 tab(s) orally every 12 hours Starting 4/23/24

## 2024-04-22 NOTE — DISCHARGE NOTE PROVIDER - NSDCCPCAREPLAN_GEN_ALL_CORE_FT
PRINCIPAL DISCHARGE DIAGNOSIS  Diagnosis: Biventricular heart failure  Assessment and Plan of Treatment: Acute on Chronic.  Continue medications as prescribed.  Follow up with Cardio.      SECONDARY DISCHARGE DIAGNOSES  Diagnosis: Lower extremity cellulitis  Assessment and Plan of Treatment: Continue antibiotics as prescribed.  Follow up with PMD.

## 2024-04-22 NOTE — DISCHARGE NOTE NURSING/CASE MANAGEMENT/SOCIAL WORK - PATIENT PORTAL LINK FT
You can access the FollowMyHealth Patient Portal offered by [facility] by registering at the following website: [email or web address]. By joining [employer]’s FollowMyHealth portal, you will also be able to view your health information using other applications (apps) compatible with our system.

## 2024-04-22 NOTE — DISCHARGE NOTE PROVIDER - ATTENDING DISCHARGE PHYSICAL EXAMINATION:
Vital Signs   T(C): 36.4 (22 Apr 2024 08:32), Max: 36.5 (22 Apr 2024 00:00)  T(F): 97.6 (22 Apr 2024 08:32), Max: 97.7 (22 Apr 2024 00:00)  HR: 56 (22 Apr 2024 08:32) (49 - 61)  BP: 126/65 (22 Apr 2024 08:32) (114/59 - 146/75)  RR: 18 (22 Apr 2024 08:32) (18 - 20)  SpO2: 97% (22 Apr 2024 08:32) (93% - 97%)  Parameters below as of 22 Apr 2024 08:32  Patient On (Oxygen Delivery Method): room air  General: Elderly male lying in bed comfortably. No acute distress  HEENT: EOMI. Clear conjunctivae. Moist mucus membrane  Neck: Supple.   Chest: Good air entry. No wheezing, rales or rhonchi.   Heart: Normal S1 & S2. RRR.   Abdomen: Non distended. Soft. Non-tender. + BS  Ext: 2+ pedal edema. No calf tenderness. Superficial ulcers. Chronic skin changes.   Neuro: Awake and alert. No focal deficit. Speech clear.   Skin: Warm and Dry  Psychiatry: Normal mood and affect

## 2024-04-22 NOTE — PROGRESS NOTE ADULT - SUBJECTIVE AND OBJECTIVE BOX
[facility] - [facility]                                   [address] [place] [address]                                                      PHONE: [phone]                                                         FAX: [phone]  [email or web address]  ---------------------------------------------------------------------------------------------------------------------------------    Overnight events/patient complaints:      NAD     No Known Allergies    MEDICATIONS  (STANDING):  allopurinol 50 milliGRAM(s) Oral daily  aMIOdarone    Tablet 200 milliGRAM(s) Oral daily  apixaban 5 milliGRAM(s) Oral every 12 hours  ascorbic acid 500 milliGRAM(s) Oral daily  ceFAZolin  Injectable. 1000 milliGRAM(s) IV Push every 8 hours  ferrous    sulfate 325 milliGRAM(s) Oral daily  folic acid 1 milliGRAM(s) Oral daily  furosemide   Injectable 40 milliGRAM(s) IV Push daily  metoprolol succinate ER 50 milliGRAM(s) Oral two times a day  silver sulfADIAZINE 1% Cream 1 Application(s) Topical daily  simvastatin 40 milliGRAM(s) Oral at bedtime  sodium bicarbonate 650 milliGRAM(s) Oral two times a day  tamsulosin 0.4 milliGRAM(s) Oral at bedtime    MEDICATIONS  (PRN):  acetaminophen     Tablet .. 650 milliGRAM(s) Oral every 6 hours PRN Temp greater or equal to 38C (100.4F), Mild Pain (1 - 3)  aluminum hydroxide/magnesium hydroxide/simethicone Suspension 30 milliLiter(s) Oral every 4 hours PRN Dyspepsia  melatonin 3 milliGRAM(s) Oral at bedtime PRN Insomnia  ondansetron Injectable 4 milliGRAM(s) IV Push every 8 hours PRN Nausea and/or Vomiting      Vital Signs Last 24 Hrs  T(C): 36.4 (22 Apr 2024 08:32), Max: 36.5 (22 Apr 2024 00:00)  T(F): 97.6 (22 Apr 2024 08:32), Max: 97.7 (22 Apr 2024 00:00)  HR: 56 (22 Apr 2024 08:32) (49 - 61)  BP: 126/65 (22 Apr 2024 08:32) (114/59 - 146/75)  BP(mean): --  RR: 18 (22 Apr 2024 08:32) (18 - 20)  SpO2: 97% (22 Apr 2024 08:32) (93% - 97%)    Parameters below as of 22 Apr 2024 08:32  Patient On (Oxygen Delivery Method): room air      ICU Vital Signs Last 24 Hrs  DEIRDRE MORRIS  I&O's Detail    21 Apr 2024 07:01  -  22 Apr 2024 07:00  --------------------------------------------------------  IN:    Oral Fluid: 1120 mL  Total IN: 1120 mL    OUT:  Total OUT: 0 mL    Total NET: 1120 mL        I&O's Summary    21 Apr 2024 07:01  -  22 Apr 2024 07:00  --------------------------------------------------------  IN: 1120 mL / OUT: 0 mL / NET: 1120 mL      Drug Dosing Weight  DEIRDRE MORRIS      PHYSICAL EXAM:  General: Appears well developed, alert and cooperative.  HEENT: Head; normocephalic, atraumatic.  Eyes: Pupils reactive, cornea wnl.  Neck: Supple, no nodes adenopathy, no NVD or carotid bruit or thyromegaly.  CARDIOVASCULAR: Normal S1 and S2, 1/6 murmur, rub, gallop or lift.   LUNGS: No rales, rhonchi or wheeze. Normal breath sounds bilaterally.  ABDOMEN: Soft, nontender without mass or organomegaly. bowel sounds normoactive.  EXTREMITIES: No clubbing, cyanosis or edema. Distal pulses wnl.   SKIN: warm and dry with normal turgor.  NEURO: Alert/oriented x 3/normal motor exam. No pathologic reflexes.    PSYCH: normal affect.        LABS:                        12.2   6.11  )-----------( 137      ( 22 Apr 2024 04:51 )             36.6     04-22    142  |  106  |  43.4<H>  ----------------------------<  88  4.1   |  24.0  |  2.73<H>    Ca    8.1<L>      22 Apr 2024 04:51  Mg     2.3     04-22      DEIRDRE MORRIS        Urinalysis Basic - ( 22 Apr 2024 04:51 )    Color: x / Appearance: x / SG: x / pH: x  Gluc: 88 mg/dL / Ketone: x  / Bili: x / Urobili: x   Blood: x / Protein: x / Nitrite: x   Leuk Esterase: x / RBC: x / WBC x   Sq Epi: x / Non Sq Epi: x / Bacteria: x        RADIOLOGY & ADDITIONAL STUDIES:    INTERPRETATION OF TELEMETRY (personally reviewed):    < from: TTE W or WO Ultrasound Enhancing Agent (04.19.24 @ 15:50) >     CONCLUSIONS:      1. Left ventricular systolic function is moderately to severely decreased with an ejection fraction of 36 % by Alcala's method of disks with an ejection fraction visually estimated at 30 to 35 %.   2. Left ventricular cavity is mildly dilated. Septal motion is abnormal consistent with previous cardiac surgery.   3. Entire apex is abnormal.   4. Mild left ventricular hypertrophy.   5. There is severe (grade 3) left ventricular diastolic dysfunction.   6. Based on visual assessment, the right ventricle appears mildly enlarged. Mildly enlarged right ventricular cavity size and moderately to severely reduced systolic function.   7. The left atrium is severely dilated.   8. The right atrium is moderately dilated.   9. The interatrial septum appears intact.  10. Device lead is visualized in the right heart.  11. Symmetric mitral valve leaflet tethering.  12. Mild mitral regurgitation.  13. A TAVR (TAVR) valve replacement is present in the aortic position The prosthetic valve is well seatedwith normal function. No intravalvular or paravalvular regurgitation. Acceptable gradients.  14. Dilated pulmonary artery.  15. Estimated pulmonary artery systolic pressure is 39 mmHg.  16. No pericardial effusion seen.    < end of copied text >      73y Male with Hx of CAD S/P CABG Ischemic CMP EF ~ 35% AICD severe s/p TAVR PAF on AC CKD who presents with acute on chronic HFrEF in setting of CKD     Volume stable stable     Plan  Change IV Lasix to Torsemide 30 mg po BID   Continue Toprol XL   NOAC and Amiodarone therapy     DC planning 
Cellulitis of extremity    HPI:  Patient seen and examined before midnight.     74 y/o male with PMH of HTN, HLD, afib on Eliquis, CKD-3 came to the ED complaining of right knee pain and b/l weeping edema of LE. Patient said he tripped and fell 5 days ago, fell on his knees and hit his fore head. He did not seek medical intervention at the time. Said he has been having difficulty walking since then due to pain on his knee R>>L , also noted worsening weeping from his legs. He has no HA, dizziness, blurry vision, nausea, vomiting, chest pain, shortness of breath, palpitation, abdominal pain, change in bowel/urinary habit, fever, chills. (17 Apr 2024 23:32)    Interval History:  Patient was seen and examined at bedside around 9:30 am.  Leg swelling is improving.   Denies chest pain or palpitations.  No SOB at rest.      ROS:  As per interval history otherwise unremarkable.    PHYSICAL EXAM:  Vital Signs   T(C): 36.4 (20 Apr 2024 11:34), Max: 36.8 (19 Apr 2024 23:48)  T(F): 97.6 (20 Apr 2024 11:34), Max: 98.3 (19 Apr 2024 23:48)  HR: 51 (20 Apr 2024 11:34) (51 - 79)  BP: 110/53 (20 Apr 2024 11:34) (110/53 - 148/72)  RR: 18 (20 Apr 2024 11:34) (18 - 18)  SpO2: 94% (20 Apr 2024 11:34) (93% - 100%)  Parameters below as of 20 Apr 2024 11:34  Patient On (Oxygen Delivery Method): room air  General: Elderly male lying in bed comfortably. No acute distress  HEENT: EOMI. Clear conjunctivae. Moist mucus membrane  Neck: Supple.   Chest: Good air entry. No wheezing, rales or rhonchi.   Heart: Normal S1 & S2. RRR.   Abdomen: Non distended. Soft. Non-tender. + BS  Ext: 3+ pedal edema. No calf tenderness. Superficial ulcers. Chronic skin changes.   Neuro: Awake and alert. No focal deficit. Speech clear.   Skin: Warm and Dry  Psychiatry: Normal mood and affect    LABS:                        13.2   7.30  )-----------( 135      ( 19 Apr 2024 02:47 )             41.0     04-20    143  |  106  |  37.2<H>  ----------------------------<  87  4.0   |  23.0  |  2.74<H>    Ca    7.7<L>      20 Apr 2024 07:28  Mg     2.2     04-20    TPro  6.6  /  Alb  2.9<L>  /  TBili  0.9  /  DBili  x   /  AST  8   /  ALT  8   /  AlkPhos  101  04-19    RADIOLOGY & ADDITIONAL STUDIES:  Reviewed     TTE W or WO Ultrasound Enhancing Agent (04.19.24 @ 15:50)   1. Left ventricular systolic function is moderately to severely decreased with an ejection fraction of 36 % by Alcala's method of disks with an ejection fraction visually estimated at 30 to 35 %.   2. Left ventricular cavity is mildly dilated. Septal motion is abnormal consistent with previous cardiac surgery.   3. Entire apex is abnormal.   4. Mild left ventricular hypertrophy.   5. There is severe (grade 3) left ventricular diastolic dysfunction.   6. Based on visual assessment, the right ventricle appears mildly enlarged. Mildly enlarged right ventricular cavity size and moderately to severely reduced systolic function.   7. The left atrium is severely dilated.   8. The right atrium is moderately dilated.   9. The interatrial septum appears intact.  10. Device lead is visualized in the right heart.  11. Symmetric mitral valve leaflet tethering.  12. Mild mitral regurgitation.  13. A TAVR (TAVR) valve replacement is present in the aortic position The prosthetic valve is well seatedwith normal function. No intravalvular or paravalvular regurgitation. Acceptable gradients.  14. Dilated pulmonary artery.  15. Estimated pulmonary artery systolic pressure is 39 mmHg.  16. No pericardial effusion seen.    MEDICATIONS  (STANDING):  allopurinol 50 milliGRAM(s) Oral daily  aMIOdarone    Tablet 200 milliGRAM(s) Oral daily  apixaban 5 milliGRAM(s) Oral every 12 hours  ascorbic acid 500 milliGRAM(s) Oral daily  ceFAZolin  Injectable. 1000 milliGRAM(s) IV Push every 8 hours  ferrous    sulfate 325 milliGRAM(s) Oral daily  folic acid 1 milliGRAM(s) Oral daily  furosemide   Injectable 40 milliGRAM(s) IV Push daily  metoprolol succinate ER 50 milliGRAM(s) Oral two times a day  silver sulfADIAZINE 1% Cream 1 Application(s) Topical daily  simvastatin 40 milliGRAM(s) Oral at bedtime  sodium bicarbonate 650 milliGRAM(s) Oral two times a day  tamsulosin 0.4 milliGRAM(s) Oral at bedtime    MEDICATIONS  (PRN):  acetaminophen     Tablet .. 650 milliGRAM(s) Oral every 6 hours PRN Temp greater or equal to 38C (100.4F), Mild Pain (1 - 3)  aluminum hydroxide/magnesium hydroxide/simethicone Suspension 30 milliLiter(s) Oral every 4 hours PRN Dyspepsia  melatonin 3 milliGRAM(s) Oral at bedtime PRN Insomnia  ondansetron Injectable 4 milliGRAM(s) IV Push every 8 hours PRN Nausea and/or Vomiting      
DEIRDRE MORRIS Patient is a 73y old  Male who presents with a chief complaint of    HPI:  Patient seen and examined before midnight.     72 y/o male with PMH of HTN, HLD, afib on Eliquis, CKD-3 came to the ED complaining of right knee pain and b/l weeping edema of LE. Patient said he tripped and fell 5 days ago, fell on his knees and hit his fore head. He did not seek medical intervention at the time. Said he has been having difficulty walking since then due to pain on his knee R>>L , also noted worsening weeping from his legs. He has no HA, dizziness, blurry vision, nausea, vomiting, chest pain, shortness of breath, palpitation, abdominal pain, change in bowel/urinary habit, fever, chills.      (17 Apr 2024 23:32)    The patient was seen and evaluated states has been having bad knee pain and weeping from both his legs   The patient is in no acute distress.      I&O's Summary    Allergies    No Known Allergies    Intolerances      HEALTH ISSUES - PROBLEM Dx:        PAST MEDICAL & SURGICAL HISTORY:  Hypertension      CKD (chronic kidney disease)      Afib      HLD (hyperlipidemia)      Presence of stent of CABG              Vital Signs Last 24 Hrs  T(C): 36.6 (18 Apr 2024 15:22), Max: 36.6 (18 Apr 2024 11:16)  T(F): 97.8 (18 Apr 2024 15:22), Max: 97.9 (18 Apr 2024 11:16)  HR: 56 (18 Apr 2024 15:22) (56 - 68)  BP: 125/79 (18 Apr 2024 15:22) (125/79 - 174/97)  BP(mean): --  RR: 18 (18 Apr 2024 15:22) (18 - 18)  SpO2: 96% (18 Apr 2024 15:22) (93% - 99%)    Parameters below as of 18 Apr 2024 15:22  Patient On (Oxygen Delivery Method): room air    T(C): 36.6 (04-18-24 @ 15:22), Max: 36.6 (04-18-24 @ 11:16)  HR: 56 (04-18-24 @ 15:22) (56 - 68)  BP: 125/79 (04-18-24 @ 15:22) (125/79 - 174/97)  RR: 18 (04-18-24 @ 15:22) (18 - 18)  SpO2: 96% (04-18-24 @ 15:22) (93% - 99%)  Wt(kg): --    PHYSICAL EXAM:    GENERAL: NAD conversing pleasant - has gray walking with support to the bathroom in ED with difficulty though   HEAD:  Atraumatic, Normocephalic  EYES: EOMI, PERRL, conjunctiva and sclera clear  ENMT:  Moist mucous membranes,    NERVOUS SYSTEM:  Alert & Oriented X3,  Moves upper and lower extremities; CNS-II-XII  CHEST/LUNG: Clear to auscultation bilaterally;  HEART: Regular rate and rhythm; No murmurs,   ABDOMEN: Soft, Nontender, Nondistended;   EXTREMITIES:  both discolored and red blue and weeping both legs   SKIN: chronic venous stats changes and redness and tenderness of both legs   psychiatry- mood and affect appropriate,     acetaminophen     Tablet .. 650 milliGRAM(s) Oral every 6 hours PRN  allopurinol 50 milliGRAM(s) Oral daily  aluminum hydroxide/magnesium hydroxide/simethicone Suspension 30 milliLiter(s) Oral every 4 hours PRN  aMIOdarone    Tablet 200 milliGRAM(s) Oral daily  apixaban 5 milliGRAM(s) Oral every 12 hours  ascorbic acid 500 milliGRAM(s) Oral daily  ceFAZolin  Injectable. 1000 milliGRAM(s) IV Push every 8 hours  ferrous    sulfate 325 milliGRAM(s) Oral daily  folic acid 1 milliGRAM(s) Oral daily  furosemide    Tablet 40 milliGRAM(s) Oral daily  melatonin 3 milliGRAM(s) Oral at bedtime PRN  metoprolol succinate ER 50 milliGRAM(s) Oral two times a day  ondansetron Injectable 4 milliGRAM(s) IV Push every 8 hours PRN  simvastatin 40 milliGRAM(s) Oral at bedtime  sodium bicarbonate 650 milliGRAM(s) Oral two times a day  tamsulosin 0.4 milliGRAM(s) Oral at bedtime      LABS:                          12.8   9.19  )-----------( 138      ( 18 Apr 2024 03:24 )             40.7     04-18    142  |  109<H>  |  34.9<H>  ----------------------------<  113<H>  4.2   |  21.0<L>  |  2.31<H>    Ca    8.6      18 Apr 2024 03:24  Phos  3.3     04-17  Mg     2.5     04-17    TPro  7.4  /  Alb  3.3  /  TBili  1.5  /  DBili  x   /  AST  15  /  ALT  12  /  AlkPhos  113  04-17    LIVER FUNCTIONS - ( 17 Apr 2024 13:29 )  Alb: 3.3 g/dL / Pro: 7.4 g/dL / ALK PHOS: 113 U/L / ALT: 12 U/L / AST: 15 U/L / GGT: x                 Urinalysis Basic - ( 18 Apr 2024 03:24 )    Color: x / Appearance: x / SG: x / pH: x  Gluc: 113 mg/dL / Ketone: x  / Bili: x / Urobili: x   Blood: x / Protein: x / Nitrite: x   Leuk Esterase: x / RBC: x / WBC x   Sq Epi: x / Non Sq Epi: x / Bacteria: x      CAPILLARY BLOOD GLUCOSE          RADIOLOGY & ADDITIONAL TESTS:      Consultant notes reviewed    Case discussed with consultant/provider/ family /patient 
Cellulitis of extremity    HPI:  Patient seen and examined before midnight.     74 y/o male with PMH of HTN, HLD, afib on Eliquis, CKD-3 came to the ED complaining of right knee pain and b/l weeping edema of LE. Patient said he tripped and fell 5 days ago, fell on his knees and hit his fore head. He did not seek medical intervention at the time. Said he has been having difficulty walking since then due to pain on his knee R>>L , also noted worsening weeping from his legs. He has no HA, dizziness, blurry vision, nausea, vomiting, chest pain, shortness of breath, palpitation, abdominal pain, change in bowel/urinary habit, fever, chills. (17 Apr 2024 23:32)    Interval History:  Patient was seen and examined at bedside around 10:45 am.  Feels OK.   Still has a lot of swelling in legs. Also complaining of exertional dyspnea.   Denies chest pain or palpitations.     ROS:  As per interval history otherwise unremarkable.    PHYSICAL EXAM:  Vital Signs   T(C): 36.6 (19 Apr 2024 15:15), Max: 36.8 (19 Apr 2024 00:07)  T(F): 97.8 (19 Apr 2024 15:15), Max: 98.2 (19 Apr 2024 00:07)  HR: 75 (19 Apr 2024 15:15) (51 - 101)  BP: 130/69 (19 Apr 2024 15:15) (130/69 - 167/76)  RR: 18 (19 Apr 2024 15:15) (18 - 18)  SpO2: 99% (19 Apr 2024 15:15) (92% - 100%)  Parameters below as of 19 Apr 2024 11:15  Patient On (Oxygen Delivery Method): room air  General: Elderly male lying in bed comfortably. No acute distress  HEENT: EOMI. Clear conjunctivae. Moist mucus membrane  Neck: Supple.   Chest: Good air entry. No wheezing, rales or rhonchi.   Heart: Normal S1 & S2. RRR.   Abdomen: Non distended. Soft. Non-tender. + BS  Ext: 4+ pedal edema. No calf tenderness. Superficial ulcers. Chronic skin changes.   Neuro: Awake and alert. No focal deficit. Speech clear.   Skin: Warm and Dry  Psychiatry: Normal mood and affect    LABS:                      13.2   7.30  )-----------( 135      ( 19 Apr 2024 02:47 )             41.0     04-19    143  |  110<H>  |  33.3<H>  ----------------------------<  92  4.4   |  20.0<L>  |  2.31<H>    Ca    8.2<L>      19 Apr 2024 02:47    TPro  6.6  /  Alb  2.9<L>  /  TBili  0.9  /  DBili  x   /  AST  8   /  ALT  8   /  AlkPhos  101  04-19    Urinalysis Basic - ( 19 Apr 2024 02:47 )    Color: x / Appearance: x / SG: x / pH: x  Gluc: 92 mg/dL / Ketone: x  / Bili: x / Urobili: x   Blood: x / Protein: x / Nitrite: x   Leuk Esterase: x / RBC: x / WBC x   Sq Epi: x / Non Sq Epi: x / Bacteria: x    RADIOLOGY & ADDITIONAL STUDIES:  Reviewed     MEDICATIONS  (STANDING):  allopurinol 50 milliGRAM(s) Oral daily  aMIOdarone    Tablet 200 milliGRAM(s) Oral daily  apixaban 5 milliGRAM(s) Oral every 12 hours  ascorbic acid 500 milliGRAM(s) Oral daily  ceFAZolin  Injectable. 1000 milliGRAM(s) IV Push every 8 hours  ferrous    sulfate 325 milliGRAM(s) Oral daily  folic acid 1 milliGRAM(s) Oral daily  furosemide   Injectable 40 milliGRAM(s) IV Push daily  metoprolol succinate ER 50 milliGRAM(s) Oral two times a day  simvastatin 40 milliGRAM(s) Oral at bedtime  sodium bicarbonate 650 milliGRAM(s) Oral two times a day  tamsulosin 0.4 milliGRAM(s) Oral at bedtime    MEDICATIONS  (PRN):  acetaminophen     Tablet .. 650 milliGRAM(s) Oral every 6 hours PRN Temp greater or equal to 38C (100.4F), Mild Pain (1 - 3)  aluminum hydroxide/magnesium hydroxide/simethicone Suspension 30 milliLiter(s) Oral every 4 hours PRN Dyspepsia  melatonin 3 milliGRAM(s) Oral at bedtime PRN Insomnia  ondansetron Injectable 4 milliGRAM(s) IV Push every 8 hours PRN Nausea and/or Vomiting      
Cellulitis of extremity    HPI:  Patient seen and examined before midnight.     74 y/o male with PMH of HTN, HLD, afib on Eliquis, CKD-3 came to the ED complaining of right knee pain and b/l weeping edema of LE. Patient said he tripped and fell 5 days ago, fell on his knees and hit his fore head. He did not seek medical intervention at the time. Said he has been having difficulty walking since then due to pain on his knee R>>L , also noted worsening weeping from his legs. He has no HA, dizziness, blurry vision, nausea, vomiting, chest pain, shortness of breath, palpitation, abdominal pain, change in bowel/urinary habit, fever, chills. (17 Apr 2024 23:32)    Interval History:  Patient was seen and examined at bedside around 9:15 am.  Feels much better.   Leg swelling is improving.   Denies chest pain or palpitations.  No SOB at rest.      ROS:  As per interval history otherwise unremarkable.    PHYSICAL EXAM:  Vital Signs   T(C): 36.4 (21 Apr 2024 08:46), Max: 36.9 (20 Apr 2024 18:40)  T(F): 97.6 (21 Apr 2024 08:46), Max: 98.4 (20 Apr 2024 18:40)  HR: 49 (21 Apr 2024 08:46) (49 - 84)  BP: 113/63 (21 Apr 2024 08:46) (113/63 - 146/74)  BP(mean): 98 (20 Apr 2024 18:40) (98 - 98)  RR: 18 (21 Apr 2024 08:46) (17 - 18)  SpO2: 94% (21 Apr 2024 08:46) (91% - 96%)  Parameters below as of 21 Apr 2024 08:46  Patient On (Oxygen Delivery Method): room air  General: Elderly male lying in bed comfortably. No acute distress  HEENT: EOMI. Clear conjunctivae. Moist mucus membrane  Neck: Supple.   Chest: Good air entry. No wheezing, rales or rhonchi.   Heart: Normal S1 & S2. RRR.   Abdomen: Non distended. Soft. Non-tender. + BS  Ext: 2+ pedal edema. No calf tenderness. Superficial ulcers. Chronic skin changes.   Neuro: Awake and alert. No focal deficit. Speech clear.   Skin: Warm and Dry  Psychiatry: Normal mood and affect    LABS:    04-21    143  |  107  |  43.9<H>  ----------------------------<  77  3.9   |  23.0  |  2.45<H>    Ca    7.9<L>      21 Apr 2024 04:40  Mg     2.2     04-21    RADIOLOGY & ADDITIONAL STUDIES:  Reviewed     TTE W or WO Ultrasound Enhancing Agent (04.19.24 @ 15:50)   1. Left ventricular systolic function is moderately to severely decreased with an ejection fraction of 36 % by Alcala's method of disks with an ejection fraction visually estimated at 30 to 35 %.   2. Left ventricular cavity is mildly dilated. Septal motion is abnormal consistent with previous cardiac surgery.   3. Entire apex is abnormal.   4. Mild left ventricular hypertrophy.   5. There is severe (grade 3) left ventricular diastolic dysfunction.   6. Based on visual assessment, the right ventricle appears mildly enlarged. Mildly enlarged right ventricular cavity size and moderately to severely reduced systolic function.   7. The left atrium is severely dilated.   8. The right atrium is moderately dilated.   9. The interatrial septum appears intact.  10. Device lead is visualized in the right heart.  11. Symmetric mitral valve leaflet tethering.  12. Mild mitral regurgitation.  13. A TAVR (TAVR) valve replacement is present in the aortic position The prosthetic valve is well seatedwith normal function. No intravalvular or paravalvular regurgitation. Acceptable gradients.  14. Dilated pulmonary artery.  15. Estimated pulmonary artery systolic pressure is 39 mmHg.  16. No pericardial effusion seen.    MEDICATIONS  (STANDING):  allopurinol 50 milliGRAM(s) Oral daily  aMIOdarone    Tablet 200 milliGRAM(s) Oral daily  apixaban 5 milliGRAM(s) Oral every 12 hours  ascorbic acid 500 milliGRAM(s) Oral daily  ceFAZolin  Injectable. 1000 milliGRAM(s) IV Push every 8 hours  ferrous    sulfate 325 milliGRAM(s) Oral daily  folic acid 1 milliGRAM(s) Oral daily  furosemide   Injectable 40 milliGRAM(s) IV Push daily  metoprolol tartrate 25 milliGRAM(s) Oral two times a day  potassium chloride    Tablet ER 20 milliEquivalent(s) Oral once  silver sulfADIAZINE 1% Cream 1 Application(s) Topical daily  simvastatin 40 milliGRAM(s) Oral at bedtime  sodium bicarbonate 650 milliGRAM(s) Oral two times a day  tamsulosin 0.4 milliGRAM(s) Oral at bedtime    MEDICATIONS  (PRN):  acetaminophen     Tablet .. 650 milliGRAM(s) Oral every 6 hours PRN Temp greater or equal to 38C (100.4F), Mild Pain (1 - 3)  aluminum hydroxide/magnesium hydroxide/simethicone Suspension 30 milliLiter(s) Oral every 4 hours PRN Dyspepsia  melatonin 3 milliGRAM(s) Oral at bedtime PRN Insomnia  ondansetron Injectable 4 milliGRAM(s) IV Push every 8 hours PRN Nausea and/or Vomiting

## 2024-04-22 NOTE — DISCHARGE NOTE NURSING/CASE MANAGEMENT/SOCIAL WORK - NSDCPEFALRISK_GEN_ALL_CORE
For information on Fall & Injury Prevention, visit: [email or web address] OR  [email or web address] OR  https://www.cdc.gov/steadi/patient.html

## 2024-04-22 NOTE — DISCHARGE NOTE PROVIDER - CARE PROVIDER_API CALL
Colleen Garcia Sol  Cardiology  [address] [place] [id number]  Phone: [phone]  Fax: [phone]  Established Patient  Follow Up Time: 1 week    Anirudh Asencio  [facility] Medicine  [address] [place] [id number]  Phone: [phone]  Fax: [phone]  Established Patient  Follow Up Time: 1 week    ENT,   Phone: (   )    -  Fax: (   )    -  Follow Up Time: 2 weeks    Nephrology,   Phone: (   )    -  Fax: (   )    -  Established Patient  Follow Up Time: 1 week

## 2024-04-22 NOTE — DISCHARGE NOTE PROVIDER - PROVIDER TOKENS
PROVIDER:[TOKEN:[[id number]],FOLLOWUP:[1 week],ESTABLISHEDPATIENT:[T]],PROVIDER:[TOKEN:[[id number]],FOLLOWUP:[1 week],ESTABLISHEDPATIENT:[T]],FREE:[LAST:[ENT],PHONE:[(   )    -],FAX:[(   )    -],FOLLOWUP:[2 weeks]],FREE:[LAST:[Nephrology],PHONE:[(   )    -],FAX:[(   )    -],FOLLOWUP:[1 week],ESTABLISHEDPATIENT:[T]]

## 2024-04-22 NOTE — DISCHARGE NOTE PROVIDER - HOSPITAL COURSE
72 y/o male with PMH of HTN, HLD, CHF, afib on Eliquis, CKD-3 came to the ED complaining of right knee pain and b/l weeping edema of LE. Patient said he tripped and fell 5 days ago, fell on his knees and hit his fore head. No obvious injuries except bruising on forehead. Admitted with B/L LE Cellulitis and was started on Kefzol. Found to have Acute on Chronic Biventricular Failure (acute on chronic combined systolic / diastolic heart failure and acute on chronic right heart failure), started on IV Lasix and was seen by Cardio. He was monitored closely. Symptoms are improving. 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 He is feeling much better and is stable for discharge. PT recommending UMER and patient is agreeable.

## 2024-04-22 NOTE — DISCHARGE NOTE PROVIDER - NSDCFUADDAPPT_GEN_ALL_CORE_FT
US head/neck soft tissue with hypoechoic lesion in the left parotid gland for which differential consideration includes a pleomorphic adenoma.    Please follow up with ENT.

## 2024-04-22 NOTE — DISCHARGE NOTE PROVIDER - NSDCFUSCHEDAPPT_GEN_ALL_CORE_FT
Yael, Dewey POZO  [facility] Physician [facility]  ORTHOSURG [address]  Scheduled Appointment: 05/06/2024

## 2024-05-06 ENCOUNTER — APPOINTMENT (OUTPATIENT)
Dept: ORTHOPEDIC SURGERY | Facility: CLINIC | Age: 74
End: 2024-05-06
Payer: MEDICARE

## 2024-05-06 VITALS
SYSTOLIC BLOOD PRESSURE: 150 MMHG | WEIGHT: 255 LBS | HEIGHT: 68 IN | BODY MASS INDEX: 38.65 KG/M2 | HEART RATE: 53 BPM | DIASTOLIC BLOOD PRESSURE: 75 MMHG

## 2024-05-06 DIAGNOSIS — R60.0 LOCALIZED EDEMA: ICD-10-CM

## 2024-05-06 DIAGNOSIS — I99.8 OTHER DISORDER OF CIRCULATORY SYSTEM: ICD-10-CM

## 2024-05-06 DIAGNOSIS — Z79.01 LONG TERM (CURRENT) USE OF ANTICOAGULANTS: ICD-10-CM

## 2024-05-06 DIAGNOSIS — M17.11 UNILATERAL PRIMARY OSTEOARTHRITIS, RIGHT KNEE: ICD-10-CM

## 2024-05-06 PROCEDURE — 73562 X-RAY EXAM OF KNEE 3: CPT | Mod: TC

## 2024-05-06 PROCEDURE — 99205 OFFICE O/P NEW HI 60 MIN: CPT | Mod: 25

## 2024-05-06 PROCEDURE — 20610 DRAIN/INJ JOINT/BURSA W/O US: CPT | Mod: RT

## 2024-05-06 NOTE — PHYSICAL EXAM
[Antalgic] : antalgic [Cane] : ambulates with cane [LE] : Sensory: Intact in bilateral lower extremities [ALL] : dorsalis pedis, posterior tibial, femoral, popliteal, and radial 2+ and symmetric bilaterally [de-identified] : GENERAL APPEARANCE: Well nourished and hydrated, pleasant, alert, and oriented x 3. Appears their stated age.  HEENT: Normocephalic, extraocular eye motion intact. Nasal septum midline. Oral cavity clear. External auditory canal clear.  RESPIRATORY: Breath sounds clear and audible in all lobes. No wheezing, No accessory muscle use. CARDIOVASCULAR: No apparent abnormalities. No lower leg edema. No varicosities. Pedal pulses are palpable. NEUROLOGIC: Sensation is normal, no muscle weakness in the upper or lower extremities. DERMATOLOGIC: No apparent skin lesions, moist, warm, no rash. SPINE: Cervical spine appears normal and moves freely; thoracic spine appears normal and moves freely; lumbosacral spine appears normal and moves freely, normal, nontender. MUSCULOSKELETAL: Hands, wrists, and elbows are normal and move freely, shoulders are normal and move freely.  Musculoskeletal 5/5 motor strength in bilateral lower extremities. Sensory: Intact in bilateral lower extremities. DTRs: Biceps, brachioradialis, triceps, patellar, ankle and plantar 2+ and symmetric bilaterally. Pulses: dorsalis pedis, posterior tibial, femoral, popliteal, and radial 2+ and symmetric bilaterally.  Constitutional: Alert and in no acute distress, but well-appearing.   [de-identified] : Right knee examination shows severe varus deformity he has subluxation and instability with weightbearing he has 5 degree extension and 90 degree flexion.  He has right lower extremity weeping edema, evidence of cellulitis of b/l LE [de-identified] : 4V xray of the right knee done in the office today and reviewed by Dr. Dewey Conley demonstrates  bone on bone medial compartmental osteoarthritis with medial tibial bone loss of the right knee.

## 2024-05-06 NOTE — HISTORY OF PRESENT ILLNESS
[Pain Location] : pain [Worsening] : worsening [___ yrs] : [unfilled] year(s) ago [6] : a current pain level of 6/10 [Walking] : worsened by walking [None] : No relieving factors are noted [4] : a minimum pain level of 4/10 [8] : a maximum pain level of 8/10 [de-identified] : This is a 73-year-old obese male presented to our office from rehab facility he fell and had severe right knee pain. He went to [facility] and was placed on rehab facility for severely affected mobility. He is in a wheelchair today. He does transfer and walk very short distance with a cane patient has been having knee pain in the past 10 to 15 years. His pain was moderate and tolerable patient is currently having instability of the right leg when he stands and walk the he states " the knee throws him out" Patient is on Eliquis for A-fib.  He has tried it home exercise program met all over-the-counter medication for pain. The pt has a hx of infection of both legs.  [Acetaminophen] : not relieved by acetaminophen [Rest] : not relieved with rest

## 2024-05-06 NOTE — DISCUSSION/SUMMARY
[Medication Risks Reviewed] : Medication risks reviewed [Surgical risks reviewed] : Surgical risks reviewed [de-identified] : 72 y/o M pt presents with bone-on-bone medial compartmental osteoarthritis with medial tibial bone loss of the right knee. The nature of his condition and treatment options were discussed in detail. The pt is a candidate for a right TKA. The pt understands to discontinue Eliquis 72 hours prior to surgery. He does transfer and walk very short distance with a cane patient has been having knee pain in the past 10 to 15 years. The pt is having worsening pain with walking, stairs, and exercise.  The pt has weeping edema which does raise his risk of infection. He does have some evidence of prior cellulitis as well. Before considering surgery, I recommend consultation with vascular doctor. I discussed conservative treatment such as cortisone injections, HA injections, PT, anti-inflammatories, and low impact exercise. He should continue to do low impact exercises. I provided the pt with a right knee cortisone injection which he tolerated well. The pt will f/u in 3 months for re-evaluation.     Unfortunately the patient is very high risk for total knee replacement.  We are going to try to continue with conservative treatment if possible.  I will reevaluate him after the vascular evaluation  The patient has been counseled regarding the elevated risks associated with surgical complications in patients with a BMI> 35. The patient demonstrates an understanding of the increased risk. After a lengthy discussion, the patient agreed to make a coordinated effort at weight loss prior to surgical intervention. The patient understands our BMI policy and they will make a conscious effort to improve their BMI.		   The patient is a 73 year year old individual with end stage arthritis of their right knee joint. The patient has exhausted a minimum of 3 months conservative treatment including prior injections (cortisone and/or hyaluronic acid injections), physical therapy, over the counter NSAIDS and pain medication where indicated.  In addition, the patient's quality of life is diminished due to significant chronic pain. The patient is having difficulty with activities of daily living, including ambulating, descending stairs, and rising from a seated position. Based upon the patients continued symptoms and failure to respond to conservative treatment, I have recommended a right total knee replacement for this patient. A long discussion took place with the patient describing what a total joint replacement is and what the procedure would entail. A knee model, similar to the implant that will be used during the operation, was utilized to demonstrate and to discuss the various bearing surfaces of the implants. Implant fixation, use of cement, was also discussed with the patient. Choices of implant manufacturers, mainly [employer] and [employer], were discussed and reviewed with preference to be made by patient and surgeon prior to operation. Final selection to be based on customary practice as well as preoperative templating with selection confirmed intraoperatively based on the patient's anatomy. The patient participated and agreed with the decision-making process. The hospitalization and post-operative care and rehabilitation were also discussed. The use of perioperative antibiotics and DVT prophylaxis were discussed. The risk, benefits and alternatives to a surgical intervention were discussed at length with the patient. The patient was also advised of risks related to the medical comorbidities and elevated body mass index (BMI). A lengthy discussion took place to review the most common complications including but not limited to: deep vein thrombosis, pulmonary embolism, heart attack, stroke, infection, wound breakdown, numbness, damage to nerves, tendon, muscles, arteries or other blood vessels, death and other possible complications from anesthesia. The patient was told that we will take steps to minimize these risks by using sterile technique, antibiotics and DVT prophylaxis when appropriate and follow the patient postoperatively in the office setting to monitor progress. The possibility of recurrent pain, no improvement in pain and actual worsening of pain were also discussed with the patient.    The discharge plan of care focused on the patient going home following surgery. The patient was encouraged to make the necessary arrangements to have someone stay with them when they are discharged home. Following discharge, a home care nurse will visit the patient. The home care nurse will open your home care case and request home physical therapy services. Home physical therapy will commence following discharge provided it is appropriate and covered by the health insurance benefit plan.   The benefits of surgery were discussed with the patient including the potential for improving his/her current clinical condition through operative intervention. Alternatives to surgical intervention including continued conservative management were also discussed in detail. All questions were answered to the satisfaction of the patient. The treatment plan of care, as well as a model of a total knee equivalent to the one that will be used for their total joint replacement, was shared with the patient. The patient participated and agreed to the plan of care as well as the use of the recommended implants for their total joint replacement surgery.         Attending Attestation (For Attendings USE Only)...

## 2024-05-06 NOTE — PROCEDURE
[de-identified] : I injected the patient's right knee today with cortisone for primary osteoarthritis.  I discussed at length with the patient the planned steroid and lidocaine injection. The risks, benefits, convalescence and alternatives were reviewed. The possible side effects discussed included but were not limited to: pain, swelling, heat, bleeding, and redness. Symptoms are generally mild but if they are extensive then contact the office. Giving pain relievers by mouth such as NSAIDs or Tylenol can generally treat the reactions to steroid and lidocaine. Rare cases of infection have been noted. Rash, hives and itching may occur post injection. If you have muscle pain or cramps, flushing and or swelling of the face, rapid heart beat, nausea, dizziness, fever, chills, headache, difficulty breathing, swelling in the arms or legs, or have a prickly feeling of your skin, contact a health care provider immediately. Following this discussion, the knee was prepped with Alcohol and under sterile condition the 80 mg Depo-Medrol and 6 cc Lidocaine injection was performed with a 20 gauge needle through a superolateral injection site. The needle was introduced into the joint, aspiration was performed to ensure intra-articular placement and the medication was injected. Upon withdrawal of the needle the site was cleaned with alcohol and a band aid applied. The patient tolerated the injection well and there were no adverse effects. Post injection instructions included no strenuous activity for 24 hours, cryotherapy and if there are any adverse effects to contact the office.

## 2024-05-06 NOTE — END OF VISIT
[FreeTextEntry3] : I, Mohsen Osei, acted solely as a scribe for Dr. Dewey Conley on 05/06/2024.  I personally performed the services described in the documentation, reviewed the documentation recorded by the scribe in my presence, and it accurately and completely records my words and actions.

## 2024-06-13 ENCOUNTER — OFFICE (OUTPATIENT)
Dept: URBAN - METROPOLITAN AREA CLINIC 112 | Facility: CLINIC | Age: 74
Setting detail: OPHTHALMOLOGY
End: 2024-06-13
Payer: MEDICARE

## 2024-06-13 DIAGNOSIS — H35.033: ICD-10-CM

## 2024-06-13 PROCEDURE — 92014 COMPRE OPH EXAM EST PT 1/>: CPT | Performed by: PHYSICIAN ASSISTANT

## 2024-06-13 ASSESSMENT — CONFRONTATIONAL VISUAL FIELD TEST (CVF)
OD_FINDINGS: FULL
OS_FINDINGS: FULL

## 2025-03-26 ENCOUNTER — RESULT REVIEW (OUTPATIENT)
Age: 75
End: 2025-03-26

## 2025-03-26 ENCOUNTER — INPATIENT (INPATIENT)
Facility: HOSPITAL | Age: 75
LOS: 8 days | Discharge: HOME | DRG: 291 | End: 2025-04-04
Attending: STUDENT IN AN ORGANIZED HEALTH CARE EDUCATION/TRAINING PROGRAM | Admitting: STUDENT IN AN ORGANIZED HEALTH CARE EDUCATION/TRAINING PROGRAM
Payer: MEDICARE

## 2025-03-26 VITALS
DIASTOLIC BLOOD PRESSURE: 92 MMHG | WEIGHT: 199.96 LBS | HEART RATE: 61 BPM | RESPIRATION RATE: 18 BRPM | OXYGEN SATURATION: 99 % | SYSTOLIC BLOOD PRESSURE: 189 MMHG | TEMPERATURE: 98 F

## 2025-03-26 DIAGNOSIS — Z95.5 PRESENCE OF CORONARY ANGIOPLASTY IMPLANT AND GRAFT: Chronic | ICD-10-CM

## 2025-03-26 DIAGNOSIS — R04.0 EPISTAXIS: ICD-10-CM

## 2025-03-26 PROBLEM — I48.91 UNSPECIFIED ATRIAL FIBRILLATION: Chronic | Status: ACTIVE | Noted: 2024-04-17

## 2025-03-26 PROBLEM — E78.5 HYPERLIPIDEMIA, UNSPECIFIED: Chronic | Status: ACTIVE | Noted: 2024-04-17

## 2025-03-26 PROBLEM — N18.9 CHRONIC KIDNEY DISEASE, UNSPECIFIED: Chronic | Status: ACTIVE | Noted: 2024-04-17

## 2025-03-26 PROBLEM — I10 ESSENTIAL (PRIMARY) HYPERTENSION: Chronic | Status: ACTIVE | Noted: 2024-04-17

## 2025-03-26 LAB
ALBUMIN SERPL ELPH-MCNC: 3.3 G/DL — SIGNIFICANT CHANGE UP (ref 3.3–5.2)
ALP SERPL-CCNC: 104 U/L — SIGNIFICANT CHANGE UP (ref 40–120)
ALT FLD-CCNC: 11 U/L — SIGNIFICANT CHANGE UP
ANION GAP SERPL CALC-SCNC: 14 MMOL/L — SIGNIFICANT CHANGE UP (ref 5–17)
ANION GAP SERPL CALC-SCNC: 14 MMOL/L — SIGNIFICANT CHANGE UP (ref 5–17)
ANION GAP SERPL CALC-SCNC: 16 MMOL/L — SIGNIFICANT CHANGE UP (ref 5–17)
APTT BLD: 33.6 SEC — SIGNIFICANT CHANGE UP (ref 24.5–35.6)
AST SERPL-CCNC: 14 U/L — SIGNIFICANT CHANGE UP
BASOPHILS # BLD AUTO: 0.03 K/UL — SIGNIFICANT CHANGE UP (ref 0–0.2)
BASOPHILS # BLD AUTO: 0.03 K/UL — SIGNIFICANT CHANGE UP (ref 0–0.2)
BASOPHILS NFR BLD AUTO: 0.4 % — SIGNIFICANT CHANGE UP (ref 0–2)
BASOPHILS NFR BLD AUTO: 0.4 % — SIGNIFICANT CHANGE UP (ref 0–2)
BILIRUB SERPL-MCNC: 0.6 MG/DL — SIGNIFICANT CHANGE UP (ref 0.4–2)
BLD GP AB SCN SERPL QL: SIGNIFICANT CHANGE UP
BUN SERPL-MCNC: 50.1 MG/DL — HIGH (ref 8–20)
BUN SERPL-MCNC: 55.2 MG/DL — HIGH (ref 8–20)
BUN SERPL-MCNC: 62.4 MG/DL — HIGH (ref 8–20)
CALCIUM SERPL-MCNC: 6.9 MG/DL — LOW (ref 8.4–10.5)
CALCIUM SERPL-MCNC: 7.6 MG/DL — LOW (ref 8.4–10.5)
CALCIUM SERPL-MCNC: 7.8 MG/DL — LOW (ref 8.4–10.5)
CHLORIDE SERPL-SCNC: 112 MMOL/L — HIGH (ref 96–108)
CHLORIDE SERPL-SCNC: 113 MMOL/L — HIGH (ref 96–108)
CHLORIDE SERPL-SCNC: 114 MMOL/L — HIGH (ref 96–108)
CO2 SERPL-SCNC: 18 MMOL/L — LOW (ref 22–29)
CO2 SERPL-SCNC: 20 MMOL/L — LOW (ref 22–29)
CO2 SERPL-SCNC: 21 MMOL/L — LOW (ref 22–29)
CREAT SERPL-MCNC: 3.31 MG/DL — HIGH (ref 0.5–1.3)
CREAT SERPL-MCNC: 3.44 MG/DL — HIGH (ref 0.5–1.3)
CREAT SERPL-MCNC: 3.55 MG/DL — HIGH (ref 0.5–1.3)
EGFR: 17 ML/MIN/1.73M2 — LOW
EGFR: 17 ML/MIN/1.73M2 — LOW
EGFR: 18 ML/MIN/1.73M2 — LOW
EGFR: 18 ML/MIN/1.73M2 — LOW
EGFR: 19 ML/MIN/1.73M2 — LOW
EGFR: 19 ML/MIN/1.73M2 — LOW
EOSINOPHIL # BLD AUTO: 0.01 K/UL — SIGNIFICANT CHANGE UP (ref 0–0.5)
EOSINOPHIL # BLD AUTO: 0.04 K/UL — SIGNIFICANT CHANGE UP (ref 0–0.5)
EOSINOPHIL NFR BLD AUTO: 0.1 % — SIGNIFICANT CHANGE UP (ref 0–6)
EOSINOPHIL NFR BLD AUTO: 0.6 % — SIGNIFICANT CHANGE UP (ref 0–6)
FLUAV AG NPH QL: SIGNIFICANT CHANGE UP
FLUBV AG NPH QL: SIGNIFICANT CHANGE UP
GLUCOSE SERPL-MCNC: 101 MG/DL — HIGH (ref 70–99)
GLUCOSE SERPL-MCNC: 110 MG/DL — HIGH (ref 70–99)
GLUCOSE SERPL-MCNC: 117 MG/DL — HIGH (ref 70–99)
HCT VFR BLD CALC: 26 % — LOW (ref 39–50)
HCT VFR BLD CALC: 27.1 % — LOW (ref 39–50)
HGB BLD-MCNC: 8.2 G/DL — LOW (ref 13–17)
HGB BLD-MCNC: 8.6 G/DL — LOW (ref 13–17)
IMM GRANULOCYTES # BLD AUTO: 0.02 K/UL — SIGNIFICANT CHANGE UP (ref 0–0.07)
IMM GRANULOCYTES # BLD AUTO: 0.02 K/UL — SIGNIFICANT CHANGE UP (ref 0–0.07)
IMM GRANULOCYTES NFR BLD AUTO: 0.3 % — SIGNIFICANT CHANGE UP (ref 0–0.9)
IMM GRANULOCYTES NFR BLD AUTO: 0.3 % — SIGNIFICANT CHANGE UP (ref 0–0.9)
INR BLD: 3.2 RATIO — HIGH (ref 0.85–1.16)
LYMPHOCYTES # BLD AUTO: 0.67 K/UL — LOW (ref 1–3.3)
LYMPHOCYTES # BLD AUTO: 0.78 K/UL — LOW (ref 1–3.3)
LYMPHOCYTES NFR BLD AUTO: 11 % — LOW (ref 13–44)
LYMPHOCYTES NFR BLD AUTO: 9.4 % — LOW (ref 13–44)
MAGNESIUM SERPL-MCNC: 2.3 MG/DL — SIGNIFICANT CHANGE UP (ref 1.6–2.6)
MCHC RBC-ENTMCNC: 31.5 G/DL — LOW (ref 32–36)
MCHC RBC-ENTMCNC: 31.7 G/DL — LOW (ref 32–36)
MCHC RBC-ENTMCNC: 33 PG — SIGNIFICANT CHANGE UP (ref 27–34)
MCHC RBC-ENTMCNC: 33.3 PG — SIGNIFICANT CHANGE UP (ref 27–34)
MCV RBC AUTO: 103.8 FL — HIGH (ref 80–100)
MCV RBC AUTO: 105.7 FL — HIGH (ref 80–100)
MONOCYTES # BLD AUTO: 0.38 K/UL — SIGNIFICANT CHANGE UP (ref 0–0.9)
MONOCYTES # BLD AUTO: 0.39 K/UL — SIGNIFICANT CHANGE UP (ref 0–0.9)
MONOCYTES NFR BLD AUTO: 5.3 % — SIGNIFICANT CHANGE UP (ref 2–14)
MONOCYTES NFR BLD AUTO: 5.5 % — SIGNIFICANT CHANGE UP (ref 2–14)
NEUTROPHILS # BLD AUTO: 5.9 K/UL — SIGNIFICANT CHANGE UP (ref 1.8–7.4)
NEUTROPHILS # BLD AUTO: 5.97 K/UL — SIGNIFICANT CHANGE UP (ref 1.8–7.4)
NEUTROPHILS NFR BLD AUTO: 82.9 % — HIGH (ref 43–77)
NEUTROPHILS NFR BLD AUTO: 83.8 % — HIGH (ref 43–77)
NRBC # BLD AUTO: 0 K/UL — SIGNIFICANT CHANGE UP (ref 0–0)
NRBC # BLD AUTO: 0 K/UL — SIGNIFICANT CHANGE UP (ref 0–0)
NRBC # FLD: 0 K/UL — SIGNIFICANT CHANGE UP (ref 0–0)
NRBC # FLD: 0 K/UL — SIGNIFICANT CHANGE UP (ref 0–0)
NRBC BLD AUTO-RTO: 0 /100 WBCS — SIGNIFICANT CHANGE UP (ref 0–0)
NRBC BLD AUTO-RTO: 0 /100 WBCS — SIGNIFICANT CHANGE UP (ref 0–0)
NT-PROBNP SERPL-SCNC: HIGH PG/ML (ref 0–300)
PLATELET # BLD AUTO: 136 K/UL — LOW (ref 150–400)
PLATELET # BLD AUTO: 139 K/UL — LOW (ref 150–400)
PMV BLD: 11 FL — SIGNIFICANT CHANGE UP (ref 7–13)
PMV BLD: 11.5 FL — SIGNIFICANT CHANGE UP (ref 7–13)
POTASSIUM SERPL-MCNC: 3.9 MMOL/L — SIGNIFICANT CHANGE UP (ref 3.5–5.3)
POTASSIUM SERPL-MCNC: 4.3 MMOL/L — SIGNIFICANT CHANGE UP (ref 3.5–5.3)
POTASSIUM SERPL-MCNC: 5.9 MMOL/L — HIGH (ref 3.5–5.3)
POTASSIUM SERPL-SCNC: 3.9 MMOL/L — SIGNIFICANT CHANGE UP (ref 3.5–5.3)
POTASSIUM SERPL-SCNC: 4.3 MMOL/L — SIGNIFICANT CHANGE UP (ref 3.5–5.3)
POTASSIUM SERPL-SCNC: 5.9 MMOL/L — HIGH (ref 3.5–5.3)
PROT SERPL-MCNC: 6.3 G/DL — LOW (ref 6.6–8.7)
PROTHROM AB SERPL-ACNC: 35.8 SEC — HIGH (ref 9.9–13.4)
RBC # BLD: 2.46 M/UL — LOW (ref 4.2–5.8)
RBC # BLD: 2.61 M/UL — LOW (ref 4.2–5.8)
RBC # FLD: 14.4 % — SIGNIFICANT CHANGE UP (ref 10.3–14.5)
RBC # FLD: 14.6 % — HIGH (ref 10.3–14.5)
RSV RNA NPH QL NAA+NON-PROBE: SIGNIFICANT CHANGE UP
SARS-COV-2 RNA SPEC QL NAA+PROBE: SIGNIFICANT CHANGE UP
SODIUM SERPL-SCNC: 146 MMOL/L — HIGH (ref 135–145)
SODIUM SERPL-SCNC: 147 MMOL/L — HIGH (ref 135–145)
SODIUM SERPL-SCNC: 148 MMOL/L — HIGH (ref 135–145)
SOURCE RESPIRATORY: SIGNIFICANT CHANGE UP
TROPONIN T, HIGH SENSITIVITY RESULT: 33 NG/L — SIGNIFICANT CHANGE UP (ref 0–51)
WBC # BLD: 7.12 K/UL — SIGNIFICANT CHANGE UP (ref 3.8–10.5)
WBC # BLD: 7.12 K/UL — SIGNIFICANT CHANGE UP (ref 3.8–10.5)
WBC # FLD AUTO: 7.12 K/UL — SIGNIFICANT CHANGE UP (ref 3.8–10.5)
WBC # FLD AUTO: 7.12 K/UL — SIGNIFICANT CHANGE UP (ref 3.8–10.5)

## 2025-03-26 PROCEDURE — 99285 EMERGENCY DEPT VISIT HI MDM: CPT | Mod: 25

## 2025-03-26 PROCEDURE — 93306 TTE W/DOPPLER COMPLETE: CPT | Mod: 26

## 2025-03-26 PROCEDURE — 99223 1ST HOSP IP/OBS HIGH 75: CPT

## 2025-03-26 PROCEDURE — 71045 X-RAY EXAM CHEST 1 VIEW: CPT | Mod: 26,XE

## 2025-03-26 PROCEDURE — 30903 CONTROL OF NOSEBLEED: CPT | Mod: RT

## 2025-03-26 PROCEDURE — 99283 EMERGENCY DEPT VISIT LOW MDM: CPT

## 2025-03-26 PROCEDURE — 71046 X-RAY EXAM CHEST 2 VIEWS: CPT | Mod: 26

## 2025-03-26 PROCEDURE — 93010 ELECTROCARDIOGRAM REPORT: CPT

## 2025-03-26 RX ORDER — AMIODARONE HYDROCHLORIDE 50 MG/ML
100 INJECTION, SOLUTION INTRAVENOUS DAILY
Refills: 0 | Status: DISCONTINUED | OUTPATIENT
Start: 2025-03-26 | End: 2025-04-04

## 2025-03-26 RX ORDER — TAMSULOSIN HYDROCHLORIDE 0.4 MG/1
0.4 CAPSULE ORAL AT BEDTIME
Refills: 0 | Status: DISCONTINUED | OUTPATIENT
Start: 2025-03-26 | End: 2025-04-04

## 2025-03-26 RX ORDER — MELATONIN 5 MG
3 TABLET ORAL AT BEDTIME
Refills: 0 | Status: DISCONTINUED | OUTPATIENT
Start: 2025-03-26 | End: 2025-04-04

## 2025-03-26 RX ORDER — FUROSEMIDE 10 MG/ML
40 INJECTION INTRAMUSCULAR; INTRAVENOUS EVERY 12 HOURS
Refills: 0 | Status: DISCONTINUED | OUTPATIENT
Start: 2025-03-27 | End: 2025-03-28

## 2025-03-26 RX ORDER — FUROSEMIDE 10 MG/ML
40 INJECTION INTRAMUSCULAR; INTRAVENOUS ONCE
Refills: 0 | Status: COMPLETED | OUTPATIENT
Start: 2025-03-26 | End: 2025-03-26

## 2025-03-26 RX ORDER — INFLUENZA A VIRUS A/IDAHO/07/2018 (H1N1) ANTIGEN (MDCK CELL DERIVED, PROPIOLACTONE INACTIVATED, INFLUENZA A VIRUS A/INDIANA/08/2018 (H3N2) ANTIGEN (MDCK CELL DERIVED, PROPIOLACTONE INACTIVATED), INFLUENZA B VIRUS B/SINGAPORE/INFTT-16-0610/2016 ANTIGEN (MDCK CELL DERIVED, PROPIOLACTONE INACTIVATED), INFLUENZA B VIRUS B/IOWA/06/2017 ANTIGEN (MDCK CELL DERIVED, PROPIOLACTONE INACTIVATED) 15; 15; 15; 15 UG/.5ML; UG/.5ML; UG/.5ML; UG/.5ML
0.5 INJECTION, SUSPENSION INTRAMUSCULAR ONCE
Refills: 0 | Status: DISCONTINUED | OUTPATIENT
Start: 2025-03-26 | End: 2025-04-04

## 2025-03-26 RX ORDER — TRANEXAMIC ACID 1000 MG/10
5 AMPUL (ML) INTRAVENOUS ONCE
Refills: 0 | Status: COMPLETED | OUTPATIENT
Start: 2025-03-26 | End: 2025-03-26

## 2025-03-26 RX ORDER — ATORVASTATIN CALCIUM 80 MG/1
20 TABLET, FILM COATED ORAL AT BEDTIME
Refills: 0 | Status: DISCONTINUED | OUTPATIENT
Start: 2025-03-26 | End: 2025-04-04

## 2025-03-26 RX ORDER — METOPROLOL SUCCINATE 50 MG/1
25 TABLET, EXTENDED RELEASE ORAL DAILY
Refills: 0 | Status: DISCONTINUED | OUTPATIENT
Start: 2025-03-26 | End: 2025-03-31

## 2025-03-26 RX ORDER — AMIODARONE HYDROCHLORIDE 50 MG/ML
1 INJECTION, SOLUTION INTRAVENOUS
Refills: 0 | DISCHARGE

## 2025-03-26 RX ORDER — FOLIC ACID 1 MG/1
1 TABLET ORAL DAILY
Refills: 0 | Status: DISCONTINUED | OUTPATIENT
Start: 2025-03-26 | End: 2025-04-04

## 2025-03-26 RX ORDER — ONDANSETRON HCL/PF 4 MG/2 ML
4 VIAL (ML) INJECTION EVERY 8 HOURS
Refills: 0 | Status: DISCONTINUED | OUTPATIENT
Start: 2025-03-26 | End: 2025-04-04

## 2025-03-26 RX ORDER — MAGNESIUM, ALUMINUM HYDROXIDE 200-200 MG
30 TABLET,CHEWABLE ORAL EVERY 4 HOURS
Refills: 0 | Status: DISCONTINUED | OUTPATIENT
Start: 2025-03-26 | End: 2025-04-04

## 2025-03-26 RX ORDER — CLOPIDOGREL BISULFATE 75 MG/1
75 TABLET, FILM COATED ORAL DAILY
Refills: 0 | Status: DISCONTINUED | OUTPATIENT
Start: 2025-03-26 | End: 2025-03-26

## 2025-03-26 RX ORDER — ACETAMINOPHEN 500 MG/5ML
650 LIQUID (ML) ORAL EVERY 6 HOURS
Refills: 0 | Status: DISCONTINUED | OUTPATIENT
Start: 2025-03-26 | End: 2025-04-04

## 2025-03-26 RX ORDER — SODIUM BICARBONATE 1 MEQ/ML
650 SYRINGE (ML) INTRAVENOUS
Refills: 0 | Status: DISCONTINUED | OUTPATIENT
Start: 2025-03-26 | End: 2025-04-02

## 2025-03-26 RX ADMIN — TAMSULOSIN HYDROCHLORIDE 0.4 MILLIGRAM(S): 0.4 CAPSULE ORAL at 22:29

## 2025-03-26 RX ADMIN — Medication 2 SPRAY(S): at 10:02

## 2025-03-26 RX ADMIN — FUROSEMIDE 40 MILLIGRAM(S): 10 INJECTION INTRAMUSCULAR; INTRAVENOUS at 21:35

## 2025-03-26 RX ADMIN — Medication 5 MILLILITER(S): at 10:02

## 2025-03-26 RX ADMIN — Medication 100 MILLIGRAM(S): at 15:48

## 2025-03-26 RX ADMIN — Medication 650 MILLIGRAM(S): at 18:27

## 2025-03-26 RX ADMIN — FOLIC ACID 1 MILLIGRAM(S): 1 TABLET ORAL at 12:31

## 2025-03-26 RX ADMIN — ATORVASTATIN CALCIUM 20 MILLIGRAM(S): 80 TABLET, FILM COATED ORAL at 22:29

## 2025-03-26 RX ADMIN — CLOPIDOGREL BISULFATE 75 MILLIGRAM(S): 75 TABLET, FILM COATED ORAL at 12:31

## 2025-03-26 RX ADMIN — FUROSEMIDE 40 MILLIGRAM(S): 10 INJECTION INTRAMUSCULAR; INTRAVENOUS at 12:35

## 2025-03-26 NOTE — CONSULT NOTE ADULT - ASSESSMENT
Assessment  HFrEF NYHA Class III Stage C with moderate volume overload ( prelimnary echo today EF 30% mod MR TAVR normal gradients)  ESRD not on HD with sig volume overload  Ischemic CM s/p remote CABG/post CABG PCI most recently 2021  Moderate functional MR  s/p TAVR working well  PAF in SR on amiodarone/eliquis  epistaxis and hematochezia  BiV ICD in place      Rec  Admit tele  Dc eliquis - pt not candidate - will consider outpt eval for LAAO  cont amio/low dose asa  IV lasix 40 Q 12  Switch toprol to Coreg  Add low dose hydralazine for afterload reduction  renal consult  will follow

## 2025-03-26 NOTE — ED ADULT TRIAGE NOTE - CHIEF COMPLAINT QUOTE
BIBEMS from home for an atraumatic nosebleed that started around 07:00AM. Pt states that he woke up with a cough, which prompted his nose to bleed. On Eliquis. Reports this happened once in the past several years and he was cauterized. Bleeding controlled at this time.

## 2025-03-26 NOTE — CONSULT NOTE ADULT - SUBJECTIVE AND OBJECTIVE BOX
CC: Epistaxis    HPI:  74M with PMHx of Paroxysmal Afib (on Eliquis) HFrEF, CKD3b, HTN, HLD, BPH presented to [facility] with epistaxis and cough. Patient stated that he had the cough since yesterday, woke up in a coughing fit and nosebleed from left nare  Rhino rocket was placed with adequate control of hemostasis        PAST MEDICAL & SURGICAL HISTORY:  Hypertension  CKD (chronic kidney disease)  Afib  HLD (hyperlipidemia)  Presence of stent of CABG        Allergies  No Known Allergies    Intolerances      MEDICATIONS  (STANDING):  allopurinol 100 milliGRAM(s) Oral daily  aMIOdarone    Tablet 100 milliGRAM(s) Oral daily  atorvastatin 20 milliGRAM(s) Oral at bedtime  clopidogrel Tablet 75 milliGRAM(s) Oral daily  folic acid 1 milliGRAM(s) Oral daily  metoprolol succinate ER 25 milliGRAM(s) Oral daily  sodium bicarbonate 650 milliGRAM(s) Oral two times a day  tamsulosin 0.4 milliGRAM(s) Oral at bedtime    MEDICATIONS  (PRN):  acetaminophen     Tablet .. 650 milliGRAM(s) Oral every 6 hours PRN Temp greater or equal to 38C (100.4F), Mild Pain (1 - 3)  aluminum hydroxide/magnesium hydroxide/simethicone Suspension 30 milliLiter(s) Oral every 4 hours PRN Dyspepsia  melatonin 3 milliGRAM(s) Oral at bedtime PRN Insomnia  ondansetron Injectable 4 milliGRAM(s) IV Push every 8 hours PRN Nausea and/or Vomiting      Social History: **??**    Family history: **??**    ROS:   ENT: all negative except as noted in HPI   CV: denies palpitations  Pulm: denies SOB, cough, hemoptysis  GI: denies change in apetite, indigestion, n/v  : denies pertinent urinary symptoms, urgency  Neuro: denies numbness/tingling, loss of sensation  Psych: denies anxiety  MS: denies muscle weakness, instability  Heme: denies easy bruising or bleeding  Endo: denies heat/cold intolerance, excessive sweating  Vascular: denies LE edema    Vital Signs Last 24 Hrs  T(C): 36.8 (26 Mar 2025 15:41), Max: 36.8 (26 Mar 2025 15:41)  T(F): 98.3 (26 Mar 2025 15:41), Max: 98.3 (26 Mar 2025 15:41)  HR: 58 (26 Mar 2025 15:41) (56 - 61)  BP: 149/71 (26 Mar 2025 15:41) (140/69 - 189/92)  RR: 16 (26 Mar 2025 15:41) (16 - 18)  SpO2: 100% (26 Mar 2025 15:41) (99% - 100%)    Parameters below as of 26 Mar 2025 15:41  Patient On (Oxygen Delivery Method): room air                              8.6    7.12  )-----------( 136      ( 26 Mar 2025 10:15 )             27.1    03-26    147[H]  |  113[H]  |  50.1[H]  ----------------------------<  101[H]  3.9   |  21.0[L]  |  3.31[H]    Ca    7.6[L]      26 Mar 2025 10:15    TPro  6.3[L]  /  Alb  3.3  /  TBili  0.6  /  DBili  x   /  AST  14  /  ALT  11  /  AlkPhos  104  03-26       PHYSICAL EXAM:  Gen: NAD  Skin: No rashes, bruises, or lesions  Head: Normocephalic, Atraumatic  Face: no edema, erythema, or fluctuance. Parotid glands soft without mass  Eyes: no scleral injection  Ears: external ears without deformity, drainage, discoloration   Nose: Right naris with rhino rocket intact, no bleeding, left naris patent   Mouth: No Stridor / Drooling / Trismus.  Mucosa moist, tongue/uvula midline, oropharynx clear  Neck: Flat, supple, no lymphadenopathy, trachea midline, no masses  Lymphatic: No lymphadenopathy  Resp: breathing easily, no stridor  CV: no peripheral edema/cyanosis  GI: nondistended   Peripheral vascular: no JVD or edema  Neuro: facial nerve intact, no facial droop       CC: Epistaxis    HPI:  74M with PMHx of Paroxysmal Afib (on Eliquis) HFrEF, CKD3b, HTN, HLD, BPH presented to [facility] with epistaxis and cough. Patient stated that he had the cough since yesterday, woke up in a coughing fit and had epistaxis from left naris. Rhino rocket was placed in left naris with adequate hemostasis.         PAST MEDICAL & SURGICAL HISTORY:  Hypertension  CKD (chronic kidney disease)  Afib  HLD (hyperlipidemia)  Presence of stent of CABG        Allergies  No Known Allergies    Intolerances      MEDICATIONS  (STANDING):  allopurinol 100 milliGRAM(s) Oral daily  aMIOdarone    Tablet 100 milliGRAM(s) Oral daily  atorvastatin 20 milliGRAM(s) Oral at bedtime  clopidogrel Tablet 75 milliGRAM(s) Oral daily  folic acid 1 milliGRAM(s) Oral daily  metoprolol succinate ER 25 milliGRAM(s) Oral daily  sodium bicarbonate 650 milliGRAM(s) Oral two times a day  tamsulosin 0.4 milliGRAM(s) Oral at bedtime    MEDICATIONS  (PRN):  acetaminophen     Tablet .. 650 milliGRAM(s) Oral every 6 hours PRN Temp greater or equal to 38C (100.4F), Mild Pain (1 - 3)  aluminum hydroxide/magnesium hydroxide/simethicone Suspension 30 milliLiter(s) Oral every 4 hours PRN Dyspepsia  melatonin 3 milliGRAM(s) Oral at bedtime PRN Insomnia  ondansetron Injectable 4 milliGRAM(s) IV Push every 8 hours PRN Nausea and/or Vomiting      Social History:  No pertinent social hx     Family history:   No pertinent fhx     ROS:   ENT: all negative except as noted in HPI   CV: denies palpitations  Pulm: denies SOB, cough, hemoptysis  GI: denies change in apetite, indigestion, n/v  : denies pertinent urinary symptoms, urgency  Neuro: denies numbness/tingling, loss of sensation  Psych: denies anxiety  MS: denies muscle weakness, instability  Heme: denies easy bruising or bleeding  Endo: denies heat/cold intolerance, excessive sweating  Vascular: denies LE edema    Vital Signs Last 24 Hrs  T(C): 36.8 (26 Mar 2025 15:41), Max: 36.8 (26 Mar 2025 15:41)  T(F): 98.3 (26 Mar 2025 15:41), Max: 98.3 (26 Mar 2025 15:41)  HR: 58 (26 Mar 2025 15:41) (56 - 61)  BP: 149/71 (26 Mar 2025 15:41) (140/69 - 189/92)  RR: 16 (26 Mar 2025 15:41) (16 - 18)  SpO2: 100% (26 Mar 2025 15:41) (99% - 100%)    Parameters below as of 26 Mar 2025 15:41  Patient On (Oxygen Delivery Method): room air                              8.6    7.12  )-----------( 136      ( 26 Mar 2025 10:15 )             27.1    03-26    147[H]  |  113[H]  |  50.1[H]  ----------------------------<  101[H]  3.9   |  21.0[L]  |  3.31[H]    Ca    7.6[L]      26 Mar 2025 10:15    TPro  6.3[L]  /  Alb  3.3  /  TBili  0.6  /  DBili  x   /  AST  14  /  ALT  11  /  AlkPhos  104  03-26       PHYSICAL EXAM:  Gen: NAD  Skin: No rashes, bruises, or lesions  Head: Normocephalic, Atraumatic  Face: no edema, erythema, or fluctuance. Parotid glands soft without mass  Eyes: no scleral injection  Ears: external ears without deformity, drainage, discoloration   Nose: Left naris with rhino rocket intact, no bleeding, right naris patent   Mouth: No Stridor / Drooling / Trismus.  Mucosa moist, tongue/uvula midline, posterior pharyngeal wall with minimal clotted blood streaking, no evidence of active bleed  Neck: Flat, supple, no lymphadenopathy, trachea midline, no masses  Lymphatic: No lymphadenopathy  Resp: breathing easily, no stridor  CV: no peripheral edema/cyanosis  GI: nondistended   Peripheral vascular: no JVD or edema  Neuro: facial nerve intact, no facial droop       CC: Epistaxis    HPI:  74M with PMHx of Paroxysmal Afib (on Eliquis) HFrEF, CKD3b, HTN, HLD, BPH presented to [facility] with epistaxis and cough. Patient stated that he had the cough since yesterday, woke up in a coughing fit and had epistaxis from left naris. He states he has a had a nosebleed in the past, but that was years back. Rhino rocket was placed in left naris with adequate hemostasis.         PAST MEDICAL & SURGICAL HISTORY:  Hypertension  CKD (chronic kidney disease)  Afib  HLD (hyperlipidemia)  Presence of stent of CABG        Allergies  No Known Allergies    Intolerances      MEDICATIONS  (STANDING):  allopurinol 100 milliGRAM(s) Oral daily  aMIOdarone    Tablet 100 milliGRAM(s) Oral daily  atorvastatin 20 milliGRAM(s) Oral at bedtime  clopidogrel Tablet 75 milliGRAM(s) Oral daily  folic acid 1 milliGRAM(s) Oral daily  metoprolol succinate ER 25 milliGRAM(s) Oral daily  sodium bicarbonate 650 milliGRAM(s) Oral two times a day  tamsulosin 0.4 milliGRAM(s) Oral at bedtime    MEDICATIONS  (PRN):  acetaminophen     Tablet .. 650 milliGRAM(s) Oral every 6 hours PRN Temp greater or equal to 38C (100.4F), Mild Pain (1 - 3)  aluminum hydroxide/magnesium hydroxide/simethicone Suspension 30 milliLiter(s) Oral every 4 hours PRN Dyspepsia  melatonin 3 milliGRAM(s) Oral at bedtime PRN Insomnia  ondansetron Injectable 4 milliGRAM(s) IV Push every 8 hours PRN Nausea and/or Vomiting      Social History:  No pertinent social hx     Family history:   No pertinent fhx     ROS:   ENT: all negative except as noted in HPI   CV: denies palpitations  Pulm: denies SOB, cough, hemoptysis  Neuro: denies numbness/tingling, loss of sensation  Heme: denies easy bruising or bleeding  Endo: denies heat/cold intolerance, excessive sweating    Vital Signs Last 24 Hrs  T(C): 36.8 (26 Mar 2025 15:41), Max: 36.8 (26 Mar 2025 15:41)  T(F): 98.3 (26 Mar 2025 15:41), Max: 98.3 (26 Mar 2025 15:41)  HR: 58 (26 Mar 2025 15:41) (56 - 61)  BP: 149/71 (26 Mar 2025 15:41) (140/69 - 189/92)  RR: 16 (26 Mar 2025 15:41) (16 - 18)  SpO2: 100% (26 Mar 2025 15:41) (99% - 100%)    Parameters below as of 26 Mar 2025 15:41  Patient On (Oxygen Delivery Method): room air                              8.6    7.12  )-----------( 136      ( 26 Mar 2025 10:15 )             27.1    03-26    147[H]  |  113[H]  |  50.1[H]  ----------------------------<  101[H]  3.9   |  21.0[L]  |  3.31[H]    Ca    7.6[L]      26 Mar 2025 10:15    TPro  6.3[L]  /  Alb  3.3  /  TBili  0.6  /  DBili  x   /  AST  14  /  ALT  11  /  AlkPhos  104  03-26       PHYSICAL EXAM:  Gen: NAD  Skin: No rashes, bruises, or lesions  Head: Normocephalic, Atraumatic  Face: no edema, erythema, or fluctuance.   Ears: external ears without deformity, drainage, discoloration   Nose: Left naris with rhino rocket intact, no bleeding, right naris patent   Mouth: No Stridor / Drooling / Trismus.  Mucosa moist, tongue/uvula midline, posterior pharyngeal wall with minimal clotted blood streaking, no evidence of active bleed  Neck: Flat, supple, no lymphadenopathy, trachea midline, no masses  Resp: breathing easily, no stridor

## 2025-03-26 NOTE — CONSULT NOTE ADULT - PROBLEM SELECTOR RECOMMENDATION 9
- Adequate control of epistaxis obtained with left rhino rocket   - OK to resume Eliquis and continue Plavix from ENT perspective   - Will keep packing in place x5 days, likely to be removed 3/31   - Strict blood pressure control.  - Gram-positive abx coverage for duration of packing placement  - Nasal saline, 2 sprays to both nares 4 times a day  - Avoid nasal trauma; no nose rubbing, blowing or manipulating nasal packing.  Sneeze with mouth open and pinching nares.  - Avoid bending with head blow the waist.    -No heavy lifting.

## 2025-03-26 NOTE — ED ADULT NURSE NOTE - NSFALLHARMRISKINTERV_ED_ALL_ED
Assistance OOB with selected safe patient handling equipment if applicable/Communicate risk of Fall with Harm to all staff, patient, and family/Monitor gait and stability/Provide patient with walking aids/Provide visual cue: red socks, yellow wristband, yellow gown, etc/Reinforce activity limits and safety measures with patient and family/Bed in lowest position, wheels locked, appropriate side rails in place/Call bell, personal items and telephone in reach/Instruct patient to call for assistance before getting out of bed/chair/stretcher/Non-slip footwear applied when patient is off stretcher/[place] to call system/Physically safe environment - no spills, clutter or unnecessary equipment/Purposeful Proactive Rounding/Room/bathroom lighting operational, light cord in reach

## 2025-03-26 NOTE — CONSULT NOTE ADULT - SUBJECTIVE AND OBJECTIVE BOX
[place] [facility]                                   [address] [place] [address]                                                      PHONE: [phone]                                                         FAX: [phone]  [email or web address]  ---------------------------------------------------------------------------------------------------------------------------------    Reason for Consult: CHF    HPI:  DEIRDRE MORRIS is an 74y Male with HTN HLD CKD baseline creat 3.6 CAD s/p CABG X 4 2008, initial LV dysfunction s/p ICD , post CABG PCI (RCA 2020, M 2021) s/p TAVR 2021 PAF s/p DCCV 2022, BIV ICD upgrade 2023 augmented EF 55% by echo 8/2024, last seen in ofice 9/2024 clinically well compensated on GDMT.   Came to Er c/o cough/epistaxis and dyspnea, found to be in CHF, cardio consult called.    PAST MEDICAL & SURGICAL HISTORY:  Hypertension      CKD (chronic kidney disease)      Afib      HLD (hyperlipidemia)      Presence of stent of CABG          No Known Allergies      MEDICATIONS  (STANDING):  allopurinol 100 milliGRAM(s) Oral daily  aMIOdarone    Tablet 100 milliGRAM(s) Oral daily  atorvastatin 20 milliGRAM(s) Oral at bedtime  clopidogrel Tablet 75 milliGRAM(s) Oral daily  folic acid 1 milliGRAM(s) Oral daily  metoprolol succinate ER 25 milliGRAM(s) Oral daily  sodium bicarbonate 650 milliGRAM(s) Oral two times a day  tamsulosin 0.4 milliGRAM(s) Oral at bedtime    MEDICATIONS  (PRN):  acetaminophen     Tablet .. 650 milliGRAM(s) Oral every 6 hours PRN Temp greater or equal to 38C (100.4F), Mild Pain (1 - 3)  aluminum hydroxide/magnesium hydroxide/simethicone Suspension 30 milliLiter(s) Oral every 4 hours PRN Dyspepsia  melatonin 3 milliGRAM(s) Oral at bedtime PRN Insomnia  ondansetron Injectable 4 milliGRAM(s) IV Push every 8 hours PRN Nausea and/or Vomiting      Social History:  Cigarettes:          neg          Alchohol:         neg        Illicit Drug Abuse:  neg  neg FH    ROS: Negative other than as mentioned in HPI.    Vital Signs Last 24 Hrs  T(C): 36.5 (26 Mar 2025 08:54), Max: 36.5 (26 Mar 2025 08:54)  T(F): 97.7 (26 Mar 2025 08:54), Max: 97.7 (26 Mar 2025 08:54)  HR: 56 (26 Mar 2025 12:30) (56 - 61)  BP: 140/69 (26 Mar 2025 12:30) (140/69 - 189/92)  BP(mean): --  RR: 18 (26 Mar 2025 12:30) (18 - 18)  SpO2: 99% (26 Mar 2025 12:30) (99% - 99%)    Parameters below as of 26 Mar 2025 12:30  Patient On (Oxygen Delivery Method): room air      ICU Vital Signs Last 24 Hrs  DEIRDRE MORRIS  I&O's Detail    I&O's Summary    Drug Dosing Weight  DEIRDRE ARTURO      PHYSICAL EXAM:  General: Appears alert and cooperative.  HEENT: Head; normocephalic, atraumatic.  Eyes: Pupils reactive, cornea wnl.  Neck: Supple, no nodes adenopathy, no NVD or carotid bruit or thyromegaly.  CARDIOVASCULAR: Normal S1 and S2, No murmur, rub, gallop or lift.   LUNGS: No rales, rhonchi or wheeze. Normal breath sounds bilaterally.  ABDOMEN: Soft, nontender without mass or organomegaly. bowel sounds normoactive.  EXTREMITIES: No clubbing, cyanosis or edema. Distal pulses wnl.   SKIN: warm and dry with normal turgor.  NEURO: Alert/oriented x 3/normal motor exam. No pathologic reflexes.    PSYCH: normal affect.        LABS:                        8.6    7.12  )-----------( 136      ( 26 Mar 2025 10:15 )             27.1     03-26    147[H]  |  113[H]  |  50.1[H]  ----------------------------<  101[H]  3.9   |  21.0[L]  |  3.31[H]    Ca    7.6[L]      26 Mar 2025 10:15    TPro  6.3[L]  /  Alb  3.3  /  TBili  0.6  /  DBili  x   /  AST  14  /  ALT  11  /  AlkPhos  104  03-26    DEIRDRE MORRIS      Troponin T, High Sensitivity Result: 33: *  *  Rapid upward or downward changes in high-sensitivity troponin levels  suggest acute myocardial injury. Renal impairment may cause sustained  troponin elevations.  Normal: <6 - 14 ng/L  Indeterminate: 15-51 ng/L  Elevated: > 51 ng/L  See http://labs/test/[email or web address] on the [facility] intranet for more  information ng/L (03.26.25 @ 10:15)      Urinalysis Basic - ( 26 Mar 2025 10:15 )    Color: x / Appearance: x / SG: x / pH: x  Gluc: 101 mg/dL / Ketone: x  / Bili: x / Urobili: x   Blood: x / Protein: x / Nitrite: x   Leuk Esterase: x / RBC: x / WBC x   Sq Epi: x / Non Sq Epi: x / Bacteria: x        RADIOLOGY & ADDITIONAL STUDIES:< from: Xray Chest 2 Views PA/Lat (03.26.25 @ 09:52) >  IMPRESSION: Slight prominence to parahilar interstitial markings in the   patient with significant cardiomegaly. Pacer and sternotomy as before.   Aortic valve stent graft as before. One should consider some fluid   overload. Regional osseous structures appropriate for age    --- End of Report ---            DEIRDRE SELBY MD; Attending Radiologist  This document has been electronically signed. Mar 26 2025 10:06AM    < end of copied text >      INTERPRETATION OF TELEMETRY (personally reviewed):    ECG:    ECHO: 8/2024: LVEF 50-55%, mild MR, normal TAVR gradients    STRESS TEST: office 7/2024 PET: moderate anteroapical ischemia EF 45%      CARDIAC CATHETERIZATION:                         [place] [facility]                                   [address] [place] [address]                                                      PHONE: [phone]                                                         FAX: [phone]  [email or web address]  ---------------------------------------------------------------------------------------------------------------------------------    Reason for Consult: CHF    HPI:  DEIRDRE MORRIS is an 74y Male with HTN HLD CKD baseline creat 3.6 CAD s/p CABG X 4 2008, initial LV dysfunction s/p ICD , post CABG PCI (RCA 2020, M 2021) s/p TAVR 2021 PAF s/p DCCV 2022, BIV ICD upgrade 2023 augmented EF 55% by echo 8/2024, last seen in ofice 9/2024 clinically well compensated on GDMT.   Came to Er c/o cough/epistaxis and dyspnea, found to be in CHF, cardio consult called.  pt reports several days of increasing SANDESR orthopnea and awakened by nonproductive cough.  In addition to eistaxis pt reports bloody bowel movement earlier today, chronic gait instability.  No evidence of chest pain    PAST MEDICAL & SURGICAL HISTORY:  Hypertension      CKD (chronic kidney disease)      Afib      HLD (hyperlipidemia)      Presence of stent of CABG          No Known Allergies      MEDICATIONS  (STANDING):  allopurinol 100 milliGRAM(s) Oral daily  aMIOdarone    Tablet 100 milliGRAM(s) Oral daily  atorvastatin 20 milliGRAM(s) Oral at bedtime  clopidogrel Tablet 75 milliGRAM(s) Oral daily  folic acid 1 milliGRAM(s) Oral daily  metoprolol succinate ER 25 milliGRAM(s) Oral daily  sodium bicarbonate 650 milliGRAM(s) Oral two times a day  tamsulosin 0.4 milliGRAM(s) Oral at bedtime    MEDICATIONS  (PRN):  acetaminophen     Tablet .. 650 milliGRAM(s) Oral every 6 hours PRN Temp greater or equal to 38C (100.4F), Mild Pain (1 - 3)  aluminum hydroxide/magnesium hydroxide/simethicone Suspension 30 milliLiter(s) Oral every 4 hours PRN Dyspepsia  melatonin 3 milliGRAM(s) Oral at bedtime PRN Insomnia  ondansetron Injectable 4 milliGRAM(s) IV Push every 8 hours PRN Nausea and/or Vomiting      Social History:  Cigarettes:          neg          Alchohol:         neg        Illicit Drug Abuse:  neg  neg FH    ROS: Negative other than as mentioned in HPI.    Vital Signs Last 24 Hrs  T(C): 36.5 (26 Mar 2025 08:54), Max: 36.5 (26 Mar 2025 08:54)  T(F): 97.7 (26 Mar 2025 08:54), Max: 97.7 (26 Mar 2025 08:54)  HR: 56 (26 Mar 2025 12:30) (56 - 61)  BP: 140/69 (26 Mar 2025 12:30) (140/69 - 189/92)  BP(mean): --  RR: 18 (26 Mar 2025 12:30) (18 - 18)  SpO2: 99% (26 Mar 2025 12:30) (99% - 99%)    Parameters below as of 26 Mar 2025 12:30  Patient On (Oxygen Delivery Method): room air      ICU Vital Signs Last 24 Hrs  DEIRDRE MORRIS  I&O's Detail    I&O's Summary    Drug Dosing Weight  DEIRDRE MORRIS      PHYSICAL EXAM:  General: Appears alert and cooperative.  HEENT: Head; normocephalic, atraumatic.  Eyes: Pupils reactive, cornea wnl.  Neck: Supple, no nodes adenopathy, no NVD or carotid bruit or thyromegaly.  CARDIOVASCULAR: Normal S1 and S2, No murmur, rub, gallop or lift.   LUNGS: No rales, rhonchi or wheeze. Normal breath sounds bilaterally.  ABDOMEN: Soft, nontender without mass or organomegaly. bowel sounds normoactive.  EXTREMITIES: No clubbing, cyanosis or edema. Distal pulses wnl.   SKIN: warm and dry with normal turgor.  NEURO: Alert/oriented x 3/normal motor exam. No pathologic reflexes.    PSYCH: normal affect.        LABS:                        8.6    7.12  )-----------( 136      ( 26 Mar 2025 10:15 )             27.1     03-26    147[H]  |  113[H]  |  50.1[H]  ----------------------------<  101[H]  3.9   |  21.0[L]  |  3.31[H]    Ca    7.6[L]      26 Mar 2025 10:15    TPro  6.3[L]  /  Alb  3.3  /  TBili  0.6  /  DBili  x   /  AST  14  /  ALT  11  /  AlkPhos  104  03-26    DEIRDRE MORRIS      Troponin T, High Sensitivity Result: 33: *  *  Rapid upward or downward changes in high-sensitivity troponin levels  suggest acute myocardial injury. Renal impairment may cause sustained  troponin elevations.  Normal: <6 - 14 ng/L  Indeterminate: 15-51 ng/L  Elevated: > 51 ng/L  See http://labs/test/[email or web address] on the [facility] for more  information ng/L (03.26.25 @ 10:15)      Urinalysis Basic - ( 26 Mar 2025 10:15 )    Color: x / Appearance: x / SG: x / pH: x  Gluc: 101 mg/dL / Ketone: x  / Bili: x / Urobili: x   Blood: x / Protein: x / Nitrite: x   Leuk Esterase: x / RBC: x / WBC x   Sq Epi: x / Non Sq Epi: x / Bacteria: x        RADIOLOGY & ADDITIONAL STUDIES:< from: Xray Chest 2 Views PA/Lat (03.26.25 @ 09:52) >  IMPRESSION: Slight prominence to parahilar interstitial markings in the   patient with significant cardiomegaly. Pacer and sternotomy as before.   Aortic valve stent graft as before. One should consider some fluid   overload. Regional osseous structures appropriate for age    --- End of Report ---            DEIRDRE SELBY MD; Attending Radiologist  This document has been electronically signed. Mar 26 2025 10:06AM    < end of copied text >      INTERPRETATION OF TELEMETRY (personally reviewed):    ECG:pending ( BASELINE DDD PPM)    ECHO: 8/2024: LVEF 50-55%, mild MR, normal TAVR gradients    STRESS TEST: office 7/2024 PET: moderate anteroapical ischemia EF 45%      CARDIAC CATHETERIZATION:

## 2025-03-26 NOTE — PATIENT PROFILE ADULT - FALL HARM RISK - HARM RISK INTERVENTIONS

## 2025-03-26 NOTE — CHART NOTE - NSCHARTNOTEFT_GEN_A_CORE
Called by Attending to assess patient, concern s/p large bloody BM, + orthostatics, BP stabilized after return to bed  RN states the BM was liquid, not formed at all and primarily very dark red in color. Unclear if there was any watery stool mixed in or just blood.  patient states he had a bloody BM earlier this morning, never prior to that, states his nose has been bleeding since yesterday but did once a long time ago when he worked at [employer] got up from bending over too quickly and had a bloody nose that resulted in him going to hospital and rec'd 2pints of blood and then outpatient cauterization to a vessel in his nose from ENT.  Patient denies any HA, dizziness, lightheadedness, CP, palpitation, SOB, ABD pain, N/V.    Patient seen and examined at bedside.    Vital Signs  T(C): 36.8 (03-26-25 @ 16:37), Max: 36.8 (03-26-25 @ 15:41)  HR: 61 (03-26-25 @ 16:37) (56 - 61)  BP: 166/77 (03-26-25 @ 16:37) (140/69 - 189/92)  RR: 16 (03-26-25 @ 16:37) (16 - 18)  SpO2: 97% (03-26-25 @ 16:37) (97% - 100%)    Physical Exam:  Gen: AOx3, laying in bed comfortably in NAD  HENT: Left nare with rhino-rocket in place, mucus membranes borderline dry  CV: S1, S2  Lungs: CTA b/l, unlabored respirations on RA  Abd: +bowel sounds, soft/nt  Ext: MAEx4, chronic vascular changes, non-pitting edema, dried blood noted around IV site right AC    A/P: GI bleed in setting of epistaxis   STAT Labs: CBC, BMP (earlier lab noted K 5.9 without intervention), Coags, T&S  Blood consent obtained  continue with tele monitoring, q4 vitals ordered  TTE reviewed, HFrEF 25-30% with mild pHTN, patient already treated with Lasix, may benefit from additional dose s/p transfusion if pRBC are warranted  GI consult tomorrow AM  PPI BID  NPO except meds in case need to be scoped  Attending aware of above  Endorsed to night team  RN to monitor, assess and escalate to PA PRN.  Will continue to monitor. Called by Attending to assess patient, concern s/p large bloody BM, + orthostatics, BP stabilized after return to bed  RN states the BM was liquid, not formed at all and primarily very dark red in color. Unclear if there was any watery stool mixed in or just blood.  patient states he had a bloody BM earlier this morning, never prior to that, states his nose has been bleeding since yesterday but did once a long time ago when he worked at [employer] got up from bending over too quickly and had a bloody nose that resulted in him going to hospital and rec'd 2pints of blood and then outpatient cauterization to a vessel in his nose from ENT.  Patient denies any HA, dizziness, lightheadedness, CP, palpitation, SOB, ABD pain, N/V.    Patient seen and examined at bedside.    Vital Signs  T(C): 36.8 (03-26-25 @ 16:37), Max: 36.8 (03-26-25 @ 15:41)  HR: 61 (03-26-25 @ 16:37) (56 - 61)  BP: 166/77 (03-26-25 @ 16:37) (140/69 - 189/92)  RR: 16 (03-26-25 @ 16:37) (16 - 18)  SpO2: 97% (03-26-25 @ 16:37) (97% - 100%)    Physical Exam:  Gen: AOx3, laying in bed comfortably in NAD  HENT: Left nare with rhino-rocket in place, mucus membranes borderline dry  CV: S1, S2  Lungs: CTA b/l, unlabored respirations on RA  Abd: +bowel sounds, soft/nt  Ext: MAEx4, chronic vascular changes, non-pitting edema, dried blood noted around IV site right AC    A/P: GI bleed in setting of epistaxis   STAT Labs: CBC, BMP (earlier lab noted K 5.9 without intervention), Coags, T&S  Plavix DC'd in light of active bleeding from multiple sites   Blood consent obtained  continue with tele monitoring, q4 vitals ordered  TTE reviewed, HFrEF 25-30% with mild pHTN, patient already treated with Lasix, may benefit from additional dose s/p transfusion if pRBC are warranted  GI consult tomorrow AM  PPI BID  NPO except meds in case need to be scoped  Attending aware of above  Endorsed to night team  RN to monitor, assess and escalate to PA PRN.  Will continue to monitor.

## 2025-03-26 NOTE — ED PROVIDER NOTE - OBJECTIVE STATEMENT
74-year-old male with a past medical history of A-fib on Eliquis, CKD, CHF, hypertension presents with epistaxis and cough.  Patient reports that he has had a cough since yesterday.  He woke up with a coughing fit and then started having a nosebleed from his left nare.  He reports mild dyspnea on exertion, no shortness of breath at rest, chest pain, fevers.  Cough is nonproductive.  No URI symptoms

## 2025-03-26 NOTE — CONSULT NOTE ADULT - ASSESSMENT
CKD(IIIb): baseline Screat 2.7mg/dL in recent past  Now ANGEL, in setting of decompensated CHF and poor BP control vs progression of renal disease  - avoid potential nephrotoxins  - adjust BP meds as needed to gradually lower BP  - diuretics as needed and accept azotemia  - further management as per cardiology  - trend labs, urine    Anemia: +CKD component  - check Fe studies  - consider adding KIARA once BP adequately controlled    RO:  - check phos, Vit D and PTH

## 2025-03-26 NOTE — ED ADULT NURSE NOTE - OBJECTIVE STATEMENT
A&O x 4 on Eliquis for AFIB presents to ED with nosebleed. Bleeding started after waking up from coughing fit. Airway patent. Bleeding controlled with guaze. Bed locked and in lowest position. Call bell within reach. Able to make needs known.

## 2025-03-26 NOTE — CONSULT NOTE ADULT - SUBJECTIVE AND OBJECTIVE BOX
HPI: The pt is a 73 yo male (sees Dr Heard in our office) PMH + CKD (IIIb) who presents to the ED with epistaxis and cough.  Pt also with dyspnea and poorly controlled HTN.  We are asked to evaluate regarding his renal disease.      PAST MEDICAL & SURGICAL HISTORY:  Hypertension    CKD (chronic kidney disease)    Afib    HLD (hyperlipidemia)    CABG ('2008)    TAVR    Cardiomyopathy s/p ICD      FAMILY HISTORY:  FHx: heart disease (Father)      Social History:  No current tobacco; no etoh nor drug abuse    MEDICATIONS  (STANDING):  allopurinol 100 milliGRAM(s) Oral daily  aMIOdarone    Tablet 100 milliGRAM(s) Oral daily  atorvastatin 20 milliGRAM(s) Oral at bedtime  clopidogrel Tablet 75 milliGRAM(s) Oral daily  folic acid 1 milliGRAM(s) Oral daily  metoprolol succinate ER 25 milliGRAM(s) Oral daily  sodium bicarbonate 650 milliGRAM(s) Oral two times a day  tamsulosin 0.4 milliGRAM(s) Oral at bedtime    MEDICATIONS  (PRN):  acetaminophen     Tablet .. 650 milliGRAM(s) Oral every 6 hours PRN Temp greater or equal to 38C (100.4F), Mild Pain (1 - 3)  aluminum hydroxide/magnesium hydroxide/simethicone Suspension 30 milliLiter(s) Oral every 4 hours PRN Dyspepsia  melatonin 3 milliGRAM(s) Oral at bedtime PRN Insomnia  ondansetron Injectable 4 milliGRAM(s) IV Push every 8 hours PRN Nausea and/or Vomiting      Allergies    No Known Allergies    Intolerances        REVIEW OF SYSTEMS:  CONSTITUTIONAL: No fever, weight loss, + fatigue  EYES: No eye pain, visual disturbances, or discharge  ENMT:  No difficulty hearing, tinnitus, vertigo; No sinus or throat pain  NECK: No pain or stiffness  BREASTS: No pain, masses, or nipple discharge  RESPIRATORY: + cough, no wheezing, chills or hemoptysis; + occ shortness of breath  CARDIOVASCULAR: No chest pain, palpitations, dizziness, or leg swelling  GASTROINTESTINAL: No abdominal or epigastric pain. No nausea, vomiting, or hematemesis; No diarrhea or constipation. No melena or hematochezia.  GENITOURINARY: No dysuria, frequency, hematuria, or incontinence  NEUROLOGICAL: No headaches, memory loss, loss of strength, numbness, or tremors  SKIN: No itching, burning, rashes, or lesions   MUSCULOSKELETAL: No joint pain or swelling; No muscle, back, or extremity pain  PSYCHIATRIC: No depression, anxiety, mood swings, or difficulty sleeping      Vital Signs Last 24 Hrs  T(C): 36.5 (26 Mar 2025 08:54), Max: 36.5 (26 Mar 2025 08:54)  T(F): 97.7 (26 Mar 2025 08:54), Max: 97.7 (26 Mar 2025 08:54)  HR: 56 (26 Mar 2025 12:30) (56 - 61)  BP: 140/69 (26 Mar 2025 12:30) (140/69 - 189/92)  BP(mean): --  RR: 18 (26 Mar 2025 12:30) (18 - 18)  SpO2: 99% (26 Mar 2025 12:30) (99% - 99%)    Parameters below as of 26 Mar 2025 12:30  Patient On (Oxygen Delivery Method): room air        PHYSICAL EXAM:  GENERAL: Fatigued, weak  HEAD:  Atraumatic, Normocephalic  EYES: Conjunctiva and sclera clear  ENMT: Moist mucous membranes, Good dentition, No lesions  NECK: Supple, No JVD  NERVOUS SYSTEM:  Alert & Oriented X3, intact and symmetric  CHEST/LUNG: Diminished BS, rales  HEART: Regular rate and rhythm; No rub  ABDOMEN: Soft, Nontender, +BS  EXTREMITIES: + dependent edema  SKIN: No rashes or lesions      LABS:                        8.6    7.12  )-----------( 136      ( 26 Mar 2025 10:15 )             27.1     03-26    147[H]  |  113[H]  |  50.1[H]  ----------------------------<  101[H]  3.9   |  21.0[L]  |  3.31[H]    Ca    7.6[L]      26 Mar 2025 10:15    TPro  6.3[L]  /  Alb  3.3  /  TBili  0.6  /  DBili  x   /  AST  14  /  ALT  11  /  AlkPhos  104  03-26      Urinalysis Basic - ( 26 Mar 2025 10:15 )    Color: x / Appearance: x / SG: x / pH: x  Gluc: 101 mg/dL / Ketone: x  / Bili: x / Urobili: x   Blood: x / Protein: x / Nitrite: x   Leuk Esterase: x / RBC: x / WBC x   Sq Epi: x / Non Sq Epi: x / Bacteria: x          RADIOLOGY & ADDITIONAL TESTS:  < from: Xray Chest 2 Views PA/Lat (03.26.25 @ 09:52) >    ACC: [id number] EXAM:  XR CHEST PA LAT 2V   ORDERED BY: RITA GOODRICH     PROCEDURE DATE:  03/26/2025          INTERPRETATION:  EXAM: XR CHEST PA AND LATERAL    INDICATION: cough, sob XXR    COMPARISON: April 17, 2024    IMPRESSION: Slight prominence to parahilar interstitial markings in the   patient with significant cardiomegaly. Pacer and sternotomy as before.   Aortic valve stent graft as before. One should consider some fluid   overload. Regional osseous structures appropriate for age    < end of copied text >     HPI: The pt is a 75 yo male (sees Dr Heard in our office) PMH + CKD (IIIb) who presents to the ED with epistaxis and cough.  Pt also with dyspnea and poorly controlled HTN.  We are asked to evaluate regarding his renal disease.      PAST MEDICAL & SURGICAL HISTORY:  Hypertension    CKD (chronic kidney disease)    Afib    HLD (hyperlipidemia)    CABG ('2008)    TAVR    Cardiomyopathy s/p ICD      FAMILY HISTORY:  FHx: heart disease (Father)      Social History:  No current tobacco; no etoh nor drug abuse    MEDICATIONS  (STANDING):  allopurinol 100 milliGRAM(s) Oral daily  aMIOdarone    Tablet 100 milliGRAM(s) Oral daily  atorvastatin 20 milliGRAM(s) Oral at bedtime  clopidogrel Tablet 75 milliGRAM(s) Oral daily  folic acid 1 milliGRAM(s) Oral daily  metoprolol succinate ER 25 milliGRAM(s) Oral daily  sodium bicarbonate 650 milliGRAM(s) Oral two times a day  tamsulosin 0.4 milliGRAM(s) Oral at bedtime    MEDICATIONS  (PRN):  acetaminophen     Tablet .. 650 milliGRAM(s) Oral every 6 hours PRN Temp greater or equal to 38C (100.4F), Mild Pain (1 - 3)  aluminum hydroxide/magnesium hydroxide/simethicone Suspension 30 milliLiter(s) Oral every 4 hours PRN Dyspepsia  melatonin 3 milliGRAM(s) Oral at bedtime PRN Insomnia  ondansetron Injectable 4 milliGRAM(s) IV Push every 8 hours PRN Nausea and/or Vomiting      Allergies    No Known Allergies    Intolerances        REVIEW OF SYSTEMS:  CONSTITUTIONAL: No fever, weight loss, + fatigue  EYES: No eye pain, visual disturbances, or discharge  ENMT:  No difficulty hearing, tinnitus, vertigo; No sinus or throat pain  NECK: No pain or stiffness  BREASTS: No pain, masses, or nipple discharge  RESPIRATORY: + cough, no wheezing, chills or hemoptysis; + occ shortness of breath  CARDIOVASCULAR: No chest pain, palpitations, dizziness, or leg swelling  GASTROINTESTINAL: No abdominal or epigastric pain. No nausea, vomiting, or hematemesis; No diarrhea or constipation. No melena or hematochezia.  GENITOURINARY: No dysuria, frequency, hematuria, or incontinence  NEUROLOGICAL: No headaches, memory loss, loss of strength, numbness, or tremors  SKIN: No itching, burning, rashes, or lesions   MUSCULOSKELETAL: No joint pain or swelling; No muscle, back, or extremity pain  PSYCHIATRIC: No depression, anxiety, mood swings, or difficulty sleeping      Vital Signs Last 24 Hrs  T(C): 36.5 (26 Mar 2025 08:54), Max: 36.5 (26 Mar 2025 08:54)  T(F): 97.7 (26 Mar 2025 08:54), Max: 97.7 (26 Mar 2025 08:54)  HR: 56 (26 Mar 2025 12:30) (56 - 61)  BP: 140/69 (26 Mar 2025 12:30) (140/69 - 189/92)  BP(mean): --  RR: 18 (26 Mar 2025 12:30) (18 - 18)  SpO2: 99% (26 Mar 2025 12:30) (99% - 99%)    Parameters below as of 26 Mar 2025 12:30  Patient On (Oxygen Delivery Method): room air        PHYSICAL EXAM:  GENERAL:   HEAD:    EYES:   ENMT:   NECK:   NERVOUS SYSTEM:    CHEST/LUNG:   HEART:   ABDOMEN:   EXTREMITIES:  SKIN:       LABS:                        8.6    7.12  )-----------( 136      ( 26 Mar 2025 10:15 )             27.1     03-26    147[H]  |  113[H]  |  50.1[H]  ----------------------------<  101[H]  3.9   |  21.0[L]  |  3.31[H]    Ca    7.6[L]      26 Mar 2025 10:15    TPro  6.3[L]  /  Alb  3.3  /  TBili  0.6  /  DBili  x   /  AST  14  /  ALT  11  /  AlkPhos  104  03-26      Urinalysis Basic - ( 26 Mar 2025 10:15 )    Color: x / Appearance: x / SG: x / pH: x  Gluc: 101 mg/dL / Ketone: x  / Bili: x / Urobili: x   Blood: x / Protein: x / Nitrite: x   Leuk Esterase: x / RBC: x / WBC x   Sq Epi: x / Non Sq Epi: x / Bacteria: x          RADIOLOGY & ADDITIONAL TESTS:  < from: Xray Chest 2 Views PA/Lat (03.26.25 @ 09:52) >    ACC: [id number] EXAM:  XR CHEST PA LAT 2V   ORDERED BY: RITA GOODRICH     PROCEDURE DATE:  03/26/2025          INTERPRETATION:  EXAM: XR CHEST PA AND LATERAL    INDICATION: cough, sob XXR    COMPARISON: April 17, 2024    IMPRESSION: Slight prominence to parahilar interstitial markings in the   patient with significant cardiomegaly. Pacer and sternotomy as before.   Aortic valve stent graft as before. One should consider some fluid   overload. Regional osseous structures appropriate for age    < end of copied text >   pt's chart reviewed; full consult to follow

## 2025-03-26 NOTE — CONSULT NOTE ADULT - ASSESSMENT
74M with PMHx of Paroxysmal Afib (on Eliquis) HFrEF, CKD3b, HTN, HLD, BPH presented to [facility] with epistaxis and cough. Patient stated that he had the cough since yesterday, woke up in a coughing fit and had epistaxis from left naris. Rhino rocket was placed in left naris with adequate hemostasis.  74M with PMHx of Paroxysmal Afib (on Eliquis) HFrEF, CKD3b, HTN, HLD, BPH presented to [facility] with epistaxis and cough. Patient stated that he had the cough since yesterday, woke up in a coughing fit and had epistaxis from left naris. Rhino rocket was placed in left naris with adequate hemostasis.

## 2025-03-26 NOTE — H&P ADULT - ASSESSMENT
75 y/o M w/ a hx of HFrEF, Paroxysmal Afib on AC, CKD3b, HTN, HLD, BPH here for mgmt of Acute On Chronic HFrEF and Epistaxis    #Acute On Chronic HFrEF  #HTN  #Paroxysmal Atrial Fibrillation on AC  #HLD  - Tele/  - [facility] cardiology  - s/p 40mg Lasix IVP in ED, c/w Lasix 40mg IVP Q12  - amiodarone 100mg Q24  - lipitor  - HOLD spironolactone in the setting of ANGEL on CKD3b  - Clopidogrel  - HOLD Eliquis at this time for epistaxis, restart tomorrow if stable    #Epistaxis  - controlled in ED  - HOLD AC as above  - ENT  - restart AC pending ENT    #ANGEL on CKD3b  - likely 2/2 fluid overload  - bicarb  - lasix  - monitor cr daily  - caution w/ nephrotoxic agents    #BPH  - flomax    #Healthcare Maintenance  DVT PPx - SCDs  Dispo: Medically Acute, Admit to medicine, pending inpatient workup

## 2025-03-26 NOTE — H&P ADULT - NSHPPHYSICALEXAM_GEN_ALL_CORE
VITALS:   T(C): 36.5 (03-26-25 @ 08:54), Max: 36.5 (03-26-25 @ 08:54)  HR: 61 (03-26-25 @ 08:54) (61 - 61)  BP: 189/92 (03-26-25 @ 08:54) (189/92 - 189/92)  RR: 18 (03-26-25 @ 08:54) (18 - 18)  SpO2: 99% (03-26-25 @ 08:54) (99% - 99%)    GENERAL: NAD, lying in bed comfortably  HEAD:  Atraumatic, Normocephalic  EYES: EOMI, PERRLA, conjunctiva and sclera clear  ENT: Moist mucous membranes  NECK: Supple, No JVD  CHEST/LUNG: Clear to auscultation bilaterally; No rales, rhonchi, wheezing, or rubs. Unlabored respirations  HEART: Regular rate and rhythm; No murmurs, rubs, or gallops  ABDOMEN: BSx4; Soft, nontender, nondistended  EXTREMITIES:  2+ Peripheral Pulses, brisk capillary refill. No clubbing, cyanosis, or edema  NERVOUS SYSTEM:  A&Ox3, no focal deficits   SKIN: No rashes or lesions  PSYCH: Normal affect, euthymic mood

## 2025-03-26 NOTE — ED PROVIDER NOTE - CARE PLAN
1 Principal Discharge DX:	Acute on chronic combined systolic and diastolic congestive heart failure  Secondary Diagnosis:	Epistaxis

## 2025-03-26 NOTE — PATIENT PROFILE ADULT - NSPROPASSIVESMOKEEXPOSURE_GEN_A_NUR
[FreeTextEntry1] : The patient overall is doing well. He'll go for general blood work to include a fasting lipid profile as well as an A1c. I am concerned about his exertional symptoms. It is more than 3 years since his last stress test. He will undergo a nuclear stress test. Also he'll schedule an echocardiogram to check on his mechanical aortic valve. The gradient across the bowel has been going up.\par \par He'll stay on his present medication. I will discuss with him the results of these tests and decide if we need to do anything further.\par \par Pending the above I would see the patient again in approximately 6 months. He knows to call me if any symptoms should change.
No

## 2025-03-26 NOTE — H&P ADULT - HISTORY OF PRESENT ILLNESS
73 y/o M w/ a hx of HFrEF, Paroxysmal Afib on AC, CKD3b, HTN, HLD, BPH presented with epistaxis and cough. Stated that he had the cough since yesterday, woke up in a coughing fit and nosebleed from left nare, came to ED, nosebleed controlled, however patient having audible rales w/ elevated BP. Saturating well on room air, non productive cough, denies HA, CP, NVD, Lethargy.

## 2025-03-27 LAB
A1C WITH ESTIMATED AVERAGE GLUCOSE RESULT: 4.7 % — SIGNIFICANT CHANGE UP (ref 4–5.6)
ALBUMIN SERPL ELPH-MCNC: 3.3 G/DL — SIGNIFICANT CHANGE UP (ref 3.3–5.2)
ALP SERPL-CCNC: 97 U/L — SIGNIFICANT CHANGE UP (ref 40–120)
ALT FLD-CCNC: 9 U/L — SIGNIFICANT CHANGE UP
ANION GAP SERPL CALC-SCNC: 16 MMOL/L — SIGNIFICANT CHANGE UP (ref 5–17)
ANION GAP SERPL CALC-SCNC: 18 MMOL/L — HIGH (ref 5–17)
APTT BLD: 33.4 SEC — SIGNIFICANT CHANGE UP (ref 24.5–35.6)
AST SERPL-CCNC: 11 U/L — SIGNIFICANT CHANGE UP
BASOPHILS # BLD AUTO: 0.03 K/UL — SIGNIFICANT CHANGE UP (ref 0–0.2)
BASOPHILS NFR BLD AUTO: 0.4 % — SIGNIFICANT CHANGE UP (ref 0–2)
BILIRUB SERPL-MCNC: 0.9 MG/DL — SIGNIFICANT CHANGE UP (ref 0.4–2)
BUN SERPL-MCNC: 77.2 MG/DL — HIGH (ref 8–20)
BUN SERPL-MCNC: 79 MG/DL — HIGH (ref 8–20)
CALCIUM SERPL-MCNC: 7.7 MG/DL — LOW (ref 8.4–10.5)
CALCIUM SERPL-MCNC: 7.7 MG/DL — LOW (ref 8.4–10.5)
CHLORIDE SERPL-SCNC: 113 MMOL/L — HIGH (ref 96–108)
CHLORIDE SERPL-SCNC: 115 MMOL/L — HIGH (ref 96–108)
CHOLEST SERPL-MCNC: 68 MG/DL — SIGNIFICANT CHANGE UP
CO2 SERPL-SCNC: 18 MMOL/L — LOW (ref 22–29)
CO2 SERPL-SCNC: 20 MMOL/L — LOW (ref 22–29)
CREAT SERPL-MCNC: 3.58 MG/DL — HIGH (ref 0.5–1.3)
CREAT SERPL-MCNC: 3.6 MG/DL — HIGH (ref 0.5–1.3)
EGFR: 17 ML/MIN/1.73M2 — LOW
EOSINOPHIL # BLD AUTO: 0.05 K/UL — SIGNIFICANT CHANGE UP (ref 0–0.5)
EOSINOPHIL NFR BLD AUTO: 0.7 % — SIGNIFICANT CHANGE UP (ref 0–6)
ESTIMATED AVERAGE GLUCOSE: 88 MG/DL — SIGNIFICANT CHANGE UP (ref 68–114)
FERRITIN SERPL-MCNC: 417 NG/ML — HIGH (ref 30–400)
FOLATE SERPL-MCNC: >20 NG/ML — SIGNIFICANT CHANGE UP
GLUCOSE SERPL-MCNC: 101 MG/DL — HIGH (ref 70–99)
GLUCOSE SERPL-MCNC: 97 MG/DL — SIGNIFICANT CHANGE UP (ref 70–99)
HCT VFR BLD CALC: 25 % — LOW (ref 39–50)
HCT VFR BLD CALC: 26.6 % — LOW (ref 39–50)
HCT VFR BLD CALC: 27.1 % — LOW (ref 39–50)
HCT VFR BLD CALC: 29.2 % — LOW (ref 39–50)
HDLC SERPL-MCNC: 25 MG/DL — LOW
HGB BLD-MCNC: 8 G/DL — LOW (ref 13–17)
HGB BLD-MCNC: 8.5 G/DL — LOW (ref 13–17)
HGB BLD-MCNC: 8.7 G/DL — LOW (ref 13–17)
HGB BLD-MCNC: 9.3 G/DL — LOW (ref 13–17)
IMM GRANULOCYTES # BLD AUTO: 0.02 K/UL — SIGNIFICANT CHANGE UP (ref 0–0.07)
IMM GRANULOCYTES NFR BLD AUTO: 0.3 % — SIGNIFICANT CHANGE UP (ref 0–0.9)
INR BLD: 2.59 RATIO — HIGH (ref 0.85–1.16)
IRON SATN MFR SERPL: 142 UG/DL — SIGNIFICANT CHANGE UP (ref 59–158)
IRON SATN MFR SERPL: 65 % — HIGH (ref 16–55)
LDLC SERPL-MCNC: 26 MG/DL — SIGNIFICANT CHANGE UP
LIPID PNL WITH DIRECT LDL SERPL: 26 MG/DL — SIGNIFICANT CHANGE UP
LYMPHOCYTES # BLD AUTO: 0.8 K/UL — LOW (ref 1–3.3)
LYMPHOCYTES NFR BLD AUTO: 11.2 % — LOW (ref 13–44)
MAGNESIUM SERPL-MCNC: 2.4 MG/DL — SIGNIFICANT CHANGE UP (ref 1.6–2.6)
MCHC RBC-ENTMCNC: 31.8 G/DL — LOW (ref 32–36)
MCHC RBC-ENTMCNC: 32 G/DL — SIGNIFICANT CHANGE UP (ref 32–36)
MCHC RBC-ENTMCNC: 32 G/DL — SIGNIFICANT CHANGE UP (ref 32–36)
MCHC RBC-ENTMCNC: 32.1 G/DL — SIGNIFICANT CHANGE UP (ref 32–36)
MCHC RBC-ENTMCNC: 32.8 PG — SIGNIFICANT CHANGE UP (ref 27–34)
MCHC RBC-ENTMCNC: 32.9 PG — SIGNIFICANT CHANGE UP (ref 27–34)
MCHC RBC-ENTMCNC: 33.1 PG — SIGNIFICANT CHANGE UP (ref 27–34)
MCHC RBC-ENTMCNC: 33.1 PG — SIGNIFICANT CHANGE UP (ref 27–34)
MCV RBC AUTO: 102.7 FL — HIGH (ref 80–100)
MCV RBC AUTO: 103 FL — HIGH (ref 80–100)
MCV RBC AUTO: 103.2 FL — HIGH (ref 80–100)
MCV RBC AUTO: 103.3 FL — HIGH (ref 80–100)
MONOCYTES # BLD AUTO: 0.56 K/UL — SIGNIFICANT CHANGE UP (ref 0–0.9)
MONOCYTES NFR BLD AUTO: 7.8 % — SIGNIFICANT CHANGE UP (ref 2–14)
NEUTROPHILS # BLD AUTO: 5.7 K/UL — SIGNIFICANT CHANGE UP (ref 1.8–7.4)
NEUTROPHILS NFR BLD AUTO: 79.6 % — HIGH (ref 43–77)
NONHDLC SERPL-MCNC: 43 MG/DL — SIGNIFICANT CHANGE UP
NRBC # BLD AUTO: 0 K/UL — SIGNIFICANT CHANGE UP (ref 0–0)
NRBC # FLD: 0 K/UL — SIGNIFICANT CHANGE UP (ref 0–0)
NRBC BLD AUTO-RTO: 0 /100 WBCS — SIGNIFICANT CHANGE UP (ref 0–0)
PLATELET # BLD AUTO: 123 K/UL — LOW (ref 150–400)
PLATELET # BLD AUTO: 134 K/UL — LOW (ref 150–400)
PLATELET # BLD AUTO: 139 K/UL — LOW (ref 150–400)
PLATELET # BLD AUTO: 150 K/UL — SIGNIFICANT CHANGE UP (ref 150–400)
PMV BLD: 10.7 FL — SIGNIFICANT CHANGE UP (ref 7–13)
PMV BLD: 11.1 FL — SIGNIFICANT CHANGE UP (ref 7–13)
PMV BLD: 11.3 FL — SIGNIFICANT CHANGE UP (ref 7–13)
PMV BLD: 11.3 FL — SIGNIFICANT CHANGE UP (ref 7–13)
POTASSIUM SERPL-MCNC: 3.3 MMOL/L — LOW (ref 3.5–5.3)
POTASSIUM SERPL-MCNC: 3.4 MMOL/L — LOW (ref 3.5–5.3)
POTASSIUM SERPL-SCNC: 3.3 MMOL/L — LOW (ref 3.5–5.3)
POTASSIUM SERPL-SCNC: 3.4 MMOL/L — LOW (ref 3.5–5.3)
PROT SERPL-MCNC: 6 G/DL — LOW (ref 6.6–8.7)
PROTHROM AB SERPL-ACNC: 29.8 SEC — HIGH (ref 9.9–13.4)
RBC # BLD: 2.42 M/UL — LOW (ref 4.2–5.8)
RBC # BLD: 2.59 M/UL — LOW (ref 4.2–5.8)
RBC # BLD: 2.63 M/UL — LOW (ref 4.2–5.8)
RBC # BLD: 2.83 M/UL — LOW (ref 4.2–5.8)
RBC # FLD: 14.9 % — HIGH (ref 10.3–14.5)
RBC # FLD: 15.3 % — HIGH (ref 10.3–14.5)
RBC # FLD: 15.3 % — HIGH (ref 10.3–14.5)
RBC # FLD: 15.4 % — HIGH (ref 10.3–14.5)
SODIUM SERPL-SCNC: 148 MMOL/L — HIGH (ref 135–145)
SODIUM SERPL-SCNC: 151 MMOL/L — HIGH (ref 135–145)
TIBC SERPL-MCNC: 219 UG/DL — LOW (ref 220–430)
TRANSFERRIN SERPL-MCNC: 153 MG/DL — LOW (ref 180–329)
TRIGL SERPL-MCNC: 79 MG/DL — SIGNIFICANT CHANGE UP
VIT B12 SERPL-MCNC: 324 PG/ML — SIGNIFICANT CHANGE UP (ref 232–1245)
WBC # BLD: 7.16 K/UL — SIGNIFICANT CHANGE UP (ref 3.8–10.5)
WBC # BLD: 8.37 K/UL — SIGNIFICANT CHANGE UP (ref 3.8–10.5)
WBC # BLD: 9.1 K/UL — SIGNIFICANT CHANGE UP (ref 3.8–10.5)
WBC # BLD: 9.15 K/UL — SIGNIFICANT CHANGE UP (ref 3.8–10.5)
WBC # FLD AUTO: 7.16 K/UL — SIGNIFICANT CHANGE UP (ref 3.8–10.5)
WBC # FLD AUTO: 8.37 K/UL — SIGNIFICANT CHANGE UP (ref 3.8–10.5)
WBC # FLD AUTO: 9.1 K/UL — SIGNIFICANT CHANGE UP (ref 3.8–10.5)
WBC # FLD AUTO: 9.15 K/UL — SIGNIFICANT CHANGE UP (ref 3.8–10.5)

## 2025-03-27 PROCEDURE — 99223 1ST HOSP IP/OBS HIGH 75: CPT | Mod: FS

## 2025-03-27 PROCEDURE — 99233 SBSQ HOSP IP/OBS HIGH 50: CPT

## 2025-03-27 PROCEDURE — 99231 SBSQ HOSP IP/OBS SF/LOW 25: CPT

## 2025-03-27 RX ORDER — NYSTATIN 100000 [USP'U]/G
1 CREAM TOPICAL
Refills: 0 | Status: DISCONTINUED | OUTPATIENT
Start: 2025-03-27 | End: 2025-04-04

## 2025-03-27 RX ORDER — CYANOCOBALAMIN 1000 UG/ML
1000 INJECTION INTRAMUSCULAR; SUBCUTANEOUS DAILY
Refills: 0 | Status: DISCONTINUED | OUTPATIENT
Start: 2025-03-27 | End: 2025-04-04

## 2025-03-27 RX ADMIN — Medication 40 MILLIEQUIVALENT(S): at 17:21

## 2025-03-27 RX ADMIN — NYSTATIN 1 APPLICATION(S): 100000 CREAM TOPICAL at 17:20

## 2025-03-27 RX ADMIN — CYANOCOBALAMIN 1000 MICROGRAM(S): 1000 INJECTION INTRAMUSCULAR; SUBCUTANEOUS at 22:03

## 2025-03-27 RX ADMIN — FOLIC ACID 1 MILLIGRAM(S): 1 TABLET ORAL at 11:24

## 2025-03-27 RX ADMIN — FUROSEMIDE 40 MILLIGRAM(S): 10 INJECTION INTRAMUSCULAR; INTRAVENOUS at 17:21

## 2025-03-27 RX ADMIN — ATORVASTATIN CALCIUM 20 MILLIGRAM(S): 80 TABLET, FILM COATED ORAL at 22:03

## 2025-03-27 RX ADMIN — Medication 650 MILLIGRAM(S): at 05:23

## 2025-03-27 RX ADMIN — METOPROLOL SUCCINATE 25 MILLIGRAM(S): 50 TABLET, EXTENDED RELEASE ORAL at 05:22

## 2025-03-27 RX ADMIN — FUROSEMIDE 40 MILLIGRAM(S): 10 INJECTION INTRAMUSCULAR; INTRAVENOUS at 05:24

## 2025-03-27 RX ADMIN — TAMSULOSIN HYDROCHLORIDE 0.4 MILLIGRAM(S): 0.4 CAPSULE ORAL at 22:03

## 2025-03-27 RX ADMIN — Medication 100 MILLIEQUIVALENT(S): at 08:21

## 2025-03-27 RX ADMIN — Medication 100 MILLIGRAM(S): at 11:25

## 2025-03-27 RX ADMIN — Medication 100 MILLIEQUIVALENT(S): at 09:18

## 2025-03-27 RX ADMIN — AMIODARONE HYDROCHLORIDE 100 MILLIGRAM(S): 50 INJECTION, SOLUTION INTRAVENOUS at 05:24

## 2025-03-27 RX ADMIN — Medication 40 MILLIGRAM(S): at 05:27

## 2025-03-27 RX ADMIN — Medication 650 MILLIGRAM(S): at 17:20

## 2025-03-27 RX ADMIN — Medication 40 MILLIGRAM(S): at 17:21

## 2025-03-27 NOTE — CONSULT NOTE ADULT - SUBJECTIVE AND OBJECTIVE BOX
Chief Complaint:  Patient is a 74y old  Male who presents with a chief complaint of Acute On Chronic HFrEF (27 Mar 2025 13:00)    HPI:  73 y/o M w/ a hx of HFrEF, Paroxysmal Afib on AC, CKD3b, HTN, HLD, BPH presented with epistaxis and cough. Stated that he had the cough since yesterday, woke up in a coughing fit and nosebleed from left nare, came to ED, nosebleed controlled, however patient having audible rales w/ elevated BP. Saturating well on room air, non productive cough, denies HA, CP, NVD, Lethargy. (26 Mar 2025 11:59).  Patient was seen by ENT, s/p Rhino tube placement to left nostril. Patient received 1 units of PRBC and his hemoglobin 8.7 gm this morning. (8.2-->8.0-->9.3-->8.7). GI consulted after black loose BM this morning. Reported 2 episodes of black liquid stool per patient. He denies abdominal pain, nausea, vomiting. He reported history of nose bleed in the past. He denies history of GI bleeding. His last EGD/ colonoscopy was 5-6 years ago and was reportedly normal. Last dose of Eliquis was yesterday. INR was 3.2  and patient received Tranexamic acid in ED. On examination, patient with Rhino packing, no visible nose bleeding. ASA performed and showed melena.       PAST MEDICAL & SURGICAL HISTORY:  Hypertension      CKD (chronic kidney disease)      Afib      HLD (hyperlipidemia)      Presence of stent of CABG    REVIEW OF SYSTEMS:   General: Negative  HEENT: Negative  CV: Negative  Respiratory: Negative  GI: See HPI  : Negative  MSK: Negative  Hematologic: Negative  Skin: Negative    MEDICATIONS:   MEDICATIONS  (STANDING):  allopurinol 100 milliGRAM(s) Oral daily  aMIOdarone    Tablet 100 milliGRAM(s) Oral daily  atorvastatin 20 milliGRAM(s) Oral at bedtime  cyanocobalamin 1000 MICROGram(s) Oral daily  folic acid 1 milliGRAM(s) Oral daily  furosemide   Injectable 40 milliGRAM(s) IV Push every 12 hours  influenza  Vaccine (HIGH DOSE) 0.5 milliLiter(s) IntraMuscular once  metoprolol succinate ER 25 milliGRAM(s) Oral daily  nystatin Powder 1 Application(s) Topical two times a day  pantoprazole  Injectable 40 milliGRAM(s) IV Push every 12 hours  potassium chloride    Tablet ER 40 milliEquivalent(s) Oral once  sodium bicarbonate 650 milliGRAM(s) Oral two times a day  tamsulosin 0.4 milliGRAM(s) Oral at bedtime    MEDICATIONS  (PRN):  acetaminophen     Tablet .. 650 milliGRAM(s) Oral every 6 hours PRN Temp greater or equal to 38C (100.4F), Mild Pain (1 - 3)  aluminum hydroxide/magnesium hydroxide/simethicone Suspension 30 milliLiter(s) Oral every 4 hours PRN Dyspepsia  melatonin 3 milliGRAM(s) Oral at bedtime PRN Insomnia  ondansetron Injectable 4 milliGRAM(s) IV Push every 8 hours PRN Nausea and/or Vomiting        ALLERGIES:   Allergies    No Known Allergies    Intolerances        Substance Use:   (  ) never used  (  )   Tobacco Usage:  (   ) never smoked   (   ) former smoker   (   ) current smoker  (     ) pack year  (        ) last cigarette date  Alcohol Usage: Denies       VITAL SIGNS:   Vital Signs Last 24 Hrs  T(C): 36.3 (27 Mar 2025 11:05), Max: 37.3 (26 Mar 2025 22:06)  T(F): 97.3 (27 Mar 2025 11:05), Max: 99.1 (26 Mar 2025 22:06)  HR: 62 (27 Mar 2025 13:23) (57 - 70)  BP: 132/72 (27 Mar 2025 13:23) (103/76 - 166/77)  BP(mean): 103 (26 Mar 2025 19:58) (85 - 107)  RR: 18 (27 Mar 2025 13:23) (16 - 20)  SpO2: 97% (27 Mar 2025 13:23) (93% - 100%)    Parameters below as of 27 Mar 2025 13:23  Patient On (Oxygen Delivery Method): room air      I&O's Summary    26 Mar 2025 07:01  -  27 Mar 2025 07:00  --------------------------------------------------------  IN: 0 mL / OUT: 2345 mL / NET: -2345 mL        PHYSICAL EXAM:   GENERAL:  No acute distress  HEENT:  sclera anicteric  CHEST:  No increased effort  HEART:  Regular rate  ABDOMEN:  Soft, non-tender, non-distended, + bowel sounds, no rebound or guarding  Rectal Exam: Black color stool on glove   EXTREMITIES: No edema  SKIN:  no jaundice  NEURO:  Alert, oriented  Psych: Calm, cooperative      LABS:                        8.7    8.37  )-----------( 123      ( 27 Mar 2025 10:58 )             27.1     Hemoglobin: 8.7 g/dL (03-27-25 @ 10:58)  Hemoglobin: 9.3 g/dL (03-27-25 @ 06:38)  Hemoglobin: 8.0 g/dL (03-27-25 @ 04:01)  Hemoglobin: 8.2 g/dL (03-26-25 @ 19:00)  Hemoglobin: 8.6 g/dL (03-26-25 @ 10:15)    03-27    148[H]  |  113[H]  |  79.0[H]  ----------------------------<  101[H]  3.4[L]   |  20.0[L]  |  3.60[H]    Ca    7.7[L]      27 Mar 2025 04:48  Mg     2.4     03-27    TPro  6.0[L]  /  Alb  3.3  /  TBili  0.9  /  DBili  x   /  AST  11  /  ALT  9   /  AlkPhos  97  03-27    LIVER FUNCTIONS - ( 27 Mar 2025 04:01 )  Alb: 3.3 g/dL / Pro: 6.0 g/dL / ALK PHOS: 97 U/L / ALT: 9 U/L / AST: 11 U/L / GGT: x             PT/INR - ( 27 Mar 2025 04:01 )   PT: 29.8 sec;   INR: 2.59 ratio         PTT - ( 27 Mar 2025 04:01 )  PTT:33.4 sec      Triglycerides, Serum: 79 mg/dL (03-27-25 @ 04:01)

## 2025-03-27 NOTE — CHART NOTE - NSCHARTNOTEFT_GEN_A_CORE
RN called for pt with another episode of dark bowel movement. VSS. STAT CBC. Pending GI consult. RN advised to notify ACP for any further changes.

## 2025-03-27 NOTE — PROVIDER CONTACT NOTE (OTHER) - BACKGROUND
Pt admitted for epistaxis and cough.
Pt admitted with epistaxis and cough. Received 1 unit PRBC during this shift.
Pt admitted with epistaxis and cough. Pt received 1 unit PRBC on this shift.
pt presented to the ED with nose bleed. initial hgb 8.6.

## 2025-03-27 NOTE — PROGRESS NOTE ADULT - SUBJECTIVE AND OBJECTIVE BOX
ENT ISSUE/POD: L epistaxis s/p rapid rhino placement    Interval HPI: Pt denies bleeding from nares since placement of rapid rhino. S/p 1 unit PRBCs  9.3/23.2 this am       PAST MEDICAL & SURGICAL HISTORY:  Hypertension      CKD (chronic kidney disease)      Afib      HLD (hyperlipidemia)      Presence of stent of CABG        Allergies    No Known Allergies    Intolerances      MEDICATIONS  (STANDING):  allopurinol 100 milliGRAM(s) Oral daily  aMIOdarone    Tablet 100 milliGRAM(s) Oral daily  atorvastatin 20 milliGRAM(s) Oral at bedtime  folic acid 1 milliGRAM(s) Oral daily  furosemide   Injectable 40 milliGRAM(s) IV Push every 12 hours  influenza  Vaccine (HIGH DOSE) 0.5 milliLiter(s) IntraMuscular once  metoprolol succinate ER 25 milliGRAM(s) Oral daily  pantoprazole  Injectable 40 milliGRAM(s) IV Push every 12 hours  sodium bicarbonate 650 milliGRAM(s) Oral two times a day  tamsulosin 0.4 milliGRAM(s) Oral at bedtime    MEDICATIONS  (PRN):  acetaminophen     Tablet .. 650 milliGRAM(s) Oral every 6 hours PRN Temp greater or equal to 38C (100.4F), Mild Pain (1 - 3)  aluminum hydroxide/magnesium hydroxide/simethicone Suspension 30 milliLiter(s) Oral every 4 hours PRN Dyspepsia  melatonin 3 milliGRAM(s) Oral at bedtime PRN Insomnia  ondansetron Injectable 4 milliGRAM(s) IV Push every 8 hours PRN Nausea and/or Vomiting    ROS:   ENT: all negative except as noted in HPI   Pulm: denies SOB, cough, hemoptysis  Neuro: denies numbness/tingling, loss of sensation  Endo: denies heat/cold intolerance, excessive sweating      Vital Signs Last 24 Hrs  T(C): 36.9 (27 Mar 2025 07:06), Max: 37.3 (26 Mar 2025 22:06)  T(F): 98.4 (27 Mar 2025 07:06), Max: 99.1 (26 Mar 2025 22:06)  HR: 59 (27 Mar 2025 07:06) (56 - 70)  BP: 122/52 (27 Mar 2025 07:06) (103/76 - 166/77)  BP(mean): 103 (26 Mar 2025 19:58) (85 - 107)  RR: 18 (27 Mar 2025 07:06) (16 - 20)  SpO2: 98% (27 Mar 2025 07:06) (93% - 100%)    Parameters below as of 27 Mar 2025 07:06  Patient On (Oxygen Delivery Method): room air                              9.3    9.10  )-----------( 150      ( 27 Mar 2025 06:38 )             29.2    03-27    148[H]  |  113[H]  |  79.0[H]  ----------------------------<  101[H]  3.4[L]   |  20.0[L]  |  3.60[H]    Ca    7.7[L]      27 Mar 2025 04:48  Mg     2.4     03-27    TPro  6.0[L]  /  Alb  3.3  /  TBili  0.9  /  DBili  x   /  AST  11  /  ALT  9   /  AlkPhos  97  03-27   PT/INR - ( 27 Mar 2025 04:01 )   PT: 29.8 sec;   INR: 2.59 ratio         PTT - ( 27 Mar 2025 04:01 )  PTT:33.4 sec    PHYSICAL EXAM:  Gen: NAD  Skin: No rashes, bruises, or lesions  Head: Normocephalic, Atraumatic  Face: no edema, erythema, or fluctuance. Parotid glands soft without mass  Eyes: no scleral injection  Nose: L rapid rhino in place, no bleeding form b/l nares   Mouth: No Stridor / Drooling / Trismus.  Mucosa moist, tongue/uvula midline, oropharynx clear, no blood or clots  Neck: Flat, supple, no lymphadenopathy, trachea midline, no masses  Lymphatic: No lymphadenopathy  Resp: breathing easily, no stridor  Neuro: facial nerve intact, no facial droop         ENT ISSUE/POD: L epistaxis s/p rapid rhino placement    Interval HPI: Pt denies bleeding from nares since placement of rapid rhino. S/p 1 unit PRBCs  9.3/23.2 this am       PAST MEDICAL & SURGICAL HISTORY:  Hypertension      CKD (chronic kidney disease)      Afib      HLD (hyperlipidemia)      Presence of stent of CABG        Allergies    No Known Allergies    Intolerances      MEDICATIONS  (STANDING):  allopurinol 100 milliGRAM(s) Oral daily  aMIOdarone    Tablet 100 milliGRAM(s) Oral daily  atorvastatin 20 milliGRAM(s) Oral at bedtime  folic acid 1 milliGRAM(s) Oral daily  furosemide   Injectable 40 milliGRAM(s) IV Push every 12 hours  influenza  Vaccine (HIGH DOSE) 0.5 milliLiter(s) IntraMuscular once  metoprolol succinate ER 25 milliGRAM(s) Oral daily  pantoprazole  Injectable 40 milliGRAM(s) IV Push every 12 hours  sodium bicarbonate 650 milliGRAM(s) Oral two times a day  tamsulosin 0.4 milliGRAM(s) Oral at bedtime    MEDICATIONS  (PRN):  acetaminophen     Tablet .. 650 milliGRAM(s) Oral every 6 hours PRN Temp greater or equal to 38C (100.4F), Mild Pain (1 - 3)  aluminum hydroxide/magnesium hydroxide/simethicone Suspension 30 milliLiter(s) Oral every 4 hours PRN Dyspepsia  melatonin 3 milliGRAM(s) Oral at bedtime PRN Insomnia  ondansetron Injectable 4 milliGRAM(s) IV Push every 8 hours PRN Nausea and/or Vomiting    ROS:   ENT: all negative except as noted in HPI   Pulm: denies SOB, cough, hemoptysis  Neuro: denies numbness/tingling, loss of sensation  Endo: denies heat/cold intolerance, excessive sweating      Vital Signs Last 24 Hrs  T(C): 36.9 (27 Mar 2025 07:06), Max: 37.3 (26 Mar 2025 22:06)  T(F): 98.4 (27 Mar 2025 07:06), Max: 99.1 (26 Mar 2025 22:06)  HR: 59 (27 Mar 2025 07:06) (56 - 70)  BP: 122/52 (27 Mar 2025 07:06) (103/76 - 166/77)  BP(mean): 103 (26 Mar 2025 19:58) (85 - 107)  RR: 18 (27 Mar 2025 07:06) (16 - 20)  SpO2: 98% (27 Mar 2025 07:06) (93% - 100%)    Parameters below as of 27 Mar 2025 07:06  Patient On (Oxygen Delivery Method): room air                              9.3    9.10  )-----------( 150      ( 27 Mar 2025 06:38 )             29.2    03-27    148[H]  |  113[H]  |  79.0[H]  ----------------------------<  101[H]  3.4[L]   |  20.0[L]  |  3.60[H]    Ca    7.7[L]      27 Mar 2025 04:48  Mg     2.4     03-27    TPro  6.0[L]  /  Alb  3.3  /  TBili  0.9  /  DBili  x   /  AST  11  /  ALT  9   /  AlkPhos  97  03-27  PT/INR - ( 27 Mar 2025 04:01 )   PT: 29.8 sec;   INR: 2.59 ratio    PTT - ( 27 Mar 2025 04:01 )  PTT:33.4 sec    PHYSICAL EXAM:  Gen: NAD  Skin: No rashes, bruises, or lesions  Head: Normocephalic, Atraumatic  Face: no edema, erythema, or fluctuance. Parotid glands soft without mass  Eyes: no scleral injection  Nose: L rapid rhino in place, no bleeding form b/l nares   Mouth: No Stridor / Drooling / Trismus.  Mucosa moist, tongue/uvula midline, oropharynx clear, no blood or clots  Neck: Flat, supple, no lymphadenopathy, trachea midline, no masses  Lymphatic: No lymphadenopathy  Resp: breathing easily, no stridor  Neuro: facial nerve intact, no facial droop

## 2025-03-27 NOTE — CONSULT NOTE ADULT - ASSESSMENT
73 y/o M w/ a hx of HFrEF, Paroxysmal Afib on AC, CKD3b, HTN, HLD, BPH presented with epistaxis and cough. Admitted with epistaxis.  GI consulted after black color stool.    Black color stool, r/o GI bleeding  Epistaxis  S/p Rhino pack by ENT, no visible bleeding post Rhino placement.   ASA showed black stool, most likely blood from nose bleed.   s/p 1 units of PRBC, Hgb remains stable post transfusion  Trend CBC, transfuse as needed  Protonix 40 mg daily as GI mucosal protection  Avoid NSAIDs  Prio EGD/ colonoscopy 5-6 years ago, reportedly normal  No indication for endoscopic evaluation at this time. GI will sign off at this time. Please consult with any questions of concern that requires inpatient GI evaluation

## 2025-03-27 NOTE — CONSULT NOTE ADULT - ASSESSMENT
CKD(IIIb): Screat 2.7mg/dL in recent past  Now ANGEL in setting of decompensated CHF/diuretics and poor BP control vs progression of disease vs both  - avoid potential nephrotoxins  - adjust BP meds as needed to gradually optimize   - diuretics as needed and accept azotemia  - cont as per cardiology  - renal sono  - trend labs    Anemia: +CKD + GI loss  - check Fe stores  - add KIARA once BP controlled and stable  - PRBCs as needed  - GI eval    RO:  - check phos, Vit D and iPTH

## 2025-03-27 NOTE — PROGRESS NOTE ADULT - SUBJECTIVE AND OBJECTIVE BOX
[facility]                                      [facility]                              [address] [place] [address]                                                 PHONE: [phone]                                                 FAX: [phone]  -----------------------------------------------------------------------------------------------  Pt seen and examined. FU for AF    Overnight events/Complaints: Pt with no further nose bleed but has very easy bruising.    RELEVANT PHYSICAL EXAM:  Neck: No obvious JVD  Cardiovascular: regular S1, S2  Respiratory: Lungs clear to auscultation; no crepitations, no wheeze  Musculoskeletal: No edema    Vital Signs Last 24 Hrs  T(C): 36.9 (27 Mar 2025 07:06), Max: 37.3 (26 Mar 2025 22:06)  T(F): 98.4 (27 Mar 2025 07:06), Max: 99.1 (26 Mar 2025 22:06)  HR: 59 (27 Mar 2025 07:06) (56 - 70)  BP: 122/52 (27 Mar 2025 07:06) (103/76 - 166/77)  BP(mean): 103 (26 Mar 2025 19:58) (85 - 107)  RR: 18 (27 Mar 2025 07:06) (16 - 20)  SpO2: 98% (27 Mar 2025 07:06) (93% - 100%)    Parameters below as of 27 Mar 2025 07:06  Patient On (Oxygen Delivery Method): room air      I&O's Summary    26 Mar 2025 07:01  -  27 Mar 2025 07:00  --------------------------------------------------------  IN: 0 mL / OUT: 2345 mL / NET: -2345 mL            LABS:                        9.3    9.10  )-----------( 150      ( 27 Mar 2025 06:38 )             29.2    03-27    148[H]  |  113[H]  |  79.0[H]  ----------------------------<  101[H]  3.4[L]   |  20.0[L]  |  3.60[H]    Ca    7.7[L]      27 Mar 2025 04:48  Mg     2.4     03-27    TPro  6.0[L]  /  Alb  3.3  /  TBili  0.9  /  DBili  x   /  AST  11  /  ALT  9   /  AlkPhos  97  03-27     PT/INR - ( 27 Mar 2025 04:01 )   PT: 29.8 sec;   INR: 2.59 ratio         PTT - ( 27 Mar 2025 04:01 )  PTT:33.4 sec    RADIOLOGY & ADDITIONAL STUDIES: (reviewed)  CXR was independently visualized/reviewed  and demonstrated:  Slight prominence to parahilar interstitial markings in the   patient with significant cardiomegaly. Pacer and sternotomy as before.   Aortic valve stent graft as before. One should consider some fluid   overload. Regional osseous structures appropriate for age    CARDIOLOGY TESTING:(reviewed)     TELEMETRY independently visualized/reviewed and demonstrated : NSR    12 lead EKG independently visualized/reviewed  and demonstrated     ECHO: 8/2024: LVEF 50-55%, mild MR, normal TAVR gradients    Echo:   1. Left ventricular cavity is normal in size. Left ventricular wall thickness is normal. Left ventricular systolic function is severely decreased with an ejection fraction visually estimated at 25 to 30 %.   2. The left ventricular diastolic function is indeterminate.   3. Moderately enlarged right ventricular cavity size and moderately to severely reduced right ventricular systolic function.   4. Left atrium is moderately dilated.   5. The right atrium is moderately dilated.   6. 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 No intravalvular or paravalvular regurgitation.   7. The peak transaortic velocity is 1.92 m/s, peak transaortic gradient is 14.7 mmHg and mean transaortic gradient is 7.0 mmHg with an LVOT/aortic valve VTI ratio of 0.40. The aortic valve acceleration time is 74 msec.   8. Mitral valve leaflets are diffusely calcified.   9. There is mild posterior calcification of the mitral valve annulus.  10. Moderate mitral regurgitation.  11. Moderate to severe tricuspid regurgitation.  12. Estimated pulmonary artery systolic pressure is 46 mmHg, mild pulmonary hypertension.  13. No pericardial effusion seen.    STRESS TEST: office 7/2024 PET: moderate anteroapical ischemia EF 45%    MEDICATIONS:(reviewed)    MEDICATIONS  (PRN):  acetaminophen     Tablet .. 650 milliGRAM(s) Oral every 6 hours PRN Temp greater or equal to 38C (100.4F), Mild Pain (1 - 3)  aluminum hydroxide/magnesium hydroxide/simethicone Suspension 30 milliLiter(s) Oral every 4 hours PRN Dyspepsia  melatonin 3 milliGRAM(s) Oral at bedtime PRN Insomnia  ondansetron Injectable 4 milliGRAM(s) IV Push every 8 hours PRN Nausea and/or Vomiting    MEDICATIONS  (STANDING):  allopurinol 100 milliGRAM(s) Oral daily  aMIOdarone    Tablet 100 milliGRAM(s) Oral daily  atorvastatin 20 milliGRAM(s) Oral at bedtime  folic acid 1 milliGRAM(s) Oral daily  furosemide   Injectable 40 milliGRAM(s) IV Push every 12 hours  influenza  Vaccine (HIGH DOSE) 0.5 milliLiter(s) IntraMuscular once  metoprolol succinate ER 25 milliGRAM(s) Oral daily  pantoprazole  Injectable 40 milliGRAM(s) IV Push every 12 hours  sodium bicarbonate 650 milliGRAM(s) Oral two times a day  tamsulosin 0.4 milliGRAM(s) Oral at bedtime      ASSESSMENT AND PLAN:    74y Male with HTN HLD CKD baseline creat 3.6 CAD s/p CABG X 4 2008, initial LV dysfunction s/p ICD , post CABG PCI (RCA 2020, M 2021) s/p TAVR 2021 PAF s/p DCCV 2022, BIV ICD upgrade 2023 augmented EF 55% by echo 8/2024, last seen in ofice 9/2024 clinically well compensated on GDMT.   Came to Er c/o cough/epistaxis and dyspnea. Pt reports several days of increasing SANDERS orthopnea and awakened by nonproductive cough.  In addition to epistaxis pt reports bloody bowel movement earlier today, chronic gait instability.  No evidence of chest pain    Shortness of breath   - related to anemia and CMP  - Continue iv lasix  - Continue toprol  - Defer ACE-I/ARB/ARNI given CRI    PAF  - Dc eliquis - pt not candidate - will consider outpt eval for LAAO    ICMP  - Echo with severe LV dysfunction and decreased LVEF  - Needs ischemic evaluation with cath given prior CAD but limited due to CKD    CAD  - Continue ASA    HLD/dyslipidemia  - Continue statin

## 2025-03-27 NOTE — PROGRESS NOTE ADULT - SUBJECTIVE AND OBJECTIVE BOX
Patient is a 74y old  Male with Acute On Chronic HFrEF      Pt is seen in am , he is with rash in his lower abd groin area   no SOB comfortable sitting in the bed       ALLERGIES:  No Known Allergies    MEDICATIONS  (STANDING):  allopurinol 100 milliGRAM(s) Oral daily  aMIOdarone    Tablet 100 milliGRAM(s) Oral daily  atorvastatin 20 milliGRAM(s) Oral at bedtime  folic acid 1 milliGRAM(s) Oral daily  furosemide   Injectable 40 milliGRAM(s) IV Push every 12 hours  influenza  Vaccine (HIGH DOSE) 0.5 milliLiter(s) IntraMuscular once  metoprolol succinate ER 25 milliGRAM(s) Oral daily  nystatin Powder 1 Application(s) Topical two times a day  pantoprazole  Injectable 40 milliGRAM(s) IV Push every 12 hours  sodium bicarbonate 650 milliGRAM(s) Oral two times a day  tamsulosin 0.4 milliGRAM(s) Oral at bedtime    MEDICATIONS  (PRN):  acetaminophen     Tablet .. 650 milliGRAM(s) Oral every 6 hours PRN Temp greater or equal to 38C (100.4F), Mild Pain (1 - 3)  aluminum hydroxide/magnesium hydroxide/simethicone Suspension 30 milliLiter(s) Oral every 4 hours PRN Dyspepsia  melatonin 3 milliGRAM(s) Oral at bedtime PRN Insomnia  ondansetron Injectable 4 milliGRAM(s) IV Push every 8 hours PRN Nausea and/or Vomiting    Vital Signs Last 24 Hrs  T(F): 97.3 (27 Mar 2025 11:05), Max: 99.1 (26 Mar 2025 22:06)  HR: 57 (27 Mar 2025 11:05) (57 - 70)  BP: 141/64 (27 Mar 2025 11:05) (103/76 - 166/77)  RR: 18 (27 Mar 2025 11:05) (16 - 20)  SpO2: 100% (27 Mar 2025 11:05) (93% - 100%)  I&O's Summary    26 Mar 2025 07:01  -  27 Mar 2025 07:00  --------------------------------------------------------  IN: 0 mL / OUT: 2345 mL / NET: -2345 mL        PHYSICAL EXAM:  General: awake   ENT: left nostril Rhino rocket in place   Neck: supple   Lungs: CTA   Cardio: RRR, S1/S2, No murmur  Abdomen: Soft, Nontender, Nondistended; Bowel sounds present  Extremities: No calf tenderness, cronic venous stasis changes   Skin R  lower abd and groin rash moist rec skin     LABS:                        8.7    8.37  )-----------( 123      ( 27 Mar 2025 10:58 )             27.1     03-27    148  |  113  |  79.0  ----------------------------<  101  3.4   |  20.0  |  3.60    Ca    7.7      27 Mar 2025 04:48  Mg     2.4     03-27    TPro  6.0  /  Alb  3.3  /  TBili  0.9  /  DBili  x   /  AST  11  /  ALT  9   /  AlkPhos  97  03-27          PT/INR - ( 27 Mar 2025 04:01 )   PT: 29.8 sec;   INR: 2.59 ratio         PTT - ( 27 Mar 2025 04:01 )  PTT:33.4 sec          03-27 Chol 68 mg/dL LDL -- HDL 25 mg/dL Trig 79 mg/dL                  Urinalysis Basic - ( 27 Mar 2025 04:48 )    Color: x / Appearance: x / SG: x / pH: x  Gluc: 101 mg/dL / Ketone: x  / Bili: x / Urobili: x   Blood: x / Protein: x / Nitrite: x   Leuk Esterase: x / RBC: x / WBC x   Sq Epi: x / Non Sq Epi: x / Bacteria: x          RADIOLOGY & ADDITIONAL TESTS:  c< from: CT Head No Cont (04.17.24 @ 13:00) >  IMPRESSION:    1.  No evidence of acute intracranial hemorrhage or midline shift.  2.  Chronic ischemic changes as discussed above.  3.  Partially visualized attenuating lesion in the left parotid gland.   Recommend parotid ultrasound for further evaluation.    --- End of Report ---              < end of copied text >

## 2025-03-27 NOTE — CHART NOTE - NSCHARTNOTEFT_GEN_A_CORE
Pt had Large Bloody BM during Day shift, MD aware  GI consult in AM  PPI BID  NPO except meds in case need to be scoped  1 unit PRBCs  Pt has had 3 additional small BMs with Blood in it  Pt asymptomatic  VSS B/P 144/66 P 61  B/P stable  H/H stable  CBC ordered  Continue to monitor Pt had Large Bloody BM during Day shift, MD aware  GI consult in AM  PPI BID  NPO except meds in case need to be scoped  1 unit PRBCs  Pt has had 3 additional small BMs with Blood in it  Pt asymptomatic  VSS B/P 144/66 P 61  B/P stable  H/H stable  serial CBCs ordered  Continue to monitor

## 2025-03-27 NOTE — PROVIDER CONTACT NOTE (OTHER) - ASSESSMENT
VSS. Pt alert and no s/s of distress noted.
VSS. Pt alert, no s/s of distress noted. Pt currently receiving 1 unit PRBC transfusion.
Pt asymptomatic, but noted to be pale. BP stable but trending down.
in BR BP was 103/76. Once pt was safely back in bed BP was 155/79 R arm, 161/74 L arm, HR 70, O2 98, temp 97.9 orally.

## 2025-03-27 NOTE — PROGRESS NOTE ADULT - ASSESSMENT
74M with PMHx of Paroxysmal Afib (on Eliquis) HFrEF, CKD3b, HTN, HLD, BPH presented to [facility] with epistaxis and cough. +acute on chronic HF with L sided epistaxis now controlled since placement of L rapid rhino 3/26.

## 2025-03-27 NOTE — CONSULT NOTE ADULT - SUBJECTIVE AND OBJECTIVE BOX
HPI: The pt is a 73 yo male (sees Dr Heard in our office) PMH +CKD(IIIb) who presents to the ED with epistaxis and cough.  Pt also noted to have dyspnea and poorly controlled HTN.  We are asked to evaluate regarding his renal disease.  Pt states his dyspnea has improved; he does note loose, bloody BMs overnight.      PAST MEDICAL & SURGICAL HISTORY:  Hypertension    CKD (chronic kidney disease)    Afib    HLD (hyperlipidemia)    CABG ('2008)    TAVR    CM s/p ICD      FAMILY HISTORY:  FHx: heart disease (Father)        Social History:  No current tobacco; no etoh nor drug abuse      MEDICATIONS  (STANDING):  allopurinol 100 milliGRAM(s) Oral daily  aMIOdarone    Tablet 100 milliGRAM(s) Oral daily  atorvastatin 20 milliGRAM(s) Oral at bedtime  folic acid 1 milliGRAM(s) Oral daily  furosemide   Injectable 40 milliGRAM(s) IV Push every 12 hours  influenza  Vaccine (HIGH DOSE) 0.5 milliLiter(s) IntraMuscular once  metoprolol succinate ER 25 milliGRAM(s) Oral daily  pantoprazole  Injectable 40 milliGRAM(s) IV Push every 12 hours  potassium chloride  10 mEq/100 mL IVPB 10 milliEquivalent(s) IV Intermittent every 1 hour  sodium bicarbonate 650 milliGRAM(s) Oral two times a day  tamsulosin 0.4 milliGRAM(s) Oral at bedtime    MEDICATIONS  (PRN):  acetaminophen     Tablet .. 650 milliGRAM(s) Oral every 6 hours PRN Temp greater or equal to 38C (100.4F), Mild Pain (1 - 3)  aluminum hydroxide/magnesium hydroxide/simethicone Suspension 30 milliLiter(s) Oral every 4 hours PRN Dyspepsia  melatonin 3 milliGRAM(s) Oral at bedtime PRN Insomnia  ondansetron Injectable 4 milliGRAM(s) IV Push every 8 hours PRN Nausea and/or Vomiting      Allergies    No Known Allergies    Intolerances        REVIEW OF SYSTEMS:  CONSTITUTIONAL: No fever, weight loss, + fatigue  EYES: No eye pain, visual disturbances, or discharge  ENMT:  No difficulty hearing, tinnitus, vertigo; No sinus or throat pain  NECK: No pain or stiffness  BREASTS: No pain, masses, or nipple discharge  RESPIRATORY: No cough, wheezing, chills or hemoptysis; + shortness of breath  CARDIOVASCULAR: No chest pain, palpitations, dizziness, +leg swelling  GASTROINTESTINAL: No abdominal or epigastric pain. No nausea, vomiting, or hematemesis;   + melena   GENITOURINARY: No dysuria, frequency, hematuria, or incontinence  NEUROLOGICAL: No headaches, memory loss, loss of strength, numbness, or tremors  SKIN: No itching, burning, rashes, or lesions   MUSCULOSKELETAL: No joint pain or swelling; No muscle, back, or extremity pain  PSYCHIATRIC: No depression, anxiety, mood swings, or difficulty sleeping        Vital Signs Last 24 Hrs  T(C): 36.9 (27 Mar 2025 07:06), Max: 37.3 (26 Mar 2025 22:06)  T(F): 98.4 (27 Mar 2025 07:06), Max: 99.1 (26 Mar 2025 22:06)  HR: 59 (27 Mar 2025 07:06) (56 - 70)  BP: 122/52 (27 Mar 2025 07:06) (103/76 - 189/92)  BP(mean): 103 (26 Mar 2025 19:58) (85 - 107)  RR: 18 (27 Mar 2025 07:06) (16 - 20)  SpO2: 98% (27 Mar 2025 07:06) (93% - 100%)    Parameters below as of 27 Mar 2025 07:06  Patient On (Oxygen Delivery Method): room air        PHYSICAL EXAM:  GENERAL: Fatigued, weak  HEAD: L nasal tampon  EYES: Conjunctiva and sclera clear  ENMT: Moist mucous membranes, Good dentition, No lesions  NECK: Supple, No JVD  NERVOUS SYSTEM:  Alert & Oriented X3, intact and symmetric  CHEST/LUNG: Diminished B/L BS, rales  HEART: No rub  ABDOMEN: Soft, Nontender, +BS  EXTREMITIES:  2+ Peripheral Pulses, + dependent edema  SKIN: No rashes or lesions      LABS:                        9.3    9.10  )-----------( 150      ( 27 Mar 2025 06:38 )             29.2     03-27    148[H]  |  113[H]  |  79.0[H]  ----------------------------<  101[H]  3.4[L]   |  20.0[L]  |  3.60[H]      Creatinine: 3.55 mg/dL (03.26.25)    Ca    7.7[L]      27 Mar 2025 04:48  Mg     2.4     03-27    TPro  6.0[L]  /  Alb  3.3  /  TBili  0.9  /  DBili  x   /  AST  11  /  ALT  9   /  AlkPhos  97  03-27    PT/INR - ( 27 Mar 2025 04:01 )   PT: 29.8 sec;   INR: 2.59 ratio         PTT - ( 27 Mar 2025 04:01 )  PTT:33.4 sec  Urinalysis Basic - ( 27 Mar 2025 04:48 )    Color: x / Appearance: x / SG: x / pH: x  Gluc: 101 mg/dL / Ketone: x  / Bili: x / Urobili: x   Blood: x / Protein: x / Nitrite: x   Leuk Esterase: x / RBC: x / WBC x   Sq Epi: x / Non Sq Epi: x / Bacteria: x      Magnesium: 2.4 mg/dL (03-27 @ 04:48)  Magnesium: 2.3 mg/dL (03-26 @ 15:51)      RADIOLOGY & ADDITIONAL TESTS:  < from: Xray Chest 2 Views PA/Lat (03.26.25 @ 09:52) >  PROCEDURE DATE:  03/26/2025          INTERPRETATION:  EXAM: XR CHEST PA AND LATERAL    INDICATION: cough, sob XXR    COMPARISON: April 17, 2024    IMPRESSION: Slight prominence to parahilar interstitial markings in the   patient with significant cardiomegaly. Pacer and sternotomy as before.   Aortic valve stent graft as before. One should consider some fluid   overload. Regional osseous structures appropriate for age    < end of copied text >

## 2025-03-27 NOTE — PROGRESS NOTE ADULT - ASSESSMENT
75 y/o M w/ a hx of HFrEF, Paroxysmal Afib on AC, CKD3b, HTN, HLD, BPH here for mgmt of Acute On Chronic HFrEF and Epistaxis    1-Acute On Chronic HFrEF  cont diuretics lasix 40 mg iv bid   cardiology input appreciated       2-Paroxysmal Atrial Fibrillation on AC  cont tele   Eliquis on hold due to epistaxis and high INR   cont amiodarone 100mg Q24    3-Epistaxis  - controlled in ED  - HOLD AC as above  - ENT called , s/p rocket   AC on hold     4- Anemia blood loss   rectal bleed ? BM bloody x2 since yesterday   cont to hold eliquis   GI consulted in am  monitor HB   s/p PRBC overnight x1     5-ANGEL on CKD3b  cont bicarb  - lasix iv   - monitor cr daily  - caution w/ nephrotoxic agents    6-BPH  - flomax    7- Skin rash abdominal   probably fungal infection   add nystatin powder     DC in few days likely home vs [facility]     lives alone

## 2025-03-27 NOTE — CONSULT NOTE ADULT - NS ATTEND AMEND GEN_ALL_CORE FT
I agree with assessment and plan as above. Pt presented with severe epistaxis. Melena is expected in cases of epistaxis when significant amount of blood is swallowed. Patient reports feeling blood in the back of his throat and swallowing it during epistaxis. The chance of simultaneous upper GI bleed is low. There is no indication for EGD in this setting. Follow up ENT. GI signing off.

## 2025-03-27 NOTE — PROGRESS NOTE ADULT - PROBLEM SELECTOR PLAN 1
- Plan to keep packing in place x5 days, likely to be removed 3/31 in house vs. office pending hospital course  - Strict blood pressure control.  - Gram-positive abx coverage for duration of packing placement  - Nasal saline, 2 sprays to both nares 4 times a day  - Avoid nasal trauma; no nose rubbing, blowing or manipulating nasal packing.  Sneeze with mouth open and pinching nares.  - Avoid bending with head blow the waist.    -No heavy lifting.  -OK to continue AC/AP since epistaxis controlled

## 2025-03-28 LAB
24R-OH-CALCIDIOL SERPL-MCNC: 28.2 NG/ML — SIGNIFICANT CHANGE UP
ANION GAP SERPL CALC-SCNC: 18 MMOL/L — HIGH (ref 5–17)
BUN SERPL-MCNC: 77.3 MG/DL — HIGH (ref 8–20)
CALCIUM SERPL-MCNC: 8.3 MG/DL — LOW (ref 8.4–10.5)
CALCIUM SERPL-MCNC: 8.5 MG/DL — SIGNIFICANT CHANGE UP (ref 8.4–10.5)
CHLORIDE SERPL-SCNC: 116 MMOL/L — HIGH (ref 96–108)
CO2 SERPL-SCNC: 20 MMOL/L — LOW (ref 22–29)
CREAT SERPL-MCNC: 3.89 MG/DL — HIGH (ref 0.5–1.3)
EGFR: 16 ML/MIN/1.73M2 — LOW
EGFR: 16 ML/MIN/1.73M2 — LOW
FERRITIN SERPL-MCNC: 440 NG/ML — HIGH (ref 30–400)
GLUCOSE SERPL-MCNC: 90 MG/DL — SIGNIFICANT CHANGE UP (ref 70–99)
HCT VFR BLD CALC: 25.1 % — LOW (ref 39–50)
HCT VFR BLD CALC: 25.4 % — LOW (ref 39–50)
HGB BLD-MCNC: 8.2 G/DL — LOW (ref 13–17)
HGB BLD-MCNC: 8.3 G/DL — LOW (ref 13–17)
IRON SATN MFR SERPL: 13 % — LOW (ref 16–55)
IRON SATN MFR SERPL: 29 UG/DL — LOW (ref 59–158)
MAGNESIUM SERPL-MCNC: 2.6 MG/DL — SIGNIFICANT CHANGE UP (ref 1.6–2.6)
MCHC RBC-ENTMCNC: 32.7 G/DL — SIGNIFICANT CHANGE UP (ref 32–36)
MCHC RBC-ENTMCNC: 32.7 G/DL — SIGNIFICANT CHANGE UP (ref 32–36)
MCHC RBC-ENTMCNC: 33.3 PG — SIGNIFICANT CHANGE UP (ref 27–34)
MCHC RBC-ENTMCNC: 33.3 PG — SIGNIFICANT CHANGE UP (ref 27–34)
MCV RBC AUTO: 102 FL — HIGH (ref 80–100)
MCV RBC AUTO: 102 FL — HIGH (ref 80–100)
NRBC # BLD AUTO: 0 K/UL — SIGNIFICANT CHANGE UP (ref 0–0)
NRBC # BLD AUTO: 0 K/UL — SIGNIFICANT CHANGE UP (ref 0–0)
NRBC # FLD: 0 K/UL — SIGNIFICANT CHANGE UP (ref 0–0)
NRBC # FLD: 0 K/UL — SIGNIFICANT CHANGE UP (ref 0–0)
NRBC BLD AUTO-RTO: 0 /100 WBCS — SIGNIFICANT CHANGE UP (ref 0–0)
NRBC BLD AUTO-RTO: 0 /100 WBCS — SIGNIFICANT CHANGE UP (ref 0–0)
PHOSPHATE SERPL-MCNC: 3.3 MG/DL — SIGNIFICANT CHANGE UP (ref 2.4–4.7)
PLATELET # BLD AUTO: 128 K/UL — LOW (ref 150–400)
PLATELET # BLD AUTO: 140 K/UL — LOW (ref 150–400)
PMV BLD: 10.8 FL — SIGNIFICANT CHANGE UP (ref 7–13)
PMV BLD: 11.6 FL — SIGNIFICANT CHANGE UP (ref 7–13)
POTASSIUM SERPL-MCNC: 3.7 MMOL/L — SIGNIFICANT CHANGE UP (ref 3.5–5.3)
POTASSIUM SERPL-SCNC: 3.7 MMOL/L — SIGNIFICANT CHANGE UP (ref 3.5–5.3)
PTH-INTACT FLD-MCNC: 244 PG/ML — HIGH (ref 15–65)
RBC # BLD: 2.46 M/UL — LOW (ref 4.2–5.8)
RBC # BLD: 2.49 M/UL — LOW (ref 4.2–5.8)
RBC # FLD: 15.1 % — HIGH (ref 10.3–14.5)
RBC # FLD: 15.3 % — HIGH (ref 10.3–14.5)
SODIUM SERPL-SCNC: 153 MMOL/L — HIGH (ref 135–145)
TIBC SERPL-MCNC: 230 UG/DL — SIGNIFICANT CHANGE UP (ref 220–430)
TRANSFERRIN SERPL-MCNC: 161 MG/DL — LOW (ref 180–329)
WBC # BLD: 6.69 K/UL — SIGNIFICANT CHANGE UP (ref 3.8–10.5)
WBC # BLD: 7.61 K/UL — SIGNIFICANT CHANGE UP (ref 3.8–10.5)
WBC # FLD AUTO: 6.69 K/UL — SIGNIFICANT CHANGE UP (ref 3.8–10.5)
WBC # FLD AUTO: 7.61 K/UL — SIGNIFICANT CHANGE UP (ref 3.8–10.5)

## 2025-03-28 PROCEDURE — 99233 SBSQ HOSP IP/OBS HIGH 50: CPT

## 2025-03-28 PROCEDURE — 99231 SBSQ HOSP IP/OBS SF/LOW 25: CPT

## 2025-03-28 PROCEDURE — 76775 US EXAM ABDO BACK WALL LIM: CPT | Mod: 26

## 2025-03-28 RX ORDER — EPOETIN ALFA 10000 [IU]/ML
20000 SOLUTION INTRAVENOUS; SUBCUTANEOUS
Refills: 0 | Status: DISCONTINUED | OUTPATIENT
Start: 2025-03-28 | End: 2025-04-04

## 2025-03-28 RX ORDER — ERGOCALCIFEROL 1.25 MG/1
50000 CAPSULE ORAL
Refills: 0 | Status: DISCONTINUED | OUTPATIENT
Start: 2025-03-28 | End: 2025-04-04

## 2025-03-28 RX ADMIN — AMIODARONE HYDROCHLORIDE 100 MILLIGRAM(S): 50 INJECTION, SOLUTION INTRAVENOUS at 05:21

## 2025-03-28 RX ADMIN — CYANOCOBALAMIN 1000 MICROGRAM(S): 1000 INJECTION INTRAMUSCULAR; SUBCUTANEOUS at 11:38

## 2025-03-28 RX ADMIN — Medication 40 MILLIGRAM(S): at 05:20

## 2025-03-28 RX ADMIN — Medication 40 MILLIGRAM(S): at 17:21

## 2025-03-28 RX ADMIN — TAMSULOSIN HYDROCHLORIDE 0.4 MILLIGRAM(S): 0.4 CAPSULE ORAL at 21:11

## 2025-03-28 RX ADMIN — Medication 650 MILLIGRAM(S): at 05:21

## 2025-03-28 RX ADMIN — FOLIC ACID 1 MILLIGRAM(S): 1 TABLET ORAL at 11:38

## 2025-03-28 RX ADMIN — Medication 100 MILLIGRAM(S): at 11:38

## 2025-03-28 RX ADMIN — ATORVASTATIN CALCIUM 20 MILLIGRAM(S): 80 TABLET, FILM COATED ORAL at 21:11

## 2025-03-28 RX ADMIN — NYSTATIN 1 APPLICATION(S): 100000 CREAM TOPICAL at 05:21

## 2025-03-28 RX ADMIN — METOPROLOL SUCCINATE 25 MILLIGRAM(S): 50 TABLET, EXTENDED RELEASE ORAL at 05:21

## 2025-03-28 RX ADMIN — Medication 650 MILLIGRAM(S): at 17:21

## 2025-03-28 RX ADMIN — FUROSEMIDE 40 MILLIGRAM(S): 10 INJECTION INTRAMUSCULAR; INTRAVENOUS at 05:20

## 2025-03-28 RX ADMIN — NYSTATIN 1 APPLICATION(S): 100000 CREAM TOPICAL at 17:21

## 2025-03-28 NOTE — PROGRESS NOTE ADULT - SUBJECTIVE AND OBJECTIVE BOX
NEPHROLOGY INTERVAL HPI/OVERNIGHT EVENTS:    Feels better   Meds reviewed   No SOB or CP   UO 2.1 L     MEDICATIONS  (STANDING):  allopurinol 100 milliGRAM(s) Oral daily  aMIOdarone    Tablet 100 milliGRAM(s) Oral daily  atorvastatin 20 milliGRAM(s) Oral at bedtime  cyanocobalamin 1000 MICROGram(s) Oral daily  folic acid 1 milliGRAM(s) Oral daily  influenza  Vaccine (HIGH DOSE) 0.5 milliLiter(s) IntraMuscular once  metoprolol succinate ER 25 milliGRAM(s) Oral daily  nystatin Powder 1 Application(s) Topical two times a day  pantoprazole  Injectable 40 milliGRAM(s) IV Push every 12 hours  sodium bicarbonate 650 milliGRAM(s) Oral two times a day  tamsulosin 0.4 milliGRAM(s) Oral at bedtime    MEDICATIONS  (PRN):  acetaminophen     Tablet .. 650 milliGRAM(s) Oral every 6 hours PRN Temp greater or equal to 38C (100.4F), Mild Pain (1 - 3)  aluminum hydroxide/magnesium hydroxide/simethicone Suspension 30 milliLiter(s) Oral every 4 hours PRN Dyspepsia  melatonin 3 milliGRAM(s) Oral at bedtime PRN Insomnia  ondansetron Injectable 4 milliGRAM(s) IV Push every 8 hours PRN Nausea and/or Vomiting          No Known Allergies      Vital Signs Last 24 Hrs  T(C): 36.6 (28 Mar 2025 07:38), Max: 37.1 (27 Mar 2025 18:26)  T(F): 97.8 (28 Mar 2025 07:38), Max: 98.7 (27 Mar 2025 18:26)  HR: 61 (28 Mar 2025 07:38) (61 - 75)  BP: 108/58 (28 Mar 2025 07:38) (108/58 - 136/64)  BP(mean): 89 (27 Mar 2025 17:18) (88 - 89)  RR: 17 (28 Mar 2025 07:38) (17 - 19)  SpO2: 96% (28 Mar 2025 07:38) (95% - 99%)    Parameters below as of 28 Mar 2025 07:38  Patient On (Oxygen Delivery Method): room air      Daily     Daily   I&O's Detail    27 Mar 2025 07:01  -  28 Mar 2025 07:00  --------------------------------------------------------  IN:    Oral Fluid: 200 mL  Total IN: 200 mL    OUT:    Voided (mL): 2170 mL  Total OUT: 2170 mL    Total NET: -1970 mL        I&O's Summary    27 Mar 2025 07:01  -  28 Mar 2025 07:00  --------------------------------------------------------  IN: 200 mL / OUT: 2170 mL / NET: -1970 mL        PHYSICAL EXAM:  GENERAL: Fatigued, weak  HEAD: L nasal tampon  EYES: Conjunctiva and sclera clear  ENMT: Moist mucous membranes, Good dentition, No lesions  NECK: Supple, No JVD  NERVOUS SYSTEM:  Alert & Oriented X3, intact and symmetric  CHEST/LUNG: Diminished B/L BS, rales  HEART: No rub  ABDOMEN: Soft, Nontender, +BS  EXTREMITIES:  2+ Peripheral Pulses, + dependent edema  SKIN: No rashes or lesions    LABS:                        8.3    7.61  )-----------( 140      ( 28 Mar 2025 05:24 )             25.4     03-28    153[H]  |  116[H]  |  77.3[H]  ----------------------------<  90  3.7   |  20.0[L]  |  3.89[H]    Ca    8.3[L]      28 Mar 2025 05:24  Phos  3.3     03-28  Mg     2.6     03-28    TPro  6.0[L]  /  Alb  3.3  /  TBili  0.9  /  DBili  x   /  AST  11  /  ALT  9   /  AlkPhos  97  03-27    PT/INR - ( 27 Mar 2025 04:01 )   PT: 29.8 sec;   INR: 2.59 ratio         PTT - ( 27 Mar 2025 04:01 )  PTT:33.4 sec  Urinalysis Basic - ( 28 Mar 2025 05:24 )    Color: x / Appearance: x / SG: x / pH: x  Gluc: 90 mg/dL / Ketone: x  / Bili: x / Urobili: x   Blood: x / Protein: x / Nitrite: x   Leuk Esterase: x / RBC: x / WBC x   Sq Epi: x / Non Sq Epi: x / Bacteria: x      Intact PTH: 244 pg/mL (03-28 @ 05:29)  Vitamin D, 25-Hydroxy: 28.2 ng/mL (03-28 @ 05:29)  Phosphorus: 3.3 mg/dL (03-28 @ 05:29)  Magnesium: 2.6 mg/dL (03-28 @ 05:24)        < from: US Renal (03.28.25 @ 10:21) >  ACC: [id number] EXAM:  US KIDNEY(S)   ORDERED BY: HANNAH DIEHL     PROCEDURE DATE:  03/28/2025          INTERPRETATION:  CLINICAL INFORMATION: Renal failure    COMPARISON: None available.    TECHNIQUE: Sonography of the kidneys and bladder.    FINDINGS:  Technically difficult study due to body habitus.  Right kidney: 8.8 cm. No renal mass, hydronephrosis or calculi. Cyst    Left kidney: 9.5 cm. No renal mass, hydronephrosis or calculi. Cyst    Urinary bladder: Within normal limits.    IMPRESSION:  No hydronephrosis bilaterally.        --- End of Report ---            CHACHA RÍOS MD; Attending Radiologist  This docum    < end of copied text >    RADIOLOGY & ADDITIONAL TESTS:

## 2025-03-28 NOTE — DIETITIAN INITIAL EVALUATION ADULT - PERTINENT MEDS FT
MEDICATIONS  (STANDING):  allopurinol 100 milliGRAM(s) Oral daily  aMIOdarone    Tablet 100 milliGRAM(s) Oral daily  cyanocobalamin 1000 MICROGram(s) Oral daily  folic acid 1 milliGRAM(s) Oral daily  metoprolol succinate ER 25 milliGRAM(s) Oral daily  pantoprazole  Injectable 40 milliGRAM(s) IV Push every 12 hours  sodium bicarbonate 650 milliGRAM(s) Oral two times a day    MEDICATIONS  (PRN):  ondansetron Injectable 4 milliGRAM(s) IV Push every 8 hours PRN Nausea and/or Vomiting

## 2025-03-28 NOTE — PROGRESS NOTE ADULT - SUBJECTIVE AND OBJECTIVE BOX
ENT ISSUE/POD: L epistaxis s/p rapid rhino placement    HPI: Patient was seen and examined at bedside today, states that he is still having dark stools. Denies any nose bleeds since placement of rapid rhino.       PAST MEDICAL & SURGICAL HISTORY:  Hypertension      CKD (chronic kidney disease)      Afib      HLD (hyperlipidemia)      Presence of stent of CABG        Allergies    No Known Allergies    Intolerances      MEDICATIONS  (STANDING):  allopurinol 100 milliGRAM(s) Oral daily  aMIOdarone    Tablet 100 milliGRAM(s) Oral daily  atorvastatin 20 milliGRAM(s) Oral at bedtime  cyanocobalamin 1000 MICROGram(s) Oral daily  epoetin esvin-epbx (RETACRIT) Injectable 39652 Unit(s) SubCutaneous every 7 days  ergocalciferol 27621 Unit(s) Oral every week  folic acid 1 milliGRAM(s) Oral daily  influenza  Vaccine (HIGH DOSE) 0.5 milliLiter(s) IntraMuscular once  metoprolol succinate ER 25 milliGRAM(s) Oral daily  nystatin Powder 1 Application(s) Topical two times a day  pantoprazole  Injectable 40 milliGRAM(s) IV Push every 12 hours  sodium bicarbonate 650 milliGRAM(s) Oral two times a day  tamsulosin 0.4 milliGRAM(s) Oral at bedtime    MEDICATIONS  (PRN):  acetaminophen     Tablet .. 650 milliGRAM(s) Oral every 6 hours PRN Temp greater or equal to 38C (100.4F), Mild Pain (1 - 3)  aluminum hydroxide/magnesium hydroxide/simethicone Suspension 30 milliLiter(s) Oral every 4 hours PRN Dyspepsia  melatonin 3 milliGRAM(s) Oral at bedtime PRN Insomnia  ondansetron Injectable 4 milliGRAM(s) IV Push every 8 hours PRN Nausea and/or Vomiting      Social History: see consult note    Family history: see consult note    ROS:   ENT: all negative except as noted in HPI   Pulm: denies SOB, cough, hemoptysis  Neuro: denies numbness/tingling, loss of sensation  Endo: denies heat/cold intolerance, excessive sweating      Vital Signs Last 24 Hrs  T(C): 36.6 (28 Mar 2025 07:38), Max: 37.1 (27 Mar 2025 18:26)  T(F): 97.8 (28 Mar 2025 07:38), Max: 98.7 (27 Mar 2025 18:26)  HR: 61 (28 Mar 2025 07:38) (61 - 75)  BP: 108/58 (28 Mar 2025 07:38) (108/58 - 136/64)  BP(mean): 89 (27 Mar 2025 17:18) (88 - 89)  RR: 17 (28 Mar 2025 07:38) (17 - 19)  SpO2: 96% (28 Mar 2025 07:38) (95% - 99%)    Parameters below as of 28 Mar 2025 07:38  Patient On (Oxygen Delivery Method): room air                              8.3    7.61  )-----------( 140      ( 28 Mar 2025 05:24 )             25.4    03-28    153[H]  |  116[H]  |  77.3[H]  ----------------------------<  90  3.7   |  20.0[L]  |  3.89[H]    Ca    8.3[L]      28 Mar 2025 05:24  Phos  3.3     03-28  Mg     2.6     03-28    TPro  6.0[L]  /  Alb  3.3  /  TBili  0.9  /  DBili  x   /  AST  11  /  ALT  9   /  AlkPhos  97  03-27  PT/INR - ( 27 Mar 2025 04:01 )   PT: 29.8 sec;   INR: 2.59 ratio     PTT - ( 27 Mar 2025 04:01 )  PTT:33.4 sec    PHYSICAL EXAM:  Gen: NAD  Skin: No rashes, bruises, or lesions  Head: Normocephalic, Atraumatic  Face: no edema, erythema, or fluctuance  Nose: L rapid rhino in place, no bleeding form b/l nares  Mouth: No Stridor / Drooling / Trismus.  Mucosa moist, tongue/uvula midline, oropharynx clear  Neck: Flat, supple, no lymphadenopathy, trachea midline, no masses  Resp: breathing easily, no stridor  Neuro: facial nerve intact, no facial droop

## 2025-03-28 NOTE — PROGRESS NOTE ADULT - SUBJECTIVE AND OBJECTIVE BOX
[facility]                                      [facility]                              [address] [place] [address]                                                 PHONE: [phone]                                                 FAX: [phone]  -----------------------------------------------------------------------------------------------  Pt seen and examined. FU for AF    Overnight events/Complaints:     Pt with no further nose bleed   Comfortable  No SOB or CP  Awaiting US    RELEVANT PHYSICAL EXAM:  Neck: No obvious JVD  Cardiovascular: regular S1, S2  Respiratory: Lungs clear to auscultation; no crepitations, no wheeze  Musculoskeletal: No edema    Vital Signs Last 24 Hrs  T(C): 36.9 (27 Mar 2025 07:06), Max: 37.3 (26 Mar 2025 22:06)  T(F): 98.4 (27 Mar 2025 07:06), Max: 99.1 (26 Mar 2025 22:06)  HR: 59 (27 Mar 2025 07:06) (56 - 70)  BP: 122/52 (27 Mar 2025 07:06) (103/76 - 166/77)  BP(mean): 103 (26 Mar 2025 19:58) (85 - 107)  RR: 18 (27 Mar 2025 07:06) (16 - 20)  SpO2: 98% (27 Mar 2025 07:06) (93% - 100%)    Parameters below as of 27 Mar 2025 07:06  Patient On (Oxygen Delivery Method): room air      I&O's Summary    26 Mar 2025 07:01  -  27 Mar 2025 07:00  --------------------------------------------------------  IN: 0 mL / OUT: 2345 mL / NET: -2345 mL            LABS:                        9.3    9.10  )-----------( 150      ( 27 Mar 2025 06:38 )             29.2    03-27    148[H]  |  113[H]  |  79.0[H]  ----------------------------<  101[H]  3.4[L]   |  20.0[L]  |  3.60[H]    Ca    7.7[L]      27 Mar 2025 04:48  Mg     2.4     03-27    TPro  6.0[L]  /  Alb  3.3  /  TBili  0.9  /  DBili  x   /  AST  11  /  ALT  9   /  AlkPhos  97  03-27     PT/INR - ( 27 Mar 2025 04:01 )   PT: 29.8 sec;   INR: 2.59 ratio         PTT - ( 27 Mar 2025 04:01 )  PTT:33.4 sec    RADIOLOGY & ADDITIONAL STUDIES: (reviewed)  CXR was independently visualized/reviewed  and demonstrated:  Slight prominence to parahilar interstitial markings in the   patient with significant cardiomegaly. Pacer and sternotomy as before.   Aortic valve stent graft as before. One should consider some fluid   overload. Regional osseous structures appropriate for age    CARDIOLOGY TESTING:(reviewed)     TELEMETRY independently visualized/reviewed and demonstrated : NSR    12 lead EKG independently visualized/reviewed  and demonstrated     ECHO: 8/2024: LVEF 50-55%, mild MR, normal TAVR gradients    Echo:   1. Left ventricular cavity is normal in size. Left ventricular wall thickness is normal. Left ventricular systolic function is severely decreased with an ejection fraction visually estimated at 25 to 30 %.   2. The left ventricular diastolic function is indeterminate.   3. Moderately enlarged right ventricular cavity size and moderately to severely reduced right ventricular systolic function.   4. Left atrium is moderately dilated.   5. The right atrium is moderately dilated.   6. 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 No intravalvular or paravalvular regurgitation.   7. The peak transaortic velocity is 1.92 m/s, peak transaortic gradient is 14.7 mmHg and mean transaortic gradient is 7.0 mmHg with an LVOT/aortic valve VTI ratio of 0.40. The aortic valve acceleration time is 74 msec.   8. Mitral valve leaflets are diffusely calcified.   9. There is mild posterior calcification of the mitral valve annulus.  10. Moderate mitral regurgitation.  11. Moderate to severe tricuspid regurgitation.  12. Estimated pulmonary artery systolic pressure is 46 mmHg, mild pulmonary hypertension.  13. No pericardial effusion seen.    STRESS TEST: office 7/2024 PET: moderate anteroapical ischemia EF 45%      MEDICATIONS  (STANDING):  allopurinol 100 milliGRAM(s) Oral daily  aMIOdarone    Tablet 100 milliGRAM(s) Oral daily  atorvastatin 20 milliGRAM(s) Oral at bedtime  cyanocobalamin 1000 MICROGram(s) Oral daily  folic acid 1 milliGRAM(s) Oral daily  influenza  Vaccine (HIGH DOSE) 0.5 milliLiter(s) IntraMuscular once  metoprolol succinate ER 25 milliGRAM(s) Oral daily  nystatin Powder 1 Application(s) Topical two times a day  pantoprazole  Injectable 40 milliGRAM(s) IV Push every 12 hours  sodium bicarbonate 650 milliGRAM(s) Oral two times a day  tamsulosin 0.4 milliGRAM(s) Oral at bedtime    MEDICATIONS  (PRN):  acetaminophen     Tablet .. 650 milliGRAM(s) Oral every 6 hours PRN Temp greater or equal to 38C (100.4F), Mild Pain (1 - 3)  aluminum hydroxide/magnesium hydroxide/simethicone Suspension 30 milliLiter(s) Oral every 4 hours PRN Dyspepsia  melatonin 3 milliGRAM(s) Oral at bedtime PRN Insomnia  ondansetron Injectable 4 milliGRAM(s) IV Push every 8 hours PRN Nausea and/or Vomiting        ASSESSMENT AND PLAN:    74y Male with HTN HLD CKD baseline creat 3.6 CAD s/p CABG X 4 2008, initial LV dysfunction s/p ICD , post CABG PCI (RCA 2020, M 2021) s/p TAVR 2021 PAF s/p DCCV 2022, BIV ICD upgrade 2023 augmented EF 55% by echo 8/2024, last seen in ofice 9/2024 clinically well compensated on GDMT.   Came to Er c/o cough/epistaxis and dyspnea. Pt reports several days of increasing SANDERS orthopnea and awakened by nonproductive cough.  In addition to epistaxis pt reports bloody bowel movement earlier today, chronic gait instability.  No evidence of chest pain    Shortness of breath     - Resolved  - related to anemia and CMP  - Will start Demadex 20 mg po daily  - Continue toprol  - Defer ACE-I/ARB/ARNI given CRI    PAF  - Dc eliquis - pt not candidate - will consider outpt eval for LAAO    ICMP  - Echo with severe LV dysfunction and decreased LVEF  - Needs ischemic evaluation with cath given prior CAD but limited at present time due to advanced CKD   - Nephro optimization    CAD  - Continue ASA    HLD/ dyslipidemia  - Continue statin

## 2025-03-28 NOTE — PROGRESS NOTE ADULT - ASSESSMENT
CKD(IIIb): Screat 2.7mg/dL in recent past  Now ANGEL in setting of decompensated CHF/diuretics and poor BP control vs progression of disease vs both  - avoid potential nephrotoxins  - adjust BP meds as needed to gradually optimize   - diuretics as needed and accept azotemia   - cont as per cardiology  - renal sono w/o hydro 3/28   - Resp status improved ---> Lasix held       HTN - Watch on meds     Anemia: +CKD + GI loss  - check Fe stores  - add KIARA once BP controlled and stable  - PRBCs as needed  - GI eval    RO:  -  Vit D 28 and iPTH 244  - Add weekly ergo CKD(IIIb): Screat 2.7mg/dL in recent past  Now ANGEL in setting of decompensated CHF/diuretics and poor BP control vs progression of disease vs both  - avoid potential nephrotoxins  - adjust BP meds as needed to gradually optimize   - diuretics as needed and accept azotemia   - cont as per cardiology  - renal sono w/o hydro 3/28   - Resp status improved ---> Lasix held       HTN - Watch on meds     Anemia: +CKD + GI loss  - Iron stores OK   - add Retacrit and folate   - PRBCs as needed  - GI eval    RO:  -  Vit D 28 and iPTH 244  - Add weekly ergo

## 2025-03-28 NOTE — PROGRESS NOTE ADULT - ASSESSMENT
73 y/o M w/ a hx of HFrEF, Paroxysmal Afib on AC, CKD3b, HTN, HLD, BPH here for mgmt of Acute On Chronic HFrEF and Epistaxis    1-Acute On Chronic HFrEF  cont diuretics lasix 40 mg iv bid   cardiology input appreciated       2-Paroxysmal Atrial Fibrillation on AC  cont tele   Eliquis on hold due to epistaxis and high INR   cont amiodarone 100mg Q24    3-Epistaxis  - controlled in ED  - HOLD AC as above  - ENT called , s/p rocket   AC on hold     4- Anemia blood loss   rectal bleed ? BM bloody x2 since yesterday   cont to hold eliquis   GI consulted in am  monitor HB   s/p PRBC overnight x1     5-ANGEL on CKD3b  cont bicarb  - lasix iv   - monitor cr daily  - caution w/ nephrotoxic agents    6-BPH  - flomax    7- Skin rash abdominal   probably fungal infection   add nystatin powder     DC in few days likely home vs [facility]     lives alone    75 y/o M w/ a hx of HFrEF, Paroxysmal Afib on AC, CKD3b, HTN, HLD, BPH here for mgmt of Acute On Chronic HFrEF and Epistaxis    1-Acute On Chronic HFrEF  DC lasix   resume demadex in 24 hours if NA and cr improves   cardiology input appreciated     2-Paroxysmal Atrial Fibrillation on AC  Dc eliquis per cardio   outpatient follow up for watchman   cont amiodarone 100mg Q24    3-EpistaxisED  - HOLD AC as above  - ENT consulted s/p rocket in left nares     4- Anemia blood loss   due to epistaxis   melena likely due to above   GI consulted no plan for EGD   monitor HB stable   s/p PRBC 3/26 overnight     5-ANGEL on CKD3b  hold diuretics   DC diuretics today   - caution w/ nephrotoxic agents  BMP in am     6-BPH  - flomax    7- Skin rash fungal skin infection   cont nystatin powder     DC in few days likely home     lives alone

## 2025-03-28 NOTE — DIETITIAN INITIAL EVALUATION ADULT - OTHER INFO
Per chart "73 y/o M w/ a hx of HFrEF, Paroxysmal Afib on AC, CKD3b, HTN, HLD, BPH here for mgmt of Acute On Chronic HFrEF and Epistaxis."

## 2025-03-28 NOTE — PROGRESS NOTE ADULT - PROBLEM SELECTOR PLAN 1
- Plan to keep packing in place x5 days, likely to be removed 3/31 in house vs. in office, pending hospital course; if patient is discharged over the weekend will set up appointment with Dr. Lee on Monday in office  - Strict blood pressure control  - Recommend gram-positive abx coverage for duration of packing placement  - Nasal saline, 2 sprays to both nares at least 4 times a day  - Avoid nasal trauma - no nose rubbing, blowing, or manipulating nasal packing. Sneeze with mouth open and pinching nares.  - Avoid bending with head blow the waist   -No heavy lifting.  -OK to continue AC/AP since epistaxis controlled. - Plan to keep packing in place x5 days, likely to be removed 3/31 in house vs. in office, pending hospital course; if patient is discharged over the weekend will set up appointment with Dr. Lee on Monday in office at 10:45 am ([address] [facility])  - Strict blood pressure control  - Recommend gram-positive abx coverage for duration of packing placement  - Nasal saline, 2 sprays to both nares at least 4 times a day  - Avoid nasal trauma - no nose rubbing, blowing, or manipulating nasal packing. Sneeze with mouth open and pinching nares.  - Avoid bending with head blow the waist   -No heavy lifting.  -OK to continue AC/AP since epistaxis controlled. - Plan to keep packing in place x5 days, likely to be removed 3/31 in house vs. in office, pending hospital course; if patient is discharged over the weekend will set up appointment with Dr. Lee on Monday in office at 10:45 am ([address] [facility])  - Strict blood pressure control  - Recommend gram-positive abx coverage for duration of packing placement  - Nasal saline, 2 sprays to both nares at least 4 times a day  - Avoid nasal trauma - no nose rubbing, blowing, or manipulating nasal packing. Sneeze with mouth open and pinching nares.  - Avoid bending with head blow the waist   - No heavy lifting  - OK to continue AC/AP since epistaxis controlled

## 2025-03-28 NOTE — PROGRESS NOTE ADULT - ASSESSMENT
74M with PMHx of Paroxysmal Afib, HFrEF, CKD3b, HTN, HLD, BPH presented to [facility] with epistaxis and cough. Admitted for acute on chronic HF. ENT consulted for L sided epistaxis now controlled since placement of L rapid rhino 3/26.

## 2025-03-28 NOTE — DIETITIAN INITIAL EVALUATION ADULT - CONTINUE CURRENT NUTRITION CARE PLAN
[Yes] : Risk of tobacco use and health benefits of smoking cessation discussed: Yes [Cessation strategies including cessation program discussed] : Cessation strategies including cessation program discussed [Use of nicotine replacement therapies and other medications discussed] : Use of nicotine replacement therapies and other medications discussed [Encouraged to pick a quit date and identify support needed to quit] : Encouraged to pick a quit date and identify support needed to quit [Smoking Cessation Program Referral] : Smoking Cessation Program Referral  [FreeTextEntry1] : 4 yes

## 2025-03-28 NOTE — DIETITIAN INITIAL EVALUATION ADULT - ORAL INTAKE PTA/DIET HISTORY
Patient not in room at time of visit. Breakfast tray at bedside with 100% consumed. Pt appears to be tolerating diet well with good appetite/PO intake. No reported c/o N/V/C/D at this time, last BM 3/28 per flowsheets. Current weight 243 lbs, moderate edema noted. Heart failure diet education left at bedside with RD contact information provided for further nutrition-related questions or concerns. Subjective interview deferred to follow up. Will continue to monitor and follow up as needed. RD remains available.

## 2025-03-28 NOTE — PROGRESS NOTE ADULT - SUBJECTIVE AND OBJECTIVE BOX
Patient is a 74y old  Male who presents with a chief complaint of Acute On Chronic HFrEF (27 Mar 2025 14:07)      Patient seen and examined at bedside earlier   still having dark color soft BM   no nose bleed   .     ALLERGIES:  No Known Allergies    MEDICATIONS  (STANDING):  allopurinol 100 milliGRAM(s) Oral daily  aMIOdarone    Tablet 100 milliGRAM(s) Oral daily  atorvastatin 20 milliGRAM(s) Oral at bedtime  cyanocobalamin 1000 MICROGram(s) Oral daily  folic acid 1 milliGRAM(s) Oral daily  influenza  Vaccine (HIGH DOSE) 0.5 milliLiter(s) IntraMuscular once  metoprolol succinate ER 25 milliGRAM(s) Oral daily  nystatin Powder 1 Application(s) Topical two times a day  pantoprazole  Injectable 40 milliGRAM(s) IV Push every 12 hours  sodium bicarbonate 650 milliGRAM(s) Oral two times a day  tamsulosin 0.4 milliGRAM(s) Oral at bedtime    MEDICATIONS  (PRN):  acetaminophen     Tablet .. 650 milliGRAM(s) Oral every 6 hours PRN Temp greater or equal to 38C (100.4F), Mild Pain (1 - 3)  aluminum hydroxide/magnesium hydroxide/simethicone Suspension 30 milliLiter(s) Oral every 4 hours PRN Dyspepsia  melatonin 3 milliGRAM(s) Oral at bedtime PRN Insomnia  ondansetron Injectable 4 milliGRAM(s) IV Push every 8 hours PRN Nausea and/or Vomiting    Vital Signs Last 24 Hrs  T(F): 97.8 (28 Mar 2025 07:38), Max: 98.7 (27 Mar 2025 18:26)  HR: 61 (28 Mar 2025 07:38) (57 - 75)  BP: 108/58 (28 Mar 2025 07:38) (108/58 - 141/64)  RR: 17 (28 Mar 2025 07:38) (17 - 19)  SpO2: 96% (28 Mar 2025 07:38) (95% - 100%)  I&O's Summary    27 Mar 2025 07:01  -  28 Mar 2025 07:00  --------------------------------------------------------  IN: 200 mL / OUT: 2170 mL / NET: -1970 mL        PHYSICAL EXAM:  General:   ENT:   Neck:   Lungs:   Cardio: RRR, S1/S2, No murmur  Abdomen: Soft, Nontender, Nondistended; Bowel sounds present  Extremities: No calf tenderness, No pitting edema    LABS:                        8.3    7.61  )-----------( 140      ( 28 Mar 2025 05:24 )             25.4     03-28    153  |  116  |  77.3  ----------------------------<  90  3.7   |  20.0  |  3.89    Ca    8.3      28 Mar 2025 05:24  Phos  3.3     03-28  Mg     2.6     03-28    TPro  6.0  /  Alb  3.3  /  TBili  0.9  /  DBili  x   /  AST  11  /  ALT  9   /  AlkPhos  97  03-27          PT/INR - ( 27 Mar 2025 04:01 )   PT: 29.8 sec;   INR: 2.59 ratio         PTT - ( 27 Mar 2025 04:01 )  PTT:33.4 sec          03-27 Chol 68 mg/dL LDL -- HDL 25 mg/dL Trig 79 mg/dL                  Urinalysis Basic - ( 28 Mar 2025 05:24 )    Color: x / Appearance: x / SG: x / pH: x  Gluc: 90 mg/dL / Ketone: x  / Bili: x / Urobili: x   Blood: x / Protein: x / Nitrite: x   Leuk Esterase: x / RBC: x / WBC x   Sq Epi: x / Non Sq Epi: x / Bacteria: x          RADIOLOGY & ADDITIONAL TESTS:       Patient is a 74y old  Male who presents with a chief complaint of Acute On Chronic HFrEF (27 Mar 2025 14:07)      Patient seen and examined at bedside earlier   still having dark color soft BM   no nose bleed   .     ALLERGIES:  No Known Allergies    MEDICATIONS  (STANDING):  allopurinol 100 milliGRAM(s) Oral daily  aMIOdarone    Tablet 100 milliGRAM(s) Oral daily  atorvastatin 20 milliGRAM(s) Oral at bedtime  cyanocobalamin 1000 MICROGram(s) Oral daily  folic acid 1 milliGRAM(s) Oral daily  influenza  Vaccine (HIGH DOSE) 0.5 milliLiter(s) IntraMuscular once  metoprolol succinate ER 25 milliGRAM(s) Oral daily  nystatin Powder 1 Application(s) Topical two times a day  pantoprazole  Injectable 40 milliGRAM(s) IV Push every 12 hours  sodium bicarbonate 650 milliGRAM(s) Oral two times a day  tamsulosin 0.4 milliGRAM(s) Oral at bedtime    MEDICATIONS  (PRN):  acetaminophen     Tablet .. 650 milliGRAM(s) Oral every 6 hours PRN Temp greater or equal to 38C (100.4F), Mild Pain (1 - 3)  aluminum hydroxide/magnesium hydroxide/simethicone Suspension 30 milliLiter(s) Oral every 4 hours PRN Dyspepsia  melatonin 3 milliGRAM(s) Oral at bedtime PRN Insomnia  ondansetron Injectable 4 milliGRAM(s) IV Push every 8 hours PRN Nausea and/or Vomiting    Vital Signs Last 24 Hrs  T(F): 97.8 (28 Mar 2025 07:38), Max: 98.7 (27 Mar 2025 18:26)  HR: 61 (28 Mar 2025 07:38) (57 - 75)  BP: 108/58 (28 Mar 2025 07:38) (108/58 - 141/64)  RR: 17 (28 Mar 2025 07:38) (17 - 19)  SpO2: 96% (28 Mar 2025 07:38) (95% - 100%)  I&O's Summary    27 Mar 2025 07:01  -  28 Mar 2025 07:00  --------------------------------------------------------  IN: 200 mL / OUT: 2170 mL / NET: -1970 mL        PHYSICAL EXAM:  General: awake   ENT: nasal   Neck: supple   Lungs: CTA bilateral   Cardio: RRR, S1/S2, No murmur  Abdomen: Soft, Nontender, Nondistended; Bowel sounds present  Extremities: No calf tenderness, No pitting edema    LABS:                        8.3    7.61  )-----------( 140      ( 28 Mar 2025 05:24 )             25.4     03-28    153  |  116  |  77.3  ----------------------------<  90  3.7   |  20.0  |  3.89    Ca    8.3      28 Mar 2025 05:24  Phos  3.3     03-28  Mg     2.6     03-28    TPro  6.0  /  Alb  3.3  /  TBili  0.9  /  DBili  x   /  AST  11  /  ALT  9   /  AlkPhos  97  03-27          PT/INR - ( 27 Mar 2025 04:01 )   PT: 29.8 sec;   INR: 2.59 ratio         PTT - ( 27 Mar 2025 04:01 )  PTT:33.4 sec          03-27 Chol 68 mg/dL LDL -- HDL 25 mg/dL Trig 79 mg/dL                  Urinalysis Basic - ( 28 Mar 2025 05:24 )    Color: x / Appearance: x / SG: x / pH: x  Gluc: 90 mg/dL / Ketone: x  / Bili: x / Urobili: x   Blood: x / Protein: x / Nitrite: x   Leuk Esterase: x / RBC: x / WBC x   Sq Epi: x / Non Sq Epi: x / Bacteria: x          RADIOLOGY & ADDITIONAL TESTS:

## 2025-03-29 LAB
ANION GAP SERPL CALC-SCNC: 17 MMOL/L — SIGNIFICANT CHANGE UP (ref 5–17)
BUN SERPL-MCNC: 70.8 MG/DL — HIGH (ref 8–20)
CALCIUM SERPL-MCNC: 7.9 MG/DL — LOW (ref 8.4–10.5)
CHLORIDE SERPL-SCNC: 109 MMOL/L — HIGH (ref 96–108)
CO2 SERPL-SCNC: 20 MMOL/L — LOW (ref 22–29)
CREAT SERPL-MCNC: 4.42 MG/DL — HIGH (ref 0.5–1.3)
EGFR: 13 ML/MIN/1.73M2 — LOW
EGFR: 13 ML/MIN/1.73M2 — LOW
GLUCOSE SERPL-MCNC: 95 MG/DL — SIGNIFICANT CHANGE UP (ref 70–99)
HCT VFR BLD CALC: 24.2 % — LOW (ref 39–50)
HGB BLD-MCNC: 7.8 G/DL — LOW (ref 13–17)
MAGNESIUM SERPL-MCNC: 2.3 MG/DL — SIGNIFICANT CHANGE UP (ref 1.6–2.6)
MCHC RBC-ENTMCNC: 32.2 G/DL — SIGNIFICANT CHANGE UP (ref 32–36)
MCHC RBC-ENTMCNC: 33.2 PG — SIGNIFICANT CHANGE UP (ref 27–34)
MCV RBC AUTO: 103 FL — HIGH (ref 80–100)
NRBC # BLD AUTO: 0 K/UL — SIGNIFICANT CHANGE UP (ref 0–0)
NRBC # FLD: 0 K/UL — SIGNIFICANT CHANGE UP (ref 0–0)
NRBC BLD AUTO-RTO: 0 /100 WBCS — SIGNIFICANT CHANGE UP (ref 0–0)
PLATELET # BLD AUTO: 138 K/UL — LOW (ref 150–400)
PMV BLD: 11.1 FL — SIGNIFICANT CHANGE UP (ref 7–13)
POTASSIUM SERPL-MCNC: 3.1 MMOL/L — LOW (ref 3.5–5.3)
POTASSIUM SERPL-SCNC: 3.1 MMOL/L — LOW (ref 3.5–5.3)
RBC # BLD: 2.35 M/UL — LOW (ref 4.2–5.8)
RBC # FLD: 15.2 % — HIGH (ref 10.3–14.5)
SODIUM SERPL-SCNC: 146 MMOL/L — HIGH (ref 135–145)
WBC # BLD: 7.8 K/UL — SIGNIFICANT CHANGE UP (ref 3.8–10.5)
WBC # FLD AUTO: 7.8 K/UL — SIGNIFICANT CHANGE UP (ref 3.8–10.5)

## 2025-03-29 PROCEDURE — 99232 SBSQ HOSP IP/OBS MODERATE 35: CPT

## 2025-03-29 RX ORDER — AMOXICILLIN AND CLAVULANATE POTASSIUM 500; 125 MG/1; MG/1
1 TABLET, FILM COATED ORAL
Refills: 0 | Status: DISCONTINUED | OUTPATIENT
Start: 2025-03-29 | End: 2025-03-31

## 2025-03-29 RX ADMIN — TAMSULOSIN HYDROCHLORIDE 0.4 MILLIGRAM(S): 0.4 CAPSULE ORAL at 21:08

## 2025-03-29 RX ADMIN — NYSTATIN 1 APPLICATION(S): 100000 CREAM TOPICAL at 17:08

## 2025-03-29 RX ADMIN — Medication 40 MILLIGRAM(S): at 05:03

## 2025-03-29 RX ADMIN — METOPROLOL SUCCINATE 25 MILLIGRAM(S): 50 TABLET, EXTENDED RELEASE ORAL at 05:04

## 2025-03-29 RX ADMIN — CYANOCOBALAMIN 1000 MICROGRAM(S): 1000 INJECTION INTRAMUSCULAR; SUBCUTANEOUS at 12:25

## 2025-03-29 RX ADMIN — EPOETIN ALFA 20000 UNIT(S): 10000 SOLUTION INTRAVENOUS; SUBCUTANEOUS at 12:24

## 2025-03-29 RX ADMIN — Medication 40 MILLIEQUIVALENT(S): at 13:49

## 2025-03-29 RX ADMIN — ATORVASTATIN CALCIUM 20 MILLIGRAM(S): 80 TABLET, FILM COATED ORAL at 21:08

## 2025-03-29 RX ADMIN — Medication 40 MILLIEQUIVALENT(S): at 17:13

## 2025-03-29 RX ADMIN — Medication 650 MILLIGRAM(S): at 05:04

## 2025-03-29 RX ADMIN — FOLIC ACID 1 MILLIGRAM(S): 1 TABLET ORAL at 12:25

## 2025-03-29 RX ADMIN — Medication 100 MILLIGRAM(S): at 12:25

## 2025-03-29 RX ADMIN — NYSTATIN 1 APPLICATION(S): 100000 CREAM TOPICAL at 05:07

## 2025-03-29 RX ADMIN — AMIODARONE HYDROCHLORIDE 100 MILLIGRAM(S): 50 INJECTION, SOLUTION INTRAVENOUS at 05:03

## 2025-03-29 RX ADMIN — ERGOCALCIFEROL 50000 UNIT(S): 1.25 CAPSULE ORAL at 13:48

## 2025-03-29 RX ADMIN — Medication 40 MILLIGRAM(S): at 17:04

## 2025-03-29 RX ADMIN — AMOXICILLIN AND CLAVULANATE POTASSIUM 1 TABLET(S): 500; 125 TABLET, FILM COATED ORAL at 17:04

## 2025-03-29 RX ADMIN — Medication 650 MILLIGRAM(S): at 17:05

## 2025-03-29 NOTE — PROGRESS NOTE ADULT - ASSESSMENT
75 y/o M w/ a hx of HFrEF, Paroxysmal Afib on AC, CKD3b, HTN, HLD, BPH here for mgmt of Acute On Chronic HFrEF and Epistaxis    #Acute On Chronic HFrEF  - DC lasix   - resume demadex in 24 hours if NA and cr improves   - cardiology input appreciated     #Paroxysmal Atrial Fibrillation on AC  - Dc eliquis per cardio   - outpatient follow up for watchman   - cont amiodarone 100mg Q24  - plan for outpatient LAAO     #Epistaxis  - HOLD AC as above  - ENT consulted s/p rocket in left nares, plan for removal 3/31  - cont empiric Augmentin while with rhino rocket in    #Anemia blood loss   - due to epistaxis   - melena likely due to above   - GI consulted no plan for EGD   - monitor HB stable   - s/p PRBC 3/26 overnight     #ANGEL on CKD3b -- worsening   - hold diuretics   - caution w/ nephrotoxic agents  - BMP in am     #BPH  - flomax    #Skin rash fungal skin infection   - cont nystatin powder     Dispo: DC in few days likely home pending renal recovery   lives alone

## 2025-03-29 NOTE — PROGRESS NOTE ADULT - ASSESSMENT
{\rtf1\bajlqv03338\ansi\ipghfah1332\ftnbj\uc1\deff0  {\fonttbl{\f0 \fnil Segoe UI;}{\f1 \fnil \fcharset0 Segoe UI;}{\f2 \fnil Times New Rakesh;}}  {\colortbl ;\jia088\qcnol994\sxdg518 ;\red0\green0\blue0 ;\red0\green0\blue0 ;}  {\stylesheet{\f0\fs20 Normal;}{\cs1 Default Paragraph Font;}{\cs2\f0\fs16 Line Number;}{\cs3\f2\fs24 Hyperlink;}}  {\*\revtbl{Unknown;}}  \fytrpm98557\hsvitc58550\fcgjn2818\gxxqc0129\okorl4333\zyhpz7612\rhqjzvv218\xdwylrz984\nogrowautofit\jxzikw264\formshade\nofeaturethrottle1\dntblnsbdb\fet4\aendnotes\aftnnrlc\pgbrdrhead\pgbrdrfoot  \sectd\puckun35334\uviusx65599\guttersxn0\cuspnyoc5757\usavpdpv7488\ufbtniwp5346\qxqkbkqg7335\ihjfbya308\tftugum193\sbkpage\pgncont\pgndec  \plain\plain\f0\fs24\ql\plain\f0\fs24\plain\f0\fs20\lfoq0991\hich\f0\dbch\f0\loch\f0\fs20 CKD(IIIb): Screat 2.7mg/dL in recent past\par  Now ANGEL in setting of decompensated CHF/diuretics and poor BP control vs progression of disease vs both\par  - avoid potential nephrotoxins\par  - adjust BP meds as needed to gradually optimize \par  - diuretics as needed and accept azotemia \par  - cont as per cardiology\par  - renal sono w/o hydro 3/28 \par  - Resp status improved ---> Lasix held \par  - Component of elevated BUN due to Epistaxis \par  - Check post void bladder scan \par  \par  \par  HTN - Watch on meds \par  \par  Anemia: +CKD + GI loss\par  - Iron stores OK \par  - added Retacrit and folate \par  - PRBCs as needed\par  - GI eval\par  \par  RO:\par  -  Vit D 28 and iPTH 244\par  - Added weekly ergo\par  \par  \plain\f1\fs20\vvzy1639\hich\f1\dbch\f1\loch\f1\cf2\fs20\strike\plain\f1\fs20\wnpb4275\hich\f1\dbch\f1\loch\f1\cf2\fs20\plain\f0\fs20\qpnk9181\hich\f0\dbch\f0\loch\f0\fs20\par  }   CKD(IIIb): Screat 2.7mg/dL in recent past  Now ANGEL in setting of decompensated CHF/diuretics and poor BP control vs progression of disease vs both  - avoid potential nephrotoxins  - adjust BP meds as needed to gradually optimize   - diuretics as needed and accept azotemia   - cont as per cardiology  - renal sono w/o hydro 3/28   - Resp status improved ---> Lasix held   - Component of elevated BUN due to Epistaxis   - Check post void bladder scan       HTN - Watch on meds     Anemia: +CKD + GI loss  - Iron stores OK   - added Retacrit and folate     Hypernatremia - Better     Low potassium - mag OK , Supplement K+   - PRBCs as needed  - GI eval    RO:  -  Vit D 28 and iPTH 244  - Added weekly ergo

## 2025-03-29 NOTE — PROGRESS NOTE ADULT - SUBJECTIVE AND OBJECTIVE BOX
NEPHROLOGY INTERVAL HPI/OVERNIGHT EVENTS:    Feels better   ENT follow up noted   No active nasal bleeding   IV lasix held     MEDICATIONS  (STANDING):  allopurinol 100 milliGRAM(s) Oral daily  aMIOdarone    Tablet 100 milliGRAM(s) Oral daily  atorvastatin 20 milliGRAM(s) Oral at bedtime  cyanocobalamin 1000 MICROGram(s) Oral daily  epoetin esvin-epbx (RETACRIT) Injectable 40987 Unit(s) SubCutaneous every 7 days  ergocalciferol 37817 Unit(s) Oral every week  folic acid 1 milliGRAM(s) Oral daily  influenza  Vaccine (HIGH DOSE) 0.5 milliLiter(s) IntraMuscular once  metoprolol succinate ER 25 milliGRAM(s) Oral daily  nystatin Powder 1 Application(s) Topical two times a day  pantoprazole  Injectable 40 milliGRAM(s) IV Push every 12 hours  sodium bicarbonate 650 milliGRAM(s) Oral two times a day  tamsulosin 0.4 milliGRAM(s) Oral at bedtime    MEDICATIONS  (PRN):  acetaminophen     Tablet .. 650 milliGRAM(s) Oral every 6 hours PRN Temp greater or equal to 38C (100.4F), Mild Pain (1 - 3)  aluminum hydroxide/magnesium hydroxide/simethicone Suspension 30 milliLiter(s) Oral every 4 hours PRN Dyspepsia  melatonin 3 milliGRAM(s) Oral at bedtime PRN Insomnia  ondansetron Injectable 4 milliGRAM(s) IV Push every 8 hours PRN Nausea and/or Vomiting      Allergies    No Known Allergies    Intolerances        Vital Signs Last 24 Hrs  T(C): 36.4 (29 Mar 2025 07:40), Max: 36.9 (29 Mar 2025 05:00)  T(F): 97.5 (29 Mar 2025 07:40), Max: 98.5 (29 Mar 2025 05:00)  HR: 55 (29 Mar 2025 07:40) (55 - 60)  BP: 115/60 (29 Mar 2025 07:40) (100/53 - 115/60)  BP(mean): --  RR: 18 (29 Mar 2025 07:40) (17 - 19)  SpO2: 96% (29 Mar 2025 07:40) (96% - 99%)    Parameters below as of 29 Mar 2025 07:40  Patient On (Oxygen Delivery Method): room air      Daily     Daily Weight in k.8 (28 Mar 2025 19:54)  I&O's Detail    28 Mar 2025 07:01  -  29 Mar 2025 07:00  --------------------------------------------------------  IN:    Oral Fluid: 250 mL  Total IN: 250 mL    OUT:    Voided (mL): 400 mL  Total OUT: 400 mL    Total NET: -150 mL        I&O's Summary    28 Mar 2025 07:01  -  29 Mar 2025 07:00  --------------------------------------------------------  IN: 250 mL / OUT: 400 mL / NET: -150 mL      PHYSICAL EXAM:  GENERAL: Fatigued, weak  HEAD: L nasal tampon  EYES: Conjunctiva and sclera clear  ENMT: Moist mucous membranes, Good dentition, No lesions  NECK: Supple, No JVD  NERVOUS SYSTEM:  Alert & Oriented X3, intact and symmetric  CHEST/LUNG: Diminished B/L BS, rales  HEART: No rub  ABDOMEN: Soft, Nontender, +BS  EXTREMITIES:  2+ Peripheral Pulses, + dependent edema - better   LABS:                        7.8    7.80  )-----------( 138      ( 29 Mar 2025 05:27 )             24.2         146[H]  |  109[H]  |  70.8[H]  ----------------------------<  95  3.1[L]   |  20.0[L]  |  4.42[H]    Ca    7.9[L]      29 Mar 2025 05:27  Phos  3.3     -  Mg     2.3             Urinalysis Basic - ( 29 Mar 2025 05:27 )    Color: x / Appearance: x / SG: x / pH: x  Gluc: 95 mg/dL / Ketone: x  / Bili: x / Urobili: x   Blood: x / Protein: x / Nitrite: x   Leuk Esterase: x / RBC: x / WBC x   Sq Epi: x / Non Sq Epi: x / Bacteria: x      Magnesium: 2.3 mg/dL ( @ 05:27)          RADIOLOGY & ADDITIONAL TESTS:

## 2025-03-29 NOTE — PROGRESS NOTE ADULT - SUBJECTIVE AND OBJECTIVE BOX
Brigida Greene M.D.    Patient is a 74y old  Male who presents with a chief complaint of Acute On Chronic HFrEF (29 Mar 2025 08:12)      SUBJECTIVE / OVERNIGHT EVENTS: no event overnight     Patient denies chest pain, SOB, abd pain, N/V, fever, chills, dysuria or any other complaints. All remainder ROS negative.     MEDICATIONS  (STANDING):  allopurinol 100 milliGRAM(s) Oral daily  aMIOdarone    Tablet 100 milliGRAM(s) Oral daily  amoxicillin  875 milliGRAM(s)/clavulanate 1 Tablet(s) Oral two times a day  atorvastatin 20 milliGRAM(s) Oral at bedtime  cyanocobalamin 1000 MICROGram(s) Oral daily  epoetin esvin-epbx (RETACRIT) Injectable 76446 Unit(s) SubCutaneous every 7 days  ergocalciferol 99185 Unit(s) Oral every week  folic acid 1 milliGRAM(s) Oral daily  influenza  Vaccine (HIGH DOSE) 0.5 milliLiter(s) IntraMuscular once  metoprolol succinate ER 25 milliGRAM(s) Oral daily  nystatin Powder 1 Application(s) Topical two times a day  pantoprazole  Injectable 40 milliGRAM(s) IV Push every 12 hours  potassium chloride    Tablet ER 40 milliEquivalent(s) Oral every 4 hours  sodium bicarbonate 650 milliGRAM(s) Oral two times a day  tamsulosin 0.4 milliGRAM(s) Oral at bedtime    MEDICATIONS  (PRN):  acetaminophen     Tablet .. 650 milliGRAM(s) Oral every 6 hours PRN Temp greater or equal to 38C (100.4F), Mild Pain (1 - 3)  aluminum hydroxide/magnesium hydroxide/simethicone Suspension 30 milliLiter(s) Oral every 4 hours PRN Dyspepsia  melatonin 3 milliGRAM(s) Oral at bedtime PRN Insomnia  ondansetron Injectable 4 milliGRAM(s) IV Push every 8 hours PRN Nausea and/or Vomiting      I&O's Summary    28 Mar 2025 07:01  -  29 Mar 2025 07:00  --------------------------------------------------------  IN: 250 mL / OUT: 400 mL / NET: -150 mL    29 Mar 2025 07:01  -  29 Mar 2025 14:51  --------------------------------------------------------  IN: 240 mL / OUT: 400 mL / NET: -160 mL        PHYSICAL EXAM:  Vital Signs Last 24 Hrs  T(C): 36.4 (29 Mar 2025 12:41), Max: 36.9 (29 Mar 2025 05:00)  T(F): 97.5 (29 Mar 2025 12:41), Max: 98.5 (29 Mar 2025 05:00)  HR: 66 (29 Mar 2025 12:41) (55 - 66)  BP: 100/56 (29 Mar 2025 12:41) (100/53 - 115/60)  BP(mean): --  RR: 18 (29 Mar 2025 12:41) (17 - 19)  SpO2: 96% (29 Mar 2025 12:41) (96% - 99%)    Parameters below as of 29 Mar 2025 12:41  Patient On (Oxygen Delivery Method): room air      CONSTITUTIONAL: NAD, well-groomed  RESPIRATORY: Normal respiratory effort; mild crackles noted  CARDIOVASCULAR: Regular rate and rhythm, no LE edema  ABDOMEN: Nontender to palpation, normoactive bowel sounds    LABS:                        7.8    7.80  )-----------( 138      ( 29 Mar 2025 05:27 )             24.2     03-29    146[H]  |  109[H]  |  70.8[H]  ----------------------------<  95  3.1[L]   |  20.0[L]  |  4.42[H]    Ca    7.9[L]      29 Mar 2025 05:27  Phos  3.3     03-28  Mg     2.3     03-29            Urinalysis Basic - ( 29 Mar 2025 05:27 )    Color: x / Appearance: x / SG: x / pH: x  Gluc: 95 mg/dL / Ketone: x  / Bili: x / Urobili: x   Blood: x / Protein: x / Nitrite: x   Leuk Esterase: x / RBC: x / WBC x   Sq Epi: x / Non Sq Epi: x / Bacteria: x        CAPILLARY BLOOD GLUCOSE          RADIOLOGY & ADDITIONAL TESTS:  Results Reviewed:   Imaging Personally Reviewed:  Electrocardiogram Personally Reviewed:

## 2025-03-30 LAB
ANION GAP SERPL CALC-SCNC: 15 MMOL/L — SIGNIFICANT CHANGE UP (ref 5–17)
BUN SERPL-MCNC: 62.3 MG/DL — HIGH (ref 8–20)
CALCIUM SERPL-MCNC: 7.6 MG/DL — LOW (ref 8.4–10.5)
CHLORIDE SERPL-SCNC: 110 MMOL/L — HIGH (ref 96–108)
CO2 SERPL-SCNC: 20 MMOL/L — LOW (ref 22–29)
CREAT SERPL-MCNC: 4.44 MG/DL — HIGH (ref 0.5–1.3)
EGFR: 13 ML/MIN/1.73M2 — LOW
EGFR: 13 ML/MIN/1.73M2 — LOW
GLUCOSE SERPL-MCNC: 110 MG/DL — HIGH (ref 70–99)
HCT VFR BLD CALC: 24.1 % — LOW (ref 39–50)
HGB BLD-MCNC: 7.7 G/DL — LOW (ref 13–17)
MAGNESIUM SERPL-MCNC: 2.1 MG/DL — SIGNIFICANT CHANGE UP (ref 1.6–2.6)
MCHC RBC-ENTMCNC: 32 G/DL — SIGNIFICANT CHANGE UP (ref 32–36)
MCHC RBC-ENTMCNC: 33.2 PG — SIGNIFICANT CHANGE UP (ref 27–34)
MCV RBC AUTO: 103.9 FL — HIGH (ref 80–100)
NRBC # BLD AUTO: 0 K/UL — SIGNIFICANT CHANGE UP (ref 0–0)
NRBC # FLD: 0 K/UL — SIGNIFICANT CHANGE UP (ref 0–0)
NRBC BLD AUTO-RTO: 0 /100 WBCS — SIGNIFICANT CHANGE UP (ref 0–0)
PLATELET # BLD AUTO: 133 K/UL — LOW (ref 150–400)
PMV BLD: 11.2 FL — SIGNIFICANT CHANGE UP (ref 7–13)
POTASSIUM SERPL-MCNC: 3.8 MMOL/L — SIGNIFICANT CHANGE UP (ref 3.5–5.3)
POTASSIUM SERPL-SCNC: 3.8 MMOL/L — SIGNIFICANT CHANGE UP (ref 3.5–5.3)
RBC # BLD: 2.32 M/UL — LOW (ref 4.2–5.8)
RBC # FLD: 15.2 % — HIGH (ref 10.3–14.5)
SODIUM SERPL-SCNC: 145 MMOL/L — SIGNIFICANT CHANGE UP (ref 135–145)
WBC # BLD: 10.14 K/UL — SIGNIFICANT CHANGE UP (ref 3.8–10.5)
WBC # FLD AUTO: 10.14 K/UL — SIGNIFICANT CHANGE UP (ref 3.8–10.5)

## 2025-03-30 PROCEDURE — 99232 SBSQ HOSP IP/OBS MODERATE 35: CPT

## 2025-03-30 RX ADMIN — AMIODARONE HYDROCHLORIDE 100 MILLIGRAM(S): 50 INJECTION, SOLUTION INTRAVENOUS at 05:15

## 2025-03-30 RX ADMIN — NYSTATIN 1 APPLICATION(S): 100000 CREAM TOPICAL at 05:18

## 2025-03-30 RX ADMIN — AMOXICILLIN AND CLAVULANATE POTASSIUM 1 TABLET(S): 500; 125 TABLET, FILM COATED ORAL at 05:15

## 2025-03-30 RX ADMIN — TAMSULOSIN HYDROCHLORIDE 0.4 MILLIGRAM(S): 0.4 CAPSULE ORAL at 22:34

## 2025-03-30 RX ADMIN — Medication 650 MILLIGRAM(S): at 05:15

## 2025-03-30 RX ADMIN — Medication 100 MILLIGRAM(S): at 12:07

## 2025-03-30 RX ADMIN — ATORVASTATIN CALCIUM 20 MILLIGRAM(S): 80 TABLET, FILM COATED ORAL at 22:34

## 2025-03-30 RX ADMIN — AMOXICILLIN AND CLAVULANATE POTASSIUM 1 TABLET(S): 500; 125 TABLET, FILM COATED ORAL at 18:40

## 2025-03-30 RX ADMIN — Medication 40 MILLIGRAM(S): at 05:15

## 2025-03-30 RX ADMIN — Medication 40 MILLIGRAM(S): at 18:39

## 2025-03-30 RX ADMIN — FOLIC ACID 1 MILLIGRAM(S): 1 TABLET ORAL at 12:08

## 2025-03-30 RX ADMIN — Medication 650 MILLIGRAM(S): at 18:39

## 2025-03-30 RX ADMIN — CYANOCOBALAMIN 1000 MICROGRAM(S): 1000 INJECTION INTRAMUSCULAR; SUBCUTANEOUS at 12:08

## 2025-03-30 RX ADMIN — NYSTATIN 1 APPLICATION(S): 100000 CREAM TOPICAL at 18:37

## 2025-03-30 RX ADMIN — METOPROLOL SUCCINATE 25 MILLIGRAM(S): 50 TABLET, EXTENDED RELEASE ORAL at 05:15

## 2025-03-30 NOTE — PROGRESS NOTE ADULT - ASSESSMENT
CKD(IIIb): Screat 2.7mg/dL in recent past  Now ANGEL in setting of decompensated CHF/diuretics and poor BP control vs progression of disease vs both  - avoid potential nephrotoxins  - renal sono w/o hydro 3/28   - Resp status improved ---> Lasix held   - Component of elevated BUN due to Epistaxis   - Checked post void bladder scan m ---> will need to follow     HTN - Watch on meds     Anemia: +CKD + GI loss  - Iron stores OK   - added Retacrit and folate   - PRBCs as needed  - GI eval    RO:  -  Vit D 28 and iPTH 244  - Added weekly ergo

## 2025-03-30 NOTE — PROGRESS NOTE ADULT - ASSESSMENT
75 y/o M w/ a hx of HFrEF, Paroxysmal Afib on AC, CKD3b, HTN, HLD, BPH here for mgmt of Acute On Chronic HFrEF and Epistaxis    #Acute On Chronic HFrEF  - DC lasix   - resume demadex if NA and cr improves   - cardiology input appreciated     #Paroxysmal Atrial Fibrillation on AC  - Dc eliquis per cardio   - outpatient follow up for watchman   - cont amiodarone 100mg Q24  - plan for outpatient LAAO     #Epistaxis  - HOLD AC as above  - ENT consulted s/p rocket in left nares, plan for removal 3/31  - cont empiric Augmentin while with rhino rocket in    #Anemia blood loss   - due to epistaxis   - melena likely due to above   - GI consulted no plan for EGD   - monitor HB stable   - s/p PRBC 3/26 overnight     #ANGEL on CKD3b -- worsening   - hold diuretics   - caution w/ nephrotoxic agents  - BMP in am     #BPH  - flomax    #Skin rash fungal skin infection   - cont nystatin powder     Dispo: DC in few days likely home pending renal recovery and ENT clearance   lives alone

## 2025-03-30 NOTE — PROGRESS NOTE ADULT - SUBJECTIVE AND OBJECTIVE BOX
Brigida Greene M.D.    Patient is a 74y old  Male who presents with a chief complaint of Acute On Chronic HFrEF (30 Mar 2025 08:08)      SUBJECTIVE / OVERNIGHT EVENTS: no concerns.     Patient denies chest pain, SOB, abd pain, N/V, fever, chills, dysuria or any other complaints. All remainder ROS negative.     MEDICATIONS  (STANDING):  allopurinol 100 milliGRAM(s) Oral daily  aMIOdarone    Tablet 100 milliGRAM(s) Oral daily  amoxicillin  875 milliGRAM(s)/clavulanate 1 Tablet(s) Oral two times a day  atorvastatin 20 milliGRAM(s) Oral at bedtime  cyanocobalamin 1000 MICROGram(s) Oral daily  epoetin esvin-epbx (RETACRIT) Injectable 10667 Unit(s) SubCutaneous every 7 days  ergocalciferol 75565 Unit(s) Oral every week  folic acid 1 milliGRAM(s) Oral daily  influenza  Vaccine (HIGH DOSE) 0.5 milliLiter(s) IntraMuscular once  metoprolol succinate ER 25 milliGRAM(s) Oral daily  nystatin Powder 1 Application(s) Topical two times a day  pantoprazole  Injectable 40 milliGRAM(s) IV Push every 12 hours  sodium bicarbonate 650 milliGRAM(s) Oral two times a day  tamsulosin 0.4 milliGRAM(s) Oral at bedtime    MEDICATIONS  (PRN):  acetaminophen     Tablet .. 650 milliGRAM(s) Oral every 6 hours PRN Temp greater or equal to 38C (100.4F), Mild Pain (1 - 3)  aluminum hydroxide/magnesium hydroxide/simethicone Suspension 30 milliLiter(s) Oral every 4 hours PRN Dyspepsia  melatonin 3 milliGRAM(s) Oral at bedtime PRN Insomnia  ondansetron Injectable 4 milliGRAM(s) IV Push every 8 hours PRN Nausea and/or Vomiting      I&O's Summary    29 Mar 2025 07:01  -  30 Mar 2025 07:00  --------------------------------------------------------  IN: 970 mL / OUT: 1000 mL / NET: -30 mL    30 Mar 2025 07:01  -  30 Mar 2025 10:49  --------------------------------------------------------  IN: 0 mL / OUT: 475 mL / NET: -475 mL        PHYSICAL EXAM:  Vital Signs Last 24 Hrs  T(C): 36.8 (30 Mar 2025 09:15), Max: 36.8 (30 Mar 2025 05:00)  T(F): 98.3 (30 Mar 2025 09:15), Max: 98.3 (30 Mar 2025 05:00)  HR: 75 (30 Mar 2025 09:15) (56 - 75)  BP: 131/72 (30 Mar 2025 09:15) (99/58 - 131/72)  BP(mean): --  RR: 18 (30 Mar 2025 09:15) (18 - 19)  SpO2: 92% (30 Mar 2025 09:15) (92% - 100%)    Parameters below as of 30 Mar 2025 09:15  Patient On (Oxygen Delivery Method): room air    CONSTITUTIONAL: NAD, well-groomed  RESPIRATORY: Normal respiratory effort; mild crackles noted  CARDIOVASCULAR: Regular rate and rhythm, no LE edema  ABDOMEN: Nontender to palpation, normoactive bowel sounds      LABS:                        7.7    10.14 )-----------( 133      ( 30 Mar 2025 05:54 )             24.1     03-30    145  |  110[H]  |  62.3[H]  ----------------------------<  110[H]  3.8   |  20.0[L]  |  4.44[H]    Ca    7.6[L]      30 Mar 2025 05:54  Mg     2.1     03-30            Urinalysis Basic - ( 30 Mar 2025 05:54 )    Color: x / Appearance: x / SG: x / pH: x  Gluc: 110 mg/dL / Ketone: x  / Bili: x / Urobili: x   Blood: x / Protein: x / Nitrite: x   Leuk Esterase: x / RBC: x / WBC x   Sq Epi: x / Non Sq Epi: x / Bacteria: x        CAPILLARY BLOOD GLUCOSE          RADIOLOGY & ADDITIONAL TESTS:  Results Reviewed:   Imaging Personally Reviewed:  Electrocardiogram Personally Reviewed:

## 2025-03-30 NOTE — PROGRESS NOTE ADULT - SUBJECTIVE AND OBJECTIVE BOX
NEPHROLOGY INTERVAL HPI/OVERNIGHT EVENTS:    No events   Meds the same   UO 1 L   Diuretics on hold         MEDICATIONS  (STANDING):  allopurinol 100 milliGRAM(s) Oral daily  aMIOdarone    Tablet 100 milliGRAM(s) Oral daily  amoxicillin  875 milliGRAM(s)/clavulanate 1 Tablet(s) Oral two times a day  atorvastatin 20 milliGRAM(s) Oral at bedtime  cyanocobalamin 1000 MICROGram(s) Oral daily  epoetin esvin-epbx (RETACRIT) Injectable 17026 Unit(s) SubCutaneous every 7 days  ergocalciferol 42227 Unit(s) Oral every week  folic acid 1 milliGRAM(s) Oral daily  influenza  Vaccine (HIGH DOSE) 0.5 milliLiter(s) IntraMuscular once  metoprolol succinate ER 25 milliGRAM(s) Oral daily  nystatin Powder 1 Application(s) Topical two times a day  pantoprazole  Injectable 40 milliGRAM(s) IV Push every 12 hours  sodium bicarbonate 650 milliGRAM(s) Oral two times a day  tamsulosin 0.4 milliGRAM(s) Oral at bedtime    MEDICATIONS  (PRN):  acetaminophen     Tablet .. 650 milliGRAM(s) Oral every 6 hours PRN Temp greater or equal to 38C (100.4F), Mild Pain (1 - 3)  aluminum hydroxide/magnesium hydroxide/simethicone Suspension 30 milliLiter(s) Oral every 4 hours PRN Dyspepsia  melatonin 3 milliGRAM(s) Oral at bedtime PRN Insomnia  ondansetron Injectable 4 milliGRAM(s) IV Push every 8 hours PRN Nausea and/or Vomiting      Allergies    No Known Allergies    Intolerances          Vital Signs Last 24 Hrs  T(C): 36.8 (30 Mar 2025 05:00), Max: 36.8 (30 Mar 2025 05:00)  T(F): 98.3 (30 Mar 2025 05:00), Max: 98.3 (30 Mar 2025 05:00)  HR: 64 (30 Mar 2025 05:00) (56 - 66)  BP: 105/68 (30 Mar 2025 05:00) (99/58 - 114/63)  BP(mean): --  RR: 18 (30 Mar 2025 05:00) (18 - 19)  SpO2: 98% (30 Mar 2025 05:00) (96% - 100%)    Parameters below as of 30 Mar 2025 05:00  Patient On (Oxygen Delivery Method): room air      Daily     Daily Weight in k.2 (29 Mar 2025 19:02)  I&O's Detail    29 Mar 2025 07:01  -  30 Mar 2025 07:00  --------------------------------------------------------  IN:    Oral Fluid: 970 mL  Total IN: 970 mL    OUT:    Voided (mL): 1000 mL  Total OUT: 1000 mL    Total NET: -30 mL        I&O's Summary    29 Mar 2025 07:01  -  30 Mar 2025 07:00  --------------------------------------------------------  IN: 970 mL / OUT: 1000 mL / NET: -30 mL      PHYSICAL EXAM:  GENERAL: Fatigued, weak  HEAD: L nasal tampon  NECK: Supple, No JVD  NERVOUS SYSTEM:  Alert & Oriented X3,   CHEST/LUNG: EAE, Improved   HEART: No rub  ABDOMEN: Soft, Nontender, +BS  EXTREMITIES:  + dependent edema - better     LABS:                        7.7    10.14 )-----------( 133      ( 30 Mar 2025 05:54 )             24.1     03-30    145  |  110[H]  |  62.3[H]  ----------------------------<  110[H]  3.8   |  20.0[L]  |  4.44[H]    Ca    7.6[L]      30 Mar 2025 05:54  Mg     2.1     03-30        Urinalysis Basic - ( 30 Mar 2025 05:54 )    Color: x / Appearance: x / SG: x / pH: x  Gluc: 110 mg/dL / Ketone: x  / Bili: x / Urobili: x   Blood: x / Protein: x / Nitrite: x   Leuk Esterase: x / RBC: x / WBC x   Sq Epi: x / Non Sq Epi: x / Bacteria: x      Magnesium: 2.1 mg/dL ( @ 05:54)          RADIOLOGY & ADDITIONAL TESTS:   NEPHROLOGY INTERVAL HPI/OVERNIGHT EVENTS:    No events   Meds the same   UO 1 L   Diuretics on hold         MEDICATIONS  (STANDING):  allopurinol 100 milliGRAM(s) Oral daily  aMIOdarone    Tablet 100 milliGRAM(s) Oral daily  amoxicillin  875 milliGRAM(s)/clavulanate 1 Tablet(s) Oral two times a day  atorvastatin 20 milliGRAM(s) Oral at bedtime  cyanocobalamin 1000 MICROGram(s) Oral daily  epoetin esvin-epbx (RETACRIT) Injectable 44383 Unit(s) SubCutaneous every 7 days  ergocalciferol 39090 Unit(s) Oral every week  folic acid 1 milliGRAM(s) Oral daily  influenza  Vaccine (HIGH DOSE) 0.5 milliLiter(s) IntraMuscular once  metoprolol succinate ER 25 milliGRAM(s) Oral daily  nystatin Powder 1 Application(s) Topical two times a day  pantoprazole  Injectable 40 milliGRAM(s) IV Push every 12 hours  sodium bicarbonate 650 milliGRAM(s) Oral two times a day  tamsulosin 0.4 milliGRAM(s) Oral at bedtime    MEDICATIONS  (PRN):  acetaminophen     Tablet .. 650 milliGRAM(s) Oral every 6 hours PRN Temp greater or equal to 38C (100.4F), Mild Pain (1 - 3)  aluminum hydroxide/magnesium hydroxide/simethicone Suspension 30 milliLiter(s) Oral every 4 hours PRN Dyspepsia  melatonin 3 milliGRAM(s) Oral at bedtime PRN Insomnia  ondansetron Injectable 4 milliGRAM(s) IV Push every 8 hours PRN Nausea and/or Vomiting      Allergies    No Known Allergies    Intolerances          Vital Signs Last 24 Hrs  T(C): 36.8 (30 Mar 2025 05:00), Max: 36.8 (30 Mar 2025 05:00)  T(F): 98.3 (30 Mar 2025 05:00), Max: 98.3 (30 Mar 2025 05:00)  HR: 64 (30 Mar 2025 05:00) (56 - 66)  BP: 105/68 (30 Mar 2025 05:00) (99/58 - 114/63)  BP(mean): --  RR: 18 (30 Mar 2025 05:00) (18 - 19)  SpO2: 98% (30 Mar 2025 05:00) (96% - 100%)    Parameters below as of 30 Mar 2025 05:00  Patient On (Oxygen Delivery Method): room air      Daily     Daily Weight in k.2 (29 Mar 2025 19:02)  I&O's Detail    29 Mar 2025 07:01  -  30 Mar 2025 07:00  --------------------------------------------------------  IN:    Oral Fluid: 970 mL  Total IN: 970 mL    OUT:    Voided (mL): 1000 mL  Total OUT: 1000 mL    Total NET: -30 mL        I&O's Summary    29 Mar 2025 07:01  -  30 Mar 2025 07:00  --------------------------------------------------------  IN: 970 mL / OUT: 1000 mL / NET: -30 mL      PHYSICAL EXAM:  GENERAL: Fatigued, weak  HEAD: L nasal tampon  NECK: Supple, No JVD  NERVOUS SYSTEM:  Alert & Oriented X3,   CHEST/LUNG: EAE, Improved   HEART: No rub  ABDOMEN: Soft, Nontender, +BS  EXTREMITIES:  + dependent edema - better     LABS:                        7.7    10.14 )-----------( 133      ( 30 Mar 2025 05:54 )             24.1     03-30    145  |  110[H]  |  62.3[H]  ----------------------------<  110[H]  3.8   |  20.0[L]  |  4.44[H]    Ca    7.6[L]      30 Mar 2025 05:54  Mg     2.1     03-30        Urinalysis Basic - ( 30 Mar 2025 05:54 )    Color: x / Appearance: x / SG: x / pH: x  Gluc: 110 mg/dL / Ketone: x  / Bili: x / Urobili: x   Blood: x / Protein: x / Nitrite: x   Leuk Esterase: x / RBC: x / WBC x   Sq Epi: x / Non Sq Epi: x / Bacteria: x      Magnesium: 2.1 mg/dL ( @ 05:54)          RADIOLOGY & ADDITIONAL TESTS:

## 2025-03-31 ENCOUNTER — APPOINTMENT (OUTPATIENT)
Dept: OTOLARYNGOLOGY | Facility: CLINIC | Age: 75
End: 2025-03-31

## 2025-03-31 LAB
ANION GAP SERPL CALC-SCNC: 16 MMOL/L — SIGNIFICANT CHANGE UP (ref 5–17)
BLD GP AB SCN SERPL QL: SIGNIFICANT CHANGE UP
BUN SERPL-MCNC: 59.2 MG/DL — HIGH (ref 8–20)
CALCIUM SERPL-MCNC: 7.7 MG/DL — LOW (ref 8.4–10.5)
CHLORIDE SERPL-SCNC: 108 MMOL/L — SIGNIFICANT CHANGE UP (ref 96–108)
CO2 SERPL-SCNC: 19 MMOL/L — LOW (ref 22–29)
CREAT SERPL-MCNC: 4.34 MG/DL — HIGH (ref 0.5–1.3)
EGFR: 14 ML/MIN/1.73M2 — LOW
EGFR: 14 ML/MIN/1.73M2 — LOW
GLUCOSE SERPL-MCNC: 100 MG/DL — HIGH (ref 70–99)
HCT VFR BLD CALC: 22.8 % — LOW (ref 39–50)
HCT VFR BLD CALC: 22.9 % — LOW (ref 39–50)
HGB BLD-MCNC: 7.3 G/DL — LOW (ref 13–17)
HGB BLD-MCNC: 7.4 G/DL — LOW (ref 13–17)
MAGNESIUM SERPL-MCNC: 2.2 MG/DL — SIGNIFICANT CHANGE UP (ref 1.6–2.6)
MCHC RBC-ENTMCNC: 32 G/DL — SIGNIFICANT CHANGE UP (ref 32–36)
MCHC RBC-ENTMCNC: 32.3 G/DL — SIGNIFICANT CHANGE UP (ref 32–36)
MCHC RBC-ENTMCNC: 33.5 PG — SIGNIFICANT CHANGE UP (ref 27–34)
MCHC RBC-ENTMCNC: 33.9 PG — SIGNIFICANT CHANGE UP (ref 27–34)
MCV RBC AUTO: 104.6 FL — HIGH (ref 80–100)
MCV RBC AUTO: 105 FL — HIGH (ref 80–100)
NRBC # BLD AUTO: 0 K/UL — SIGNIFICANT CHANGE UP (ref 0–0)
NRBC # BLD AUTO: 0.02 K/UL — HIGH (ref 0–0)
NRBC # FLD: 0 K/UL — SIGNIFICANT CHANGE UP (ref 0–0)
NRBC # FLD: 0.02 K/UL — HIGH (ref 0–0)
NRBC BLD AUTO-RTO: 0 /100 WBCS — SIGNIFICANT CHANGE UP (ref 0–0)
NRBC BLD AUTO-RTO: 0 /100 WBCS — SIGNIFICANT CHANGE UP (ref 0–0)
PLATELET # BLD AUTO: 125 K/UL — LOW (ref 150–400)
PLATELET # BLD AUTO: 126 K/UL — LOW (ref 150–400)
PMV BLD: 10.3 FL — SIGNIFICANT CHANGE UP (ref 7–13)
PMV BLD: 11.3 FL — SIGNIFICANT CHANGE UP (ref 7–13)
POTASSIUM SERPL-MCNC: 3.9 MMOL/L — SIGNIFICANT CHANGE UP (ref 3.5–5.3)
POTASSIUM SERPL-SCNC: 3.9 MMOL/L — SIGNIFICANT CHANGE UP (ref 3.5–5.3)
RBC # BLD: 2.18 M/UL — LOW (ref 4.2–5.8)
RBC # BLD: 2.18 M/UL — LOW (ref 4.2–5.8)
RBC # FLD: 15.2 % — HIGH (ref 10.3–14.5)
RBC # FLD: 15.2 % — HIGH (ref 10.3–14.5)
SODIUM SERPL-SCNC: 143 MMOL/L — SIGNIFICANT CHANGE UP (ref 135–145)
WBC # BLD: 8.25 K/UL — SIGNIFICANT CHANGE UP (ref 3.8–10.5)
WBC # BLD: 9.02 K/UL — SIGNIFICANT CHANGE UP (ref 3.8–10.5)
WBC # FLD AUTO: 8.25 K/UL — SIGNIFICANT CHANGE UP (ref 3.8–10.5)
WBC # FLD AUTO: 9.02 K/UL — SIGNIFICANT CHANGE UP (ref 3.8–10.5)

## 2025-03-31 PROCEDURE — 99231 SBSQ HOSP IP/OBS SF/LOW 25: CPT

## 2025-03-31 PROCEDURE — 99232 SBSQ HOSP IP/OBS MODERATE 35: CPT

## 2025-03-31 RX ORDER — FUROSEMIDE 10 MG/ML
40 INJECTION INTRAMUSCULAR; INTRAVENOUS ONCE
Refills: 0 | Status: COMPLETED | OUTPATIENT
Start: 2025-03-31 | End: 2025-03-31

## 2025-03-31 RX ORDER — SODIUM CHLORIDE 0.65 %
1 AEROSOL, SPRAY (ML) NASAL EVERY 6 HOURS
Refills: 0 | Status: DISCONTINUED | OUTPATIENT
Start: 2025-03-31 | End: 2025-04-04

## 2025-03-31 RX ORDER — CARVEDILOL 3.12 MG/1
3.12 TABLET, FILM COATED ORAL EVERY 12 HOURS
Refills: 0 | Status: DISCONTINUED | OUTPATIENT
Start: 2025-03-31 | End: 2025-04-04

## 2025-03-31 RX ADMIN — ATORVASTATIN CALCIUM 20 MILLIGRAM(S): 80 TABLET, FILM COATED ORAL at 20:04

## 2025-03-31 RX ADMIN — Medication 650 MILLIGRAM(S): at 05:34

## 2025-03-31 RX ADMIN — METOPROLOL SUCCINATE 25 MILLIGRAM(S): 50 TABLET, EXTENDED RELEASE ORAL at 05:34

## 2025-03-31 RX ADMIN — Medication 650 MILLIGRAM(S): at 17:22

## 2025-03-31 RX ADMIN — AMOXICILLIN AND CLAVULANATE POTASSIUM 1 TABLET(S): 500; 125 TABLET, FILM COATED ORAL at 05:33

## 2025-03-31 RX ADMIN — FUROSEMIDE 40 MILLIGRAM(S): 10 INJECTION INTRAMUSCULAR; INTRAVENOUS at 11:09

## 2025-03-31 RX ADMIN — NYSTATIN 1 APPLICATION(S): 100000 CREAM TOPICAL at 05:36

## 2025-03-31 RX ADMIN — CYANOCOBALAMIN 1000 MICROGRAM(S): 1000 INJECTION INTRAMUSCULAR; SUBCUTANEOUS at 11:10

## 2025-03-31 RX ADMIN — AMIODARONE HYDROCHLORIDE 100 MILLIGRAM(S): 50 INJECTION, SOLUTION INTRAVENOUS at 05:33

## 2025-03-31 RX ADMIN — NYSTATIN 1 APPLICATION(S): 100000 CREAM TOPICAL at 17:25

## 2025-03-31 RX ADMIN — FOLIC ACID 1 MILLIGRAM(S): 1 TABLET ORAL at 11:10

## 2025-03-31 RX ADMIN — CARVEDILOL 3.12 MILLIGRAM(S): 3.12 TABLET, FILM COATED ORAL at 17:27

## 2025-03-31 RX ADMIN — Medication 100 MILLIGRAM(S): at 11:10

## 2025-03-31 RX ADMIN — Medication 1 SPRAY(S): at 11:09

## 2025-03-31 RX ADMIN — Medication 1 SPRAY(S): at 17:24

## 2025-03-31 RX ADMIN — Medication 40 MILLIGRAM(S): at 05:33

## 2025-03-31 RX ADMIN — TAMSULOSIN HYDROCHLORIDE 0.4 MILLIGRAM(S): 0.4 CAPSULE ORAL at 20:05

## 2025-03-31 RX ADMIN — Medication 40 MILLIGRAM(S): at 17:20

## 2025-03-31 NOTE — PROGRESS NOTE ADULT - SUBJECTIVE AND OBJECTIVE BOX
[facility]                                   [address] [place] [address]                                                      PHONE: [phone]                                                         FAX: [phone]  [email or web address]  ---------------------------------------------------------------------------------------------------------------------------------    Overnight events/patient complaints: feels well no dyspnea - tele unremarkable      No Known Allergies    MEDICATIONS  (STANDING):  allopurinol 100 milliGRAM(s) Oral daily  aMIOdarone    Tablet 100 milliGRAM(s) Oral daily  atorvastatin 20 milliGRAM(s) Oral at bedtime  cyanocobalamin 1000 MICROGram(s) Oral daily  epoetin esvin-epbx (RETACRIT) Injectable 98753 Unit(s) SubCutaneous every 7 days  ergocalciferol 75893 Unit(s) Oral every week  folic acid 1 milliGRAM(s) Oral daily  influenza  Vaccine (HIGH DOSE) 0.5 milliLiter(s) IntraMuscular once  metoprolol succinate ER 25 milliGRAM(s) Oral daily  nystatin Powder 1 Application(s) Topical two times a day  pantoprazole  Injectable 40 milliGRAM(s) IV Push every 12 hours  sodium bicarbonate 650 milliGRAM(s) Oral two times a day  sodium chloride 0.65% Nasal 1 Spray(s) Both Nostrils every 6 hours  tamsulosin 0.4 milliGRAM(s) Oral at bedtime    MEDICATIONS  (PRN):  acetaminophen     Tablet .. 650 milliGRAM(s) Oral every 6 hours PRN Temp greater or equal to 38C (100.4F), Mild Pain (1 - 3)  aluminum hydroxide/magnesium hydroxide/simethicone Suspension 30 milliLiter(s) Oral every 4 hours PRN Dyspepsia  melatonin 3 milliGRAM(s) Oral at bedtime PRN Insomnia  ondansetron Injectable 4 milliGRAM(s) IV Push every 8 hours PRN Nausea and/or Vomiting      Vital Signs Last 24 Hrs  T(C): 37.4 (31 Mar 2025 00:21), Max: 37.4 (31 Mar 2025 00:21)  T(F): 99.3 (31 Mar 2025 00:21), Max: 99.3 (31 Mar 2025 00:21)  HR: 64 (31 Mar 2025 00:21) (60 - 67)  BP: 103/62 (31 Mar 2025 00:21) (96/58 - 127/70)  BP(mean): --  RR: 18 (31 Mar 2025 00:21) (17 - 18)  SpO2: 95% (31 Mar 2025 00:21) (95% - 97%)    Parameters below as of 31 Mar 2025 00:21  Patient On (Oxygen Delivery Method): room air      Daily     Daily Weight in k.5 (30 Mar 2025 16:30)  ICU Vital Signs Last 24 Hrs  DEIRDRE MORRIS  I&O's Detail    30 Mar 2025 07:01  -  31 Mar 2025 07:00  --------------------------------------------------------  IN:    Oral Fluid: 720 mL  Total IN: 720 mL    OUT:    Voided (mL): 1275 mL  Total OUT: 1275 mL    Total NET: -555 mL        I&O's Summary    30 Mar 2025 07:01  -  31 Mar 2025 07:00  --------------------------------------------------------  IN: 720 mL / OUT: 1275 mL / NET: -555 mL      Drug Dosing Weight  DEIRDRE MORRIS      PHYSICAL EXAM:  General: Appears alert and cooperative.  HEENT: Head; normocephalic, atraumatic.  Eyes: Pupils reactive, cornea wnl.  Neck: Supple, no nodes adenopathy, no JVD or carotid bruit or thyromegaly.  CARDIOVASCULAR: Normal S1 and S2, 3/6 systolic murmur at base  LUNGS: No rales, rhonchi or wheeze. Normal breath sounds bilaterally.  ABDOMEN: Soft, nontender without mass or organomegaly. bowel sounds normoactive.  EXTREMITIES: No clubbing, cyanosis or edema. Distal pulses wnl.   SKIN: warm and dry with normal turgor.  NEURO: Alert/oriented x 3/normal motor exam. No pathologic reflexes.    PSYCH: normal affect.        LABS:                        7.3    9.02  )-----------( 126      ( 31 Mar 2025 04:40 )             22.8         143  |  108  |  59.2[H]  ----------------------------<  100[H]  3.9   |  19.0[L]  |  4.34[H]    Ca    7.7[L]      31 Mar 2025 04:40  Mg     2.2           DEIRDRE MORRIS        Urinalysis Basic - ( 31 Mar 2025 04:40 )    Color: x / Appearance: x / SG: x / pH: x  Gluc: 100 mg/dL / Ketone: x  / Bili: x / Urobili: x   Blood: x / Protein: x / Nitrite: x   Leuk Esterase: x / RBC: x / WBC x   Sq Epi: x / Non Sq Epi: x / Bacteria: x        RADIOLOGY & ADDITIONAL STUDIES:< from: Xray Chest 1 View- PORTABLE-Routine (25 @ 13:34) >    ACC: [id number] EXAM:  XR CHEST PORTABLE ROUTINE 1V   ORDERED BY: JOSE GABRIEL     PROCEDURE DATE:  2025          INTERPRETATION:  Chest one view    HISTORY: CHF    COMPARISON STUDY: Earlier the same day    Frontal expiratory view of the chest shows the heart to be similar in   size. Left cardiac fibrillator and aortic valve stent are again noted.    The lungs are clear and there is no evidence of pneumothorax nor pleural   effusion.    IMPRESSION:  No evidence of CHF.        < end of copied text >      INTERPRETATION OF TELEMETRY (personally reviewed):    ECG:< from: 12 Lead ECG (25 @ 15:52) >    Diagnosis Line Sinus bradycardia with 1st degree A-Vblock  Right bundle branch block , plus right ventricular hypertrophy  T wave abnormality, consider lateral ischemia  Left anterior fascicular block  Abnormal ECG    Confirmed by JEANIE MOODY ([id number]) on 3/26/2025 5:16:19 PM    < end of copied text >      ECHO:< from: TTE W or WO Ultrasound Enhancing Agent (25 @ 14:12) >  TRANSTHORACIC ECHOCARDIOGRAM REPORT  ________________________________________________________________________________                                      _______       Pt. Name:       DEIRDRE MORRIS Study Date:    3/26/2025  MRN:            [id number]          YOB: 1950  Accession #:    [id number]           Age:           74 years  Account#:       [id number]          Gender:        M  Heart Rate:                         Height:        67.72 in (172.00 cm)  Rhythm:                 Weight:        438.71 lb (199.00 kg)  Blood Pressure: 189/92 mmHg         BSA/BMI:       2.85 m² / 67.27 kg/m²  ________________________________________________________________________________________  Referring Physician: [id number] Gallegos  Primary Sonographer: Bonnie Jain    CPT:                ECHO TTE WITH CON COMP W DOPP - .m;DEFINITY ECHO                      CONTRAST PER ML - .m  Indication(s):      Abnormal electrocardiogram ECG EKG - R94.31  Procedure:          Transthoracic echocardiogram with 2-D, M-mode and complete                      spectral and color flow Doppler.  Ordering Location:  [facility]  Admission Status:   ED  Contrast Injection: Verbal consent was obtained for injection of Ultrasonic                      Enhancing Agent following a discussion of risks and                      benefits.                      Endocardial visualization enhanced with 2 ml of Definity                      Ultrasound enhancing agent (Lot#:[id number] Exp.Date:2026).    _______________________________________________________________________________________     CONCLUSIONS:      1. Left ventricular cavity is normal in size. Left ventricular wall thickness is normal. Left ventricular systolic function is severely decreased with an ejection fraction visually estimated at 25 to 30 %.   2. The left ventricular diastolic function is indeterminate.   3. Moderately enlarged right ventricular cavity size and moderately to severely reduced right ventricularsystolic function.   4. Left atrium is moderately dilated.   5. The right atrium is moderately dilated.   6. 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 No intravalvular or paravalvular regurgitation.   7. The peak transaortic velocity is 1.92 m/s, peak transaortic gradient is 14.7 mmHg and mean transaortic gradient is 7.0 mmHg with an LVOT/aortic valve VTI ratio of 0.40. The aortic valve acceleration time is 74 msec.   8. Mitral valve leaflets are diffusely calcified.   9. There is mild posterior calcification of the mitral valve annulus.  10. Moderate mitral regurgitation.  11. Moderate to severe tricuspid regurgitation.  12. Estimated pulmonary artery systolic pressure is 46 mmHg, mild pulmonary hypertension.  13. No pericardial effusion seen.    < end of copied text >      STRESS TEST:    CARDIAC CATHETERIZATION:    ASSESSMENT AND PLAN:  In summary, DEIRDRE MORRIS is an 74y Male with past medical history significant for

## 2025-03-31 NOTE — PROGRESS NOTE ADULT - ASSESSMENT
75 y/o M w/ a hx of HFrEF, Paroxysmal Afib on AC, CKD3b, HTN, HLD, BPH here for mgmt of Acute On Chronic HFrEF and Epistaxis    #Acute On Chronic HFrEF  - DC lasix   - resume demadex if NA and cr improves   - cardiology input appreciated     #Paroxysmal Atrial Fibrillation on AC  - Dc eliquis per cardio   - outpatient follow up for watchman   - cont amiodarone 100mg Q24  - plan for outpatient LAAO     #Epistaxis  - HOLD AC as above  - ENT consulted s/p rocket removal  - dc empiric Augmentin     #Anemia blood loss   - due to epistaxis   - melena likely due to above   - GI consulted no plan for EGD   - monitor HB stable   - s/p PRBC 3/26 overnight   - Hg dropped again today, 1 additional unit planned for 3/31    #ANGEL on CKD3b -- worsening   - hold diuretics   - caution w/ nephrotoxic agents  - BMP in am     #BPH  - flomax    #Skin rash fungal skin infection   - cont nystatin powder     Dispo: DC in few days likely home pending renal recovery and Hg stabilization   lives alone    73 y/o M w/ a hx of HFrEF, Paroxysmal Afib on AC, CKD3b, HTN, HLD, BPH here for mgmt of Acute On Chronic HFrEF and Epistaxis    #Acute On Chronic HFrEF  - DC lasix   - resume demadex if NA and cr improves   - cardiology input appreciated   - dc Meto, changed to Coreg BID    #Paroxysmal Atrial Fibrillation on AC  - Dc eliquis per cardio   - outpatient follow up for watchman   - cont amiodarone 100mg Q24  - plan for outpatient LAAO     #Epistaxis  - HOLD AC as above  - ENT consulted s/p rocket removal  - dc empiric Augmentin     #Anemia blood loss   - due to epistaxis   - melena likely due to above   - GI consulted no plan for EGD   - monitor HB stable   - s/p PRBC 3/26 overnight   - Hg dropped again today, 1 additional unit planned for 3/31    #ANGEL on CKD3b -- worsening   - hold diuretics   - caution w/ nephrotoxic agents  - BMP in am     #BPH  - flomax    #Skin rash fungal skin infection   - cont nystatin powder     Dispo: DC in few days likely home pending renal recovery and Hg stabilization   lives alone

## 2025-03-31 NOTE — PROGRESS NOTE ADULT - SUBJECTIVE AND OBJECTIVE BOX
NEPHROLOGY INTERVAL HPI/OVERNIGHT EVENTS:    Feels better   Diuretics on hold   Cardio follow up noted     MEDICATIONS  (STANDING):  allopurinol 100 milliGRAM(s) Oral daily  aMIOdarone    Tablet 100 milliGRAM(s) Oral daily  atorvastatin 20 milliGRAM(s) Oral at bedtime  carvedilol 3.125 milliGRAM(s) Oral every 12 hours  cyanocobalamin 1000 MICROGram(s) Oral daily  epoetin esvin-epbx (RETACRIT) Injectable 13381 Unit(s) SubCutaneous every 7 days  ergocalciferol 14292 Unit(s) Oral every week  folic acid 1 milliGRAM(s) Oral daily  influenza  Vaccine (HIGH DOSE) 0.5 milliLiter(s) IntraMuscular once  nystatin Powder 1 Application(s) Topical two times a day  pantoprazole  Injectable 40 milliGRAM(s) IV Push every 12 hours  sodium bicarbonate 650 milliGRAM(s) Oral two times a day  sodium chloride 0.65% Nasal 1 Spray(s) Both Nostrils every 6 hours  tamsulosin 0.4 milliGRAM(s) Oral at bedtime    MEDICATIONS  (PRN):  acetaminophen     Tablet .. 650 milliGRAM(s) Oral every 6 hours PRN Temp greater or equal to 38C (100.4F), Mild Pain (1 - 3)  aluminum hydroxide/magnesium hydroxide/simethicone Suspension 30 milliLiter(s) Oral every 4 hours PRN Dyspepsia  melatonin 3 milliGRAM(s) Oral at bedtime PRN Insomnia  ondansetron Injectable 4 milliGRAM(s) IV Push every 8 hours PRN Nausea and/or Vomiting      Allergies    No Known Allergies    Intolerances        Vital Signs Last 24 Hrs  T(C): 36.8 (31 Mar 2025 16:35), Max: 37.4 (31 Mar 2025 00:21)  T(F): 98.3 (31 Mar 2025 16:35), Max: 99.3 (31 Mar 2025 00:21)  HR: 86 (31 Mar 2025 17:27) (61 - 88)  BP: 150/65 (31 Mar 2025 17:27) (96/58 - 150/65)  BP(mean): --  RR: 18 (31 Mar 2025 16:35) (17 - 18)  SpO2: 96% (31 Mar 2025 16:35) (95% - 99%)    Parameters below as of 31 Mar 2025 16:35  Patient On (Oxygen Delivery Method): room air      Daily     Daily   I&O's Detail    30 Mar 2025 07:01  -  31 Mar 2025 07:00  --------------------------------------------------------  IN:    Oral Fluid: 720 mL  Total IN: 720 mL    OUT:    Voided (mL): 1275 mL  Total OUT: 1275 mL    Total NET: -555 mL      31 Mar 2025 07:01  -  31 Mar 2025 17:47  --------------------------------------------------------  IN:    Oral Fluid: 720 mL  Total IN: 720 mL    OUT:    Voided (mL): 800 mL  Total OUT: 800 mL    Total NET: -80 mL        I&O's Summary    30 Mar 2025 07:01  -  31 Mar 2025 07:00  --------------------------------------------------------  IN: 720 mL / OUT: 1275 mL / NET: -555 mL    31 Mar 2025 07:01  -  31 Mar 2025 17:47  --------------------------------------------------------  IN: 720 mL / OUT: 800 mL / NET: -80 mL      PHYSICAL EXAM:  GENERAL: Fatigued, weak  HEAD: L nasal tampon  NECK: Supple, No JVD  NERVOUS SYSTEM:  Alert & Oriented X3,   CHEST/LUNG: EAE, Improved   HEART: No rub  ABDOMEN: Soft, Nontender, +BS  EXTREMITIES:  + dependent edema - better     LABS:  PHYSICAL EXAM:    LABS:                        7.4    8.25  )-----------( 125      ( 31 Mar 2025 10:14 )             22.9     03-31    143  |  108  |  59.2[H]  ----------------------------<  100[H]  3.9   |  19.0[L]  |  4.34[H]    Ca    7.7[L]      31 Mar 2025 04:40  Mg     2.2     03-31        Urinalysis Basic - ( 31 Mar 2025 04:40 )    Color: x / Appearance: x / SG: x / pH: x  Gluc: 100 mg/dL / Ketone: x  / Bili: x / Urobili: x   Blood: x / Protein: x / Nitrite: x   Leuk Esterase: x / RBC: x / WBC x   Sq Epi: x / Non Sq Epi: x / Bacteria: x      Magnesium: 2.2 mg/dL (03-31 @ 04:40)          RADIOLOGY & ADDITIONAL TESTS:

## 2025-03-31 NOTE — PROGRESS NOTE ADULT - SUBJECTIVE AND OBJECTIVE BOX
ENT ISSUE/POD: Epistaxis    HPI:   Patient seen and examined at bedside, no new complaints. Hgb 7.3 today. Denies further epistaxis.       PAST MEDICAL & SURGICAL HISTORY:  Hypertension      CKD (chronic kidney disease)      Afib      HLD (hyperlipidemia)      Presence of stent of CABG        Allergies    No Known Allergies    Intolerances      MEDICATIONS  (STANDING):  allopurinol 100 milliGRAM(s) Oral daily  aMIOdarone    Tablet 100 milliGRAM(s) Oral daily  amoxicillin  875 milliGRAM(s)/clavulanate 1 Tablet(s) Oral two times a day  atorvastatin 20 milliGRAM(s) Oral at bedtime  cyanocobalamin 1000 MICROGram(s) Oral daily  epoetin esvin-epbx (RETACRIT) Injectable 79036 Unit(s) SubCutaneous every 7 days  ergocalciferol 97005 Unit(s) Oral every week  folic acid 1 milliGRAM(s) Oral daily  influenza  Vaccine (HIGH DOSE) 0.5 milliLiter(s) IntraMuscular once  metoprolol succinate ER 25 milliGRAM(s) Oral daily  nystatin Powder 1 Application(s) Topical two times a day  pantoprazole  Injectable 40 milliGRAM(s) IV Push every 12 hours  sodium bicarbonate 650 milliGRAM(s) Oral two times a day  tamsulosin 0.4 milliGRAM(s) Oral at bedtime    MEDICATIONS  (PRN):  acetaminophen     Tablet .. 650 milliGRAM(s) Oral every 6 hours PRN Temp greater or equal to 38C (100.4F), Mild Pain (1 - 3)  aluminum hydroxide/magnesium hydroxide/simethicone Suspension 30 milliLiter(s) Oral every 4 hours PRN Dyspepsia  melatonin 3 milliGRAM(s) Oral at bedtime PRN Insomnia  ondansetron Injectable 4 milliGRAM(s) IV Push every 8 hours PRN Nausea and/or Vomiting      Social History: see consult note    Family history: see consult note    ROS:   ENT: all negative except as noted in HPI   Pulm: denies SOB, cough, hemoptysis  Neuro: denies numbness/tingling, loss of sensation  Endo: denies heat/cold intolerance, excessive sweating      Vital Signs Last 24 Hrs  T(C): 37.4 (31 Mar 2025 00:21), Max: 37.4 (31 Mar 2025 00:21)  T(F): 99.3 (31 Mar 2025 00:21), Max: 99.3 (31 Mar 2025 00:21)  HR: 64 (31 Mar 2025 00:21) (60 - 75)  BP: 103/62 (31 Mar 2025 00:21) (96/58 - 131/72)  RR: 18 (31 Mar 2025 00:21) (17 - 18)  SpO2: 95% (31 Mar 2025 00:21) (92% - 97%)    Parameters below as of 31 Mar 2025 00:21  Patient On (Oxygen Delivery Method): room air                              7.3    9.02  )-----------( 126      ( 31 Mar 2025 04:40 )             22.8    03-31    143  |  108  |  59.2[H]  ----------------------------<  100[H]  3.9   |  19.0[L]  |  4.34[H]    Ca    7.7[L]      31 Mar 2025 04:40  Mg     2.2     03-31         PHYSICAL EXAM:  Gen: NAD  Skin: No rashes, bruises, or lesions  Head: Normocephalic, Atraumatic  Face: no edema, erythema, or fluctuance. Parotid glands soft without mass  Eyes: no scleral injection  Ears: external ears without deformity, drainage, discoloration   Nose: Left naris with rhino rocket intact, right naris without discharge   Mouth: No Stridor / Drooling / Trismus.  Mucosa moist, tongue/uvula midline, oropharynx clear  Neck: Flat, supple, no lymphadenopathy, trachea midline, no masses  Lymphatic: No lymphadenopathy  Resp: breathing easily, no stridor  Neuro: facial nerve intact, no facial droop

## 2025-03-31 NOTE — PROVIDER CONTACT NOTE (OTHER) - ACTION/TREATMENT ORDERED:
As per Dr Greene, CBC will be rechecked tomorrow morning.
Niru hernandez
Dr Jenkins aware and to bedside to further assess proper treatment
type and screen, stat labs, GI consult, monitor I&Os.
MK Bryan made aware. Pending AM lab results.
MK Bryan notified, stat CBC ordered.
MK Rivas notified. Will let PA know if pt has another bloody BM or if there is a change in VS. CBC ordered with AM labs.

## 2025-03-31 NOTE — CHART NOTE - NSCHARTNOTEFT_GEN_A_CORE
Patient continues on bipap; tolerating well.   Will continue to monitor patients respiratory status Source: Patient, chart, staff     Current Diet: Diet, DASH/TLC:   Sodium & Cholesterol Restricted (03-28-25 @ 08:36)    PO intake: %      Source for PO intake: Patient, staff, chart     Current Weight:   3/27 243 lbs  3/28 235.4 lbs  3/29 240.7 lbs  3/30 243.6 lbs    Left/right leg 3+ edema     Pertinent Medications: MEDICATIONS  (STANDING):  allopurinol 100 milliGRAM(s) Oral daily  aMIOdarone    Tablet 100 milliGRAM(s) Oral daily  carvedilol 3.125 milliGRAM(s) Oral every 12 hours  cyanocobalamin 1000 MICROGram(s) Oral daily  epoetin esvin-epbx (RETACRIT) Injectable 19788 Unit(s) SubCutaneous every 7 days  ergocalciferol 30956 Unit(s) Oral every week  folic acid 1 milliGRAM(s) Oral daily  pantoprazole  Injectable 40 milliGRAM(s) IV Push every 12 hours  sodium bicarbonate 650 milliGRAM(s) Oral two times a day    MEDICATIONS  (PRN):  ondansetron Injectable 4 milliGRAM(s) IV Push every 8 hours PRN Nausea and/or Vomiting    Pertinent Labs: 03-31 Na143 mmol/L Glu 100 mg/dL[H] K+ 3.9 mmol/L Cr  4.34 mg/dL[H] BUN 59.2 mg/dL[H]     Estimated Needs:   1318-7034 kcal (18-23 kcal/kg 110.2kg)  110-132g protein (1-1.2g/kg 110.2kg)    Hospital Course: Per chart "75 y/o M w/ a hx of HFrEF, Paroxysmal Afib on AC, CKD3b, HTN, HLD, BPH here for mgmt of Acute On Chronic HFrEF and Epistaxis."    Current Nutrition Diagnosis: Increased Nutrient Needs (protein) related to increased physiological demand for healing as evidenced by CHF.    Patient tolerating current diet well with good appetite/PO intake. Pt reports eating 75% of lunch today and 100% of breakfast. Pt states PTA his appetite was less than it is now, but was still eating well. Reports eating 3 meals/day and follows a no added salt diet. Education on heart failure diet reinforced with pt and paperwork provided. Pt with no questions at this time. Pt with no c/o N/V/C/D at this time, reports his last BM was x2 days ago, but it is normal for him to not go every day. Pt states his UBW is about 240 lbs and has been stable. Current weight 244 lbs, severe edema noted which may influence weights. RD contact information provided for further nutrition-related questions or concerns. Will continue to monitor and follow up as needed. RD remains available.     Recommendations:   1) Continue diet as tolerated.   2) Encourage po intake, monitor diet tolerance, and provide assistance at meals as needed.   3) Rx: MVI daily.   4) Obtain daily weights to monitor trends.     Monitoring and Evaluation:   [x] PO intake [x] Tolerance to diet prescription [X] Weights  [X] Follow up per protocol [X] Labs: chem 8

## 2025-03-31 NOTE — PROGRESS NOTE ADULT - PROBLEM SELECTOR PLAN 1
- Rapid rhino discontinued today, 3/31   - May discontinue empiric antibiotics   - Please keep saline spray at bedside and encourage frequent use -  2 sprays to both nares at least 4 times a day  - Strict blood pressure control  - Avoid nasal trauma - no nose rubbing, blowing, avoid bending with head blow the waist, no heavy lifting. Sneeze with mouth open and pinching nares.

## 2025-03-31 NOTE — PROGRESS NOTE ADULT - ASSESSMENT
CKD(IIIb): Screat 2.7mg/dL in recent past  Now ANGEL in setting of decompensated CHF/diuretics and poor BP control vs progression of disease vs both  - avoid potential nephrotoxins  - renal sono w/o hydro 3/28   - Resp status improved ---> Lasix held   - Component of elevated BUN due to Epistaxis   - Follow bladder scan     HTN - Watch on meds     Anemia: +CKD + GI loss  - Iron stores OK   - added Retacrit and folate   - PRBCs as needed  - GI eval    RO:  -  Vit D 28 and iPTH 244  - Added weekly ergo

## 2025-03-31 NOTE — PROGRESS NOTE ADULT - SUBJECTIVE AND OBJECTIVE BOX
Brigida Greene M.D.    Patient is a 74y old  Male who presents with a chief complaint of Acute On Chronic HFrEF (31 Mar 2025 09:52)      SUBJECTIVE / OVERNIGHT EVENTS: no event overnight     Patient denies chest pain, SOB, abd pain, N/V, fever, chills, dysuria or any other complaints. All remainder ROS negative.     MEDICATIONS  (STANDING):  allopurinol 100 milliGRAM(s) Oral daily  aMIOdarone    Tablet 100 milliGRAM(s) Oral daily  atorvastatin 20 milliGRAM(s) Oral at bedtime  carvedilol 3.125 milliGRAM(s) Oral every 12 hours  cyanocobalamin 1000 MICROGram(s) Oral daily  epoetin esvin-epbx (RETACRIT) Injectable 09692 Unit(s) SubCutaneous every 7 days  ergocalciferol 28304 Unit(s) Oral every week  folic acid 1 milliGRAM(s) Oral daily  furosemide   Injectable 40 milliGRAM(s) IV Push once  influenza  Vaccine (HIGH DOSE) 0.5 milliLiter(s) IntraMuscular once  nystatin Powder 1 Application(s) Topical two times a day  pantoprazole  Injectable 40 milliGRAM(s) IV Push every 12 hours  sodium bicarbonate 650 milliGRAM(s) Oral two times a day  sodium chloride 0.65% Nasal 1 Spray(s) Both Nostrils every 6 hours  tamsulosin 0.4 milliGRAM(s) Oral at bedtime    MEDICATIONS  (PRN):  acetaminophen     Tablet .. 650 milliGRAM(s) Oral every 6 hours PRN Temp greater or equal to 38C (100.4F), Mild Pain (1 - 3)  aluminum hydroxide/magnesium hydroxide/simethicone Suspension 30 milliLiter(s) Oral every 4 hours PRN Dyspepsia  melatonin 3 milliGRAM(s) Oral at bedtime PRN Insomnia  ondansetron Injectable 4 milliGRAM(s) IV Push every 8 hours PRN Nausea and/or Vomiting      I&O's Summary    30 Mar 2025 07:01  -  31 Mar 2025 07:00  --------------------------------------------------------  IN: 720 mL / OUT: 1275 mL / NET: -555 mL    31 Mar 2025 07:01  -  31 Mar 2025 11:17  --------------------------------------------------------  IN: 240 mL / OUT: 400 mL / NET: -160 mL        PHYSICAL EXAM:  Vital Signs Last 24 Hrs  T(C): 37.2 (31 Mar 2025 11:11), Max: 37.4 (31 Mar 2025 00:21)  T(F): 99 (31 Mar 2025 11:11), Max: 99.3 (31 Mar 2025 00:21)  HR: 76 (31 Mar 2025 11:11) (60 - 76)  BP: 112/61 (31 Mar 2025 11:11) (96/58 - 127/70)  BP(mean): --  RR: 18 (31 Mar 2025 11:11) (17 - 18)  SpO2: 96% (31 Mar 2025 11:11) (95% - 97%)    Parameters below as of 31 Mar 2025 11:11  Patient On (Oxygen Delivery Method): room air        CONSTITUTIONAL: NAD, well-groomed  RESPIRATORY: Normal respiratory effort; mild crackles noted  CARDIOVASCULAR: Regular rate and rhythm, no LE edema  ABDOMEN: Nontender to palpation, normoactive bowel sounds      LABS:                        7.4    8.25  )-----------( 125      ( 31 Mar 2025 10:14 )             22.9     03-31    143  |  108  |  59.2[H]  ----------------------------<  100[H]  3.9   |  19.0[L]  |  4.34[H]    Ca    7.7[L]      31 Mar 2025 04:40  Mg     2.2     03-31            Urinalysis Basic - ( 31 Mar 2025 04:40 )    Color: x / Appearance: x / SG: x / pH: x  Gluc: 100 mg/dL / Ketone: x  / Bili: x / Urobili: x   Blood: x / Protein: x / Nitrite: x   Leuk Esterase: x / RBC: x / WBC x   Sq Epi: x / Non Sq Epi: x / Bacteria: x        CAPILLARY BLOOD GLUCOSE          RADIOLOGY & ADDITIONAL TESTS:  Results Reviewed:   Imaging Personally Reviewed:  Electrocardiogram Personally Reviewed:

## 2025-03-31 NOTE — PROVIDER CONTACT NOTE (OTHER) - REASON
new onset bleeding
Patient completed blood transfusion
Patient with large black loose bowel movement
Pt with liquid, bloody BM
Patient with MAD to b/l abdominal pannus and R groin
Pt with bloody BM
Pt with large liquid bloody BM

## 2025-03-31 NOTE — PROGRESS NOTE ADULT - ASSESSMENT
Assessment  HFrEF NYHA Class III Stage C with initial volume overload ( EF 25-30% declined since 2024) resolved  CKD with worsening renal function not on HD   Ischemic CM s/p remote CABG/post CABG PCI most recently 2021  Moderate functional MR  s/p TAVR working well  PAF in SR on amiodarone  epistaxis and hematochezia  BiV ICD in place  anemia      Rec  Cont tele  Dc eliquis - pt not candidate - will consider outpt eval for LAAO  cont amio/  low dose asa  Switch toprol to Coreg 3.125 BID  Hold diuretics for now  Pt not candidate for repeat cath at this point given woresning renal function - cont GDMT  consider addling low dose hydralazine to coreg  consider transfuse Hgb > 8

## 2025-03-31 NOTE — PROGRESS NOTE ADULT - ASSESSMENT
74M with PMHx of Paroxysmal Afib, HFrEF, CKD3b, HTN, HLD, BPH presented to [facility] with epistaxis and cough. Admitted for acute on chronic HF. ENT consulted for L sided epistaxis now controlled since placement of L rapid rhino 3/26. Rapid rhino removed 3/31 without further epistaxis.

## 2025-04-01 DIAGNOSIS — N17.9 ACUTE KIDNEY FAILURE, UNSPECIFIED: ICD-10-CM

## 2025-04-01 DIAGNOSIS — Z71.89 OTHER SPECIFIED COUNSELING: ICD-10-CM

## 2025-04-01 DIAGNOSIS — Z51.5 ENCOUNTER FOR PALLIATIVE CARE: ICD-10-CM

## 2025-04-01 DIAGNOSIS — I50.23 ACUTE ON CHRONIC SYSTOLIC (CONGESTIVE) HEART FAILURE: ICD-10-CM

## 2025-04-01 LAB
ANION GAP SERPL CALC-SCNC: 14 MMOL/L — SIGNIFICANT CHANGE UP (ref 5–17)
BUN SERPL-MCNC: 59.1 MG/DL — HIGH (ref 8–20)
CALCIUM SERPL-MCNC: 7.5 MG/DL — LOW (ref 8.4–10.5)
CHLORIDE SERPL-SCNC: 111 MMOL/L — HIGH (ref 96–108)
CO2 SERPL-SCNC: 20 MMOL/L — LOW (ref 22–29)
CREAT SERPL-MCNC: 4.57 MG/DL — HIGH (ref 0.5–1.3)
EGFR: 13 ML/MIN/1.73M2 — LOW
EGFR: 13 ML/MIN/1.73M2 — LOW
GLUCOSE SERPL-MCNC: 129 MG/DL — HIGH (ref 70–99)
HCT VFR BLD CALC: 24.5 % — LOW (ref 39–50)
HGB BLD-MCNC: 7.8 G/DL — LOW (ref 13–17)
MAGNESIUM SERPL-MCNC: 2.3 MG/DL — SIGNIFICANT CHANGE UP (ref 1.6–2.6)
MCHC RBC-ENTMCNC: 31.8 G/DL — LOW (ref 32–36)
MCHC RBC-ENTMCNC: 32.6 PG — SIGNIFICANT CHANGE UP (ref 27–34)
MCV RBC AUTO: 102.5 FL — HIGH (ref 80–100)
NRBC # BLD AUTO: 0 K/UL — SIGNIFICANT CHANGE UP (ref 0–0)
NRBC # FLD: 0 K/UL — SIGNIFICANT CHANGE UP (ref 0–0)
NRBC BLD AUTO-RTO: 0 /100 WBCS — SIGNIFICANT CHANGE UP (ref 0–0)
PHOSPHATE SERPL-MCNC: 3.9 MG/DL — SIGNIFICANT CHANGE UP (ref 2.4–4.7)
PLATELET # BLD AUTO: 134 K/UL — LOW (ref 150–400)
PMV BLD: 10.7 FL — SIGNIFICANT CHANGE UP (ref 7–13)
POTASSIUM SERPL-MCNC: 3.5 MMOL/L — SIGNIFICANT CHANGE UP (ref 3.5–5.3)
POTASSIUM SERPL-SCNC: 3.5 MMOL/L — SIGNIFICANT CHANGE UP (ref 3.5–5.3)
RBC # BLD: 2.39 M/UL — LOW (ref 4.2–5.8)
RBC # FLD: 16.4 % — HIGH (ref 10.3–14.5)
SODIUM SERPL-SCNC: 145 MMOL/L — SIGNIFICANT CHANGE UP (ref 135–145)
WBC # BLD: 6.78 K/UL — SIGNIFICANT CHANGE UP (ref 3.8–10.5)
WBC # FLD AUTO: 6.78 K/UL — SIGNIFICANT CHANGE UP (ref 3.8–10.5)

## 2025-04-01 PROCEDURE — 99231 SBSQ HOSP IP/OBS SF/LOW 25: CPT

## 2025-04-01 PROCEDURE — 99223 1ST HOSP IP/OBS HIGH 75: CPT

## 2025-04-01 PROCEDURE — 99497 ADVNCD CARE PLAN 30 MIN: CPT | Mod: 25

## 2025-04-01 PROCEDURE — 99232 SBSQ HOSP IP/OBS MODERATE 35: CPT

## 2025-04-01 RX ORDER — BISACODYL 5 MG
10 TABLET, DELAYED RELEASE (ENTERIC COATED) ORAL DAILY
Refills: 0 | Status: DISCONTINUED | OUTPATIENT
Start: 2025-04-01 | End: 2025-04-04

## 2025-04-01 RX ORDER — SENNA 187 MG
2 TABLET ORAL AT BEDTIME
Refills: 0 | Status: DISCONTINUED | OUTPATIENT
Start: 2025-04-01 | End: 2025-04-04

## 2025-04-01 RX ORDER — POLYETHYLENE GLYCOL 3350 17 G/17G
17 POWDER, FOR SOLUTION ORAL DAILY
Refills: 0 | Status: DISCONTINUED | OUTPATIENT
Start: 2025-04-01 | End: 2025-04-04

## 2025-04-01 RX ADMIN — CYANOCOBALAMIN 1000 MICROGRAM(S): 1000 INJECTION INTRAMUSCULAR; SUBCUTANEOUS at 11:33

## 2025-04-01 RX ADMIN — Medication 2 TABLET(S): at 21:34

## 2025-04-01 RX ADMIN — Medication 650 MILLIGRAM(S): at 17:33

## 2025-04-01 RX ADMIN — Medication 1 SPRAY(S): at 11:33

## 2025-04-01 RX ADMIN — TAMSULOSIN HYDROCHLORIDE 0.4 MILLIGRAM(S): 0.4 CAPSULE ORAL at 21:34

## 2025-04-01 RX ADMIN — ATORVASTATIN CALCIUM 20 MILLIGRAM(S): 80 TABLET, FILM COATED ORAL at 21:34

## 2025-04-01 RX ADMIN — Medication 40 MILLIGRAM(S): at 17:33

## 2025-04-01 RX ADMIN — NYSTATIN 1 APPLICATION(S): 100000 CREAM TOPICAL at 06:12

## 2025-04-01 RX ADMIN — Medication 100 MILLIGRAM(S): at 11:32

## 2025-04-01 RX ADMIN — AMIODARONE HYDROCHLORIDE 100 MILLIGRAM(S): 50 INJECTION, SOLUTION INTRAVENOUS at 05:28

## 2025-04-01 RX ADMIN — FOLIC ACID 1 MILLIGRAM(S): 1 TABLET ORAL at 11:32

## 2025-04-01 RX ADMIN — POLYETHYLENE GLYCOL 3350 17 GRAM(S): 17 POWDER, FOR SOLUTION ORAL at 11:32

## 2025-04-01 RX ADMIN — Medication 1 SPRAY(S): at 02:05

## 2025-04-01 RX ADMIN — Medication 1 SPRAY(S): at 17:34

## 2025-04-01 RX ADMIN — Medication 40 MILLIGRAM(S): at 05:30

## 2025-04-01 RX ADMIN — NYSTATIN 1 APPLICATION(S): 100000 CREAM TOPICAL at 17:33

## 2025-04-01 RX ADMIN — Medication 1 SPRAY(S): at 05:27

## 2025-04-01 RX ADMIN — Medication 650 MILLIGRAM(S): at 05:26

## 2025-04-01 NOTE — CONSULT NOTE ADULT - TIME BILLING
Total Time Spent 75 minutes  This include chart review, patient assessment, discussion and collaboration with interdisciplinary team members, C planning with brother Nii, and documentation.    COUNSELING:  Face to face meeting to discuss Advanced Care Planning - Time Spent 30 Minutes    Thank you for the opportunity to assist with the care of this patient.   [facility] Palliative Medicine Consult Service [phone]

## 2025-04-01 NOTE — PROGRESS NOTE ADULT - SUBJECTIVE AND OBJECTIVE BOX
[facility]                                      [facility]                              [address] [place] [address]                                                 PHONE: [phone]                                                 FAX: [phone]  -----------------------------------------------------------------------------------------------  Pt seen and examined. FU for  shortness of breath     Overnight events/Complaints: Pt reports breathing better.     RELEVANT PHYSICAL EXAM:  Neck: No obvious JVD  Cardiovascular: regular S1, S2  Respiratory: Lungs clear to auscultation; no crepitations, no wheeze  Musculoskeletal: No edema    LABS:                        7.8    6.78  )-----------( 134      ( 01 Apr 2025 06:00 )             24.5     04-01    145  |  111[H]  |  59.1[H]  ----------------------------<  129[H]  3.5   |  20.0[L]  |  4.57[H]    Ca    7.5[L]      01 Apr 2025 06:00  Phos  3.9     04-01  Mg     2.3     04-01    Vital Signs Last 24 Hrs  T(C): 37.1 (01 Apr 2025 08:02), Max: 37.2 (31 Mar 2025 11:25)  T(F): 98.8 (01 Apr 2025 08:02), Max: 99 (31 Mar 2025 11:25)  HR: 61 (01 Apr 2025 08:02) (61 - 88)  BP: 106/60 (01 Apr 2025 08:02) (106/60 - 150/65)  BP(mean): --  RR: 18 (01 Apr 2025 08:02) (17 - 18)  SpO2: 98% (01 Apr 2025 08:02) (96% - 99%)    Parameters below as of 01 Apr 2025 08:02  Patient On (Oxygen Delivery Method): room air      I&O's Summary    31 Mar 2025 07:01  -  01 Apr 2025 07:00  --------------------------------------------------------  IN: 720 mL / OUT: 1670 mL / NET: -950 mL    RADIOLOGY & ADDITIONAL STUDIES: (reviewed)  CXR was independently visualized/reviewed  and demonstrated: No evidence of CHF.    CARDIOLOGY TESTING:(reviewed)     TELEMETRY independently visualized/reviewed and demonstrated :     ECHO: 8/2024: LVEF 50-55%, mild MR, normal TAVR gradients    Echo:   1. Left ventricular cavity is normal in size. Left ventricular wall thickness is normal. Left ventricular systolic function is severely decreased with an ejection fraction visually estimated at 25 to 30 %.   2. The left ventricular diastolic function is indeterminate.   3. Moderately enlarged right ventricular cavity size and moderately to severely reduced right ventricular systolic function.   4. Left atrium is moderately dilated.   5. The right atrium is moderately dilated.   6. 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 No intravalvular or paravalvular regurgitation.   7. The peak transaortic velocity is 1.92 m/s, peak transaortic gradient is 14.7 mmHg and mean transaortic gradient is 7.0 mmHg with an LVOT/aortic valve VTI ratio of 0.40. The aortic valve acceleration time is 74 msec.   8. Mitral valve leaflets are diffusely calcified.   9. There is mild posterior calcification of the mitral valve annulus.  10. Moderate mitral regurgitation.  11. Moderate to severe tricuspid regurgitation.  12. Estimated pulmonary artery systolic pressure is 46 mmHg, mild pulmonary hypertension.  13. No pericardial effusion seen.    STRESS TEST: office 7/2024 PET: moderate anteroapical ischemia EF 45%      MEDICATIONS:(reviewed)    MEDICATIONS  (PRN):  acetaminophen     Tablet .. 650 milliGRAM(s) Oral every 6 hours PRN Temp greater or equal to 38C (100.4F), Mild Pain (1 - 3)  aluminum hydroxide/magnesium hydroxide/simethicone Suspension 30 milliLiter(s) Oral every 4 hours PRN Dyspepsia  bisacodyl Suppository 10 milliGRAM(s) Rectal daily PRN Constipation  melatonin 3 milliGRAM(s) Oral at bedtime PRN Insomnia  ondansetron Injectable 4 milliGRAM(s) IV Push every 8 hours PRN Nausea and/or Vomiting      MEDICATIONS  (STANDING):  allopurinol 100 milliGRAM(s) Oral daily  aMIOdarone    Tablet 100 milliGRAM(s) Oral daily  atorvastatin 20 milliGRAM(s) Oral at bedtime  carvedilol 3.125 milliGRAM(s) Oral every 12 hours  cyanocobalamin 1000 MICROGram(s) Oral daily  epoetin esvin-epbx (RETACRIT) Injectable 87736 Unit(s) SubCutaneous every 7 days  ergocalciferol 06882 Unit(s) Oral every week  folic acid 1 milliGRAM(s) Oral daily  influenza  Vaccine (HIGH DOSE) 0.5 milliLiter(s) IntraMuscular once  nystatin Powder 1 Application(s) Topical two times a day  pantoprazole  Injectable 40 milliGRAM(s) IV Push every 12 hours  polyethylene glycol 3350 17 Gram(s) Oral daily  senna 2 Tablet(s) Oral at bedtime  sodium bicarbonate 650 milliGRAM(s) Oral two times a day  sodium chloride 0.65% Nasal 1 Spray(s) Both Nostrils every 6 hours  tamsulosin 0.4 milliGRAM(s) Oral at bedtime    ASSESSMENT AND PLAN:    74y Male with  HTN HLD CKD baseline creat 3.6 CAD s/p CABG X 4 2008, initial LV dysfunction s/p ICD , post CABG PCI (RCA 2020, M 2021) s/p TAVR 2021 PAF s/p DCCV 2022, BIV ICD upgrade 2023 augmented EF 55% by echo 8/2024, last seen in ofice 9/2024 clinically well compensated on GDMT.   Came to Er c/o cough/epistaxis and dyspnea. Pt reports several days of increasing SANDERS orthopnea and awakened by nonproductive cough.  In addition to epistaxis pt reports bloody bowel movement earlier today, chronic gait instability.  No evidence of chest pain      Dc eliquis - pt not candidate - will consider outpt eval for LAAO  cont amio and low dose asa  continue Coreg 3.125 BID  Hold diuretics for now  Pt not candidate for repeat cath at this point given woresning renal function - cont GDMT  Being transfused

## 2025-04-01 NOTE — PROGRESS NOTE ADULT - SUBJECTIVE AND OBJECTIVE BOX
NEPHROLOGY INTERVAL HPI/OVERNIGHT EVENTS:    Feels better   No complaints   Off Diuretics   No SOB or CP   UO 1.6 L     MEDICATIONS  (STANDING):  allopurinol 100 milliGRAM(s) Oral daily  aMIOdarone    Tablet 100 milliGRAM(s) Oral daily  atorvastatin 20 milliGRAM(s) Oral at bedtime  carvedilol 3.125 milliGRAM(s) Oral every 12 hours  cyanocobalamin 1000 MICROGram(s) Oral daily  epoetin esvin-epbx (RETACRIT) Injectable 92611 Unit(s) SubCutaneous every 7 days  ergocalciferol 48638 Unit(s) Oral every week  folic acid 1 milliGRAM(s) Oral daily  influenza  Vaccine (HIGH DOSE) 0.5 milliLiter(s) IntraMuscular once  nystatin Powder 1 Application(s) Topical two times a day  pantoprazole  Injectable 40 milliGRAM(s) IV Push every 12 hours  polyethylene glycol 3350 17 Gram(s) Oral daily  senna 2 Tablet(s) Oral at bedtime  sodium bicarbonate 650 milliGRAM(s) Oral two times a day  sodium chloride 0.65% Nasal 1 Spray(s) Both Nostrils every 6 hours  tamsulosin 0.4 milliGRAM(s) Oral at bedtime    MEDICATIONS  (PRN):  acetaminophen     Tablet .. 650 milliGRAM(s) Oral every 6 hours PRN Temp greater or equal to 38C (100.4F), Mild Pain (1 - 3)  aluminum hydroxide/magnesium hydroxide/simethicone Suspension 30 milliLiter(s) Oral every 4 hours PRN Dyspepsia  bisacodyl Suppository 10 milliGRAM(s) Rectal daily PRN Constipation  melatonin 3 milliGRAM(s) Oral at bedtime PRN Insomnia  ondansetron Injectable 4 milliGRAM(s) IV Push every 8 hours PRN Nausea and/or Vomiting      Allergies    No Known Allergies    Intolerances        Vital Signs Last 24 Hrs  T(C): 37.2 (2025 11:15), Max: 37.2 (31 Mar 2025 19:43)  T(F): 99 (2025 11:15), Max: 99 (31 Mar 2025 19:43)  HR: 60 (2025 11:15) (60 - 86)  BP: 126/57 (2025 11:15) (106/60 - 150/65)  BP(mean): --  RR: 18 (2025 11:15) (17 - 18)  SpO2: 98% (2025 11:15) (96% - 99%)    Parameters below as of 2025 11:15  Patient On (Oxygen Delivery Method): room air      Daily     Daily Weight in k.1 (31 Mar 2025 16:30)  I&O's Detail    31 Mar 2025 07:01  -  2025 07:00  --------------------------------------------------------  IN:    Oral Fluid: 720 mL  Total IN: 720 mL    OUT:    Voided (mL): 1670 mL  Total OUT: 1670 mL    Total NET: -950 mL        I&O's Summary    31 Mar 2025 07:01  -  2025 07:00  --------------------------------------------------------  IN: 720 mL / OUT: 1670 mL / NET: -950 mL              PHYSICAL EXAM:  GENERAL: Fatigued, weak  HEAD: L nasal tampon  NECK: Supple, No JVD  NERVOUS SYSTEM:  Alert & Oriented X3,   CHEST/LUNG: EAE, Improved   HEART: No rub  ABDOMEN: Soft, Nontender, +BS  EXTREMITIES:  + dependent edema - better   LABS:                        7.8    6.78  )-----------( 134      ( 2025 06:00 )             24.5     04-01    145  |  111[H]  |  59.1[H]  ----------------------------<  129[H]  3.5   |  20.0[L]  |  4.57[H]    Ca    7.5[L]      2025 06:00  Phos  3.9     04-01  Mg     2.3     04-01        Urinalysis Basic - ( 2025 06:00 )    Color: x / Appearance: x / SG: x / pH: x  Gluc: 129 mg/dL / Ketone: x  / Bili: x / Urobili: x   Blood: x / Protein: x / Nitrite: x   Leuk Esterase: x / RBC: x / WBC x   Sq Epi: x / Non Sq Epi: x / Bacteria: x      Magnesium: 2.3 mg/dL ( @ 06:00)  Phosphorus: 3.9 mg/dL ( @ 06:00)          RADIOLOGY & ADDITIONAL TESTS:

## 2025-04-01 NOTE — CONSULT NOTE ADULT - ASSESSMENT
73 y/o M w/ a hx of CAD s/p CABG X 4 2008, initial LV dysfunction s/p ICD , post CABG PCI (RCA 2020, M 2021) s/p TAVR 2021 PAF s/p DCCV 2022, BIV ICD upgrade 2023 augmented EF 55% by echo 8/2024 (Last EF 25-30%), Paroxysmal Afib on AC, CKD3b, HTN, HLD, BPH presented with epistaxis and cough and dyspnea Patient was admitted for acute on chronic HFrEF and epistaxis. s/p 2U of pRBC transfused. Palliative was seeing this patient after identified on the advanced illness list (CHF) as a patient with a readmission to the hospital.    #Encounter for Palliative Care  #Advance Care Planning  - Patient has capacity to make medical decisions  - Completed HCP prior to admission, awaiting for copy of HCP  - HCP is brother Nii Dias [phone]  - Full code, continue medical management  - Goal is clear - Patient wanted a trial of aggressive medical intervention (e.g., trial of dialysis, continue active defibrillator, full code). Encourage HCP to continue having GOC conversation with patient.     No further recommendations from a palliative standpoint. Will sign off. Please reconsult PRN if goals of care should change and assistance needed in further collaborative family discussions. Dispo planning per SW. Ongoing medical management per primary team.     #Anemia blood loss   #Epistaxis   - As per primary team  - s/p 2U RBC blood transfusion. Plan for 1/2 unit on 4/1  - GI consulted, no endoscopic intervention  - Monitor CBC, transfuse PRN     #ANGEL on CKD  - Nephro is on board, recs appreciated.   - Avoid potential nephrotoxins  - Component of elevated BUN due to Epistaxis  - Bladder scan  - Retacrit and folate   - Vitamin D     #Acute on Chronic HFrEF  #EF 25-30%  - DC Eliquis  - Outpatient evaluation for LAAO  - Continue Amio, ASA, Coreg  - Not a candidate for repeat cath

## 2025-04-01 NOTE — PROVIDER CONTACT NOTE (OTHER) - SITUATION
Pt with bloody BM
Dr Greene made aware of completed blood transfusion. Requested CBC to be ordered to monitor hgb.
Notified by monitor tech Patient's had 13 beats of non sustaining vtach . Patient asymtomatic. Vital signs stable. MK Wilson made aware. Will continue to monitor patient.
2 RN skincheck performed with NEENA Packer, patient with MAD to b/l abdominal pannus and R groin
Patient to bedside arvind, RN notified of large black loose black movement, pt with suspected GI bleed, denies symptoms, VSS
Pt had an episode of diarrhea with dark brown/reddish stools. no reported GI bleed. pt cleaning up in bathroom with an episode of symptomatic hypotension. Vitals upon recheck were back to baseline.
Pt with large bloody BM
Pt with liquid, bloody BM.

## 2025-04-01 NOTE — PROGRESS NOTE ADULT - SUBJECTIVE AND OBJECTIVE BOX
Brigida Greene M.D.    Patient is a 74y old  Male who presents with a chief complaint of Acute On Chronic HFrEF (01 Apr 2025 12:01)      SUBJECTIVE / OVERNIGHT EVENTS: no event overnight. Still no BM.    Patient denies chest pain, SOB, abd pain, N/V, fever, chills, dysuria or any other complaints. All remainder ROS negative.     MEDICATIONS  (STANDING):  allopurinol 100 milliGRAM(s) Oral daily  aMIOdarone    Tablet 100 milliGRAM(s) Oral daily  atorvastatin 20 milliGRAM(s) Oral at bedtime  carvedilol 3.125 milliGRAM(s) Oral every 12 hours  cyanocobalamin 1000 MICROGram(s) Oral daily  epoetin esvin-epbx (RETACRIT) Injectable 68627 Unit(s) SubCutaneous every 7 days  ergocalciferol 34172 Unit(s) Oral every week  folic acid 1 milliGRAM(s) Oral daily  influenza  Vaccine (HIGH DOSE) 0.5 milliLiter(s) IntraMuscular once  nystatin Powder 1 Application(s) Topical two times a day  pantoprazole  Injectable 40 milliGRAM(s) IV Push every 12 hours  polyethylene glycol 3350 17 Gram(s) Oral daily  senna 2 Tablet(s) Oral at bedtime  sodium bicarbonate 650 milliGRAM(s) Oral two times a day  sodium chloride 0.65% Nasal 1 Spray(s) Both Nostrils every 6 hours  tamsulosin 0.4 milliGRAM(s) Oral at bedtime    MEDICATIONS  (PRN):  acetaminophen     Tablet .. 650 milliGRAM(s) Oral every 6 hours PRN Temp greater or equal to 38C (100.4F), Mild Pain (1 - 3)  aluminum hydroxide/magnesium hydroxide/simethicone Suspension 30 milliLiter(s) Oral every 4 hours PRN Dyspepsia  bisacodyl Suppository 10 milliGRAM(s) Rectal daily PRN Constipation  melatonin 3 milliGRAM(s) Oral at bedtime PRN Insomnia  ondansetron Injectable 4 milliGRAM(s) IV Push every 8 hours PRN Nausea and/or Vomiting      I&O's Summary    31 Mar 2025 07:01  -  01 Apr 2025 07:00  --------------------------------------------------------  IN: 720 mL / OUT: 1670 mL / NET: -950 mL        PHYSICAL EXAM:  Vital Signs Last 24 Hrs  T(C): 37.2 (01 Apr 2025 11:15), Max: 37.2 (31 Mar 2025 19:43)  T(F): 99 (01 Apr 2025 11:15), Max: 99 (31 Mar 2025 19:43)  HR: 60 (01 Apr 2025 11:15) (60 - 86)  BP: 126/57 (01 Apr 2025 11:15) (106/60 - 150/65)  BP(mean): --  RR: 18 (01 Apr 2025 11:15) (17 - 18)  SpO2: 98% (01 Apr 2025 11:15) (96% - 99%)    Parameters below as of 01 Apr 2025 11:15  Patient On (Oxygen Delivery Method): room air      CONSTITUTIONAL: NAD, well-groomed  RESPIRATORY: Normal respiratory effort; mild crackles noted  CARDIOVASCULAR: Regular rate and rhythm, no LE edema  ABDOMEN: Nontender to palpation, normoactive bowel sounds    LABS:                        7.8    6.78  )-----------( 134      ( 01 Apr 2025 06:00 )             24.5     04-01    145  |  111[H]  |  59.1[H]  ----------------------------<  129[H]  3.5   |  20.0[L]  |  4.57[H]    Ca    7.5[L]      01 Apr 2025 06:00  Phos  3.9     04-01  Mg     2.3     04-01            Urinalysis Basic - ( 01 Apr 2025 06:00 )    Color: x / Appearance: x / SG: x / pH: x  Gluc: 129 mg/dL / Ketone: x  / Bili: x / Urobili: x   Blood: x / Protein: x / Nitrite: x   Leuk Esterase: x / RBC: x / WBC x   Sq Epi: x / Non Sq Epi: x / Bacteria: x        CAPILLARY BLOOD GLUCOSE          RADIOLOGY & ADDITIONAL TESTS:  Results Reviewed:   Imaging Personally Reviewed:  Electrocardiogram Personally Reviewed:

## 2025-04-01 NOTE — PROGRESS NOTE ADULT - ASSESSMENT
75 y/o M w/ a hx of HFrEF, Paroxysmal Afib on AC, CKD3b, HTN, HLD, BPH here for mgmt of Acute On Chronic HFrEF and Epistaxis    #Anemia blood loss   - due to epistaxis   - melena likely due to above   - GI consulted no plan for EGD   - monitor HB stable   - s/p 2u PRBC 3/26 overnight   - plan for additional 1/2 unit 4/1    #ANGEL on CKD3b -- worsening   - hold diuretics   - caution w/ nephrotoxic agents  - BMP in am     #Acute On Chronic HFrEF  - DC lasix   - resume torsemide if NA and cr improves   - cardiology input appreciated   - dc Meto, changed to Coreg BID    #Paroxysmal Atrial Fibrillation on AC  - Dc eliquis per cardio   - outpatient follow up for watchman   - cont amiodarone 100mg Q24  - plan for outpatient LAAO     #Epistaxis  - HOLD AC as above  - ENT consulted s/p rocket removal  - dc empiric Augmentin     #BPH  - flomax    #Skin rash fungal skin infection   - cont nystatin powder     Dispo: DC pending renal recovery and Hg stabilization   lives alone

## 2025-04-01 NOTE — CONSULT NOTE ADULT - CONSULT REQUESTED BY NAME
Dr. Wallace
ED
ED
Roxi Toledo
We are seeing this patient after being identified on the advanced illness list as a patient with a readmission to the hospital. CHF
El

## 2025-04-01 NOTE — CONSULT NOTE ADULT - CONSULT REASON
CHF
CKD
CKD, ANGEL
Epistaxis
Dark color stool, r/o GI bleeding
We are seeing this patient after being identified on the advanced illness list as a patient with a readmission to the hospital. CHF

## 2025-04-01 NOTE — CHART NOTE - NSCHARTNOTEFT_GEN_A_CORE
Notified by RN pt with 13 beats nonsustained Vtach with spontaneous conversion to afib HR 61.  Patient asymptomatic.  VSS T 98.9 F oral, /57, RR 18, O2 sat 96% on RA  BMP, Mg, Phos pending  RN instructed to continue to monitor pt and notify provider of any changes in pt status.

## 2025-04-01 NOTE — CONSULT NOTE ADULT - CONSULT REQUESTED DATE/TIME
01-Apr-2025
26-Mar-2025 15:43
26-Mar-2025 14:04
27-Mar-2025 08:30
27-Mar-2025 14:08
26-Mar-2025 14:52

## 2025-04-01 NOTE — PROVIDER CONTACT NOTE (OTHER) - DATE AND TIME:
27-Mar-2025 06:11
27-Mar-2025 13:23
01-Apr-2025 02:40
26-Mar-2025 17:58
27-Mar-2025 00:00
27-Mar-2025 09:00
31-Mar-2025 17:12
27-Mar-2025 03:02

## 2025-04-01 NOTE — CONSULT NOTE ADULT - SUBJECTIVE AND OBJECTIVE BOX
HPI:  75 y/o M w/ a hx of HFrEF, Paroxysmal Afib on AC, CKD3b, HTN, HLD, BPH presented with epistaxis and cough. Stated that he had the cough since yesterday, woke up in a coughing fit and nosebleed from left nare, came to ED, nosebleed controlled, however patient having audible rales w/ elevated BP. Saturating well on room air, non productive cough, denies HA, CP, NVD, Lethargy. (26 Mar 2025 11:59)    75 y/o M w/ a hx of HFrEF, Paroxysmal Afib on AC, CKD3b, HTN, HLD, BPH presented with epistaxis and cough and dyspnea Patient was admitted for acute on chronic HFrEF and epistaxis. Palliative was seeing this patient after identified on the advanced illness list (CHF) as a patient with a readmission to the hospital.    PERTINENT PMH REVIEWED: Yes     PAST MEDICAL & SURGICAL HISTORY:  Hypertension      CKD (chronic kidney disease)      Afib      HLD (hyperlipidemia)      Presence of stent of CABG      SOCIAL HISTORY:                      Substance history: Denied                    Admitted from:  home                     Alevism/spirituality: Muslim                    Cultural concerns:                      Surrogate/HCP/Guardian: Phone#:    Nii Dias [phone]     FAMILY HISTORY:  FHx: heart disease (Father)      Allergies    No Known Allergies    Intolerances      ADVANCE DIRECTIVES/TREATMENT PREFERENCES:  Full code, all aggressive measures desired     Baseline ADLs (prior to admission):  Independent    PPS:  50%  http://npcrc.org/files/news/palliative_performance_scale_[email or web address].pdf    Present Symptoms:     Dyspnea: No  Nausea/Vomiting: No  Anxiety:  No  Depression: No  Fatigue: No  Loss of appetite: No  Constipation: No BM documented for today    Pain: Yes No            Character-            Duration-            Effect-            Factors-            Frequency-            Location-            Severity-    Pain AD Score:  [email or web address] (press ctrl + left click to view)    Review of Systems: Reviewed  CONSTITUTIONAL: Denies fever  HEENT: Denies acute changes in vision and hearing  CARDIO: Denies CP  PULM: Denies SOB  ABD: Denies abd pain  : Denies dysuria  NEURO: Denies HA  EXTREMITIES: Denies calf pain    MEDICATIONS  (STANDING):  allopurinol 100 milliGRAM(s) Oral daily  aMIOdarone    Tablet 100 milliGRAM(s) Oral daily  atorvastatin 20 milliGRAM(s) Oral at bedtime  carvedilol 3.125 milliGRAM(s) Oral every 12 hours  cyanocobalamin 1000 MICROGram(s) Oral daily  epoetin esvin-epbx (RETACRIT) Injectable 35922 Unit(s) SubCutaneous every 7 days  ergocalciferol 61134 Unit(s) Oral every week  folic acid 1 milliGRAM(s) Oral daily  influenza  Vaccine (HIGH DOSE) 0.5 milliLiter(s) IntraMuscular once  nystatin Powder 1 Application(s) Topical two times a day  pantoprazole  Injectable 40 milliGRAM(s) IV Push every 12 hours  polyethylene glycol 3350 17 Gram(s) Oral daily  senna 2 Tablet(s) Oral at bedtime  sodium bicarbonate 650 milliGRAM(s) Oral two times a day  sodium chloride 0.65% Nasal 1 Spray(s) Both Nostrils every 6 hours  tamsulosin 0.4 milliGRAM(s) Oral at bedtime    MEDICATIONS  (PRN):  acetaminophen     Tablet .. 650 milliGRAM(s) Oral every 6 hours PRN Temp greater or equal to 38C (100.4F), Mild Pain (1 - 3)  aluminum hydroxide/magnesium hydroxide/simethicone Suspension 30 milliLiter(s) Oral every 4 hours PRN Dyspepsia  bisacodyl Suppository 10 milliGRAM(s) Rectal daily PRN Constipation  melatonin 3 milliGRAM(s) Oral at bedtime PRN Insomnia  ondansetron Injectable 4 milliGRAM(s) IV Push every 8 hours PRN Nausea and/or Vomiting      PHYSICAL EXAM:    Vital Signs Last 24 Hrs  T(C): 37.2 (01 Apr 2025 11:15), Max: 37.2 (31 Mar 2025 19:43)  T(F): 99 (01 Apr 2025 11:15), Max: 99 (31 Mar 2025 19:43)  HR: 60 (01 Apr 2025 11:15) (60 - 88)  BP: 126/57 (01 Apr 2025 11:15) (106/60 - 150/65)  BP(mean): --  RR: 18 (01 Apr 2025 11:15) (17 - 18)  SpO2: 98% (01 Apr 2025 11:15) (96% - 99%)    Parameters below as of 01 Apr 2025 11:15  Patient On (Oxygen Delivery Method): room air        General: alert  oriented x ____ lethargic agitated delirious                  cachexia  nonverbal  coma    HEENT: normal  dry mouth  ET tube/trach    Lungs: comfortable tachypnea/labored breathing  excessive secretions    CV: normal  tachycardia    GI: normal  distended  tender  no BS               PEG/NG/OG tube  constipation  last BM:     : normal  incontinent  oliguria/anuria  daley    MSK: normal  weakness  edema             ambulatory  bedbound/wheelchair bound    Neuro: no focal deficits cognitive impairment dysphagia dysarthria hemiplegia     Skin: normal  pressure ulcers- Stage_____  no rash    LABS:                        7.8    6.78  )-----------( 134      ( 01 Apr 2025 06:00 )             24.5     04-01    145  |  111[H]  |  59.1[H]  ----------------------------<  129[H]  3.5   |  20.0[L]  |  4.57[H]    Ca    7.5[L]      01 Apr 2025 06:00  Phos  3.9     04-01  Mg     2.3     04-01        Urinalysis Basic - ( 01 Apr 2025 06:00 )    Color: x / Appearance: x / SG: x / pH: x  Gluc: 129 mg/dL / Ketone: x  / Bili: x / Urobili: x   Blood: x / Protein: x / Nitrite: x   Leuk Esterase: x / RBC: x / WBC x   Sq Epi: x / Non Sq Epi: x / Bacteria: x      I&O's Summary    31 Mar 2025 07:01  -  01 Apr 2025 07:00  --------------------------------------------------------  IN: 720 mL / OUT: 1670 mL / NET: -950 mL        RADIOLOGY & ADDITIONAL STUDIES:       HPI:  75 y/o M w/ a hx of HFrEF, Paroxysmal Afib on AC, CKD3b, HTN, HLD, BPH presented with epistaxis and cough. Stated that he had the cough since yesterday, woke up in a coughing fit and nosebleed from left nare, came to ED, nosebleed controlled, however patient having audible rales w/ elevated BP. Saturating well on room air, non productive cough, denies HA, CP, NVD, Lethargy. (26 Mar 2025 11:59)    75 y/o M w/ a hx of HFrEF, Paroxysmal Afib on AC, CKD3b, HTN, HLD, BPH presented with epistaxis and cough and dyspnea Patient was admitted for acute on chronic HFrEF and epistaxis. Palliative was seeing this patient after identified on the advanced illness list (CHF) as a patient with a readmission to the hospital.    PERTINENT PMH REVIEWED: Yes     PAST MEDICAL & SURGICAL HISTORY:  Hypertension      CKD (chronic kidney disease)      Afib      HLD (hyperlipidemia)      Presence of stent of CABG      SOCIAL HISTORY:                      Substance history: Denied                    Admitted from:  home                     Tenriism/spirituality: Restorationist                    Cultural concerns:                      Surrogate/HCP/Guardian: Phone#:    Nii Dias [phone]     FAMILY HISTORY:  FHx: heart disease (Father)      Allergies    No Known Allergies    Intolerances      ADVANCE DIRECTIVES/TREATMENT PREFERENCES:  Full code, all aggressive measures desired     Baseline ADLs (prior to admission):  Independent    PPS:  50%  http://npcrc.org/files/news/palliative_performance_scale_[email or web address].pdf    Present Symptoms:     Dyspnea: No  Nausea/Vomiting: No  Anxiety:  No  Depression: No  Fatigue: No  Loss of appetite: No  Constipation: No BM documented for today    Pain: Yes No            Character-            Duration-            Effect-            Factors-            Frequency-            Location-            Severity-    Pain AD Score:  [email or web address] (press ctrl + left click to view)    Review of Systems: Reviewed  CONSTITUTIONAL: Denies fever  HEENT: Denies acute changes in vision and hearing  CARDIO: Denies CP  PULM: Denies SOB  ABD: Denies abd pain  : Denies dysuria  NEURO: Denies HA  EXTREMITIES: Denies calf pain    MEDICATIONS  (STANDING):  allopurinol 100 milliGRAM(s) Oral daily  aMIOdarone    Tablet 100 milliGRAM(s) Oral daily  atorvastatin 20 milliGRAM(s) Oral at bedtime  carvedilol 3.125 milliGRAM(s) Oral every 12 hours  cyanocobalamin 1000 MICROGram(s) Oral daily  epoetin esvin-epbx (RETACRIT) Injectable 29357 Unit(s) SubCutaneous every 7 days  ergocalciferol 55301 Unit(s) Oral every week  folic acid 1 milliGRAM(s) Oral daily  influenza  Vaccine (HIGH DOSE) 0.5 milliLiter(s) IntraMuscular once  nystatin Powder 1 Application(s) Topical two times a day  pantoprazole  Injectable 40 milliGRAM(s) IV Push every 12 hours  polyethylene glycol 3350 17 Gram(s) Oral daily  senna 2 Tablet(s) Oral at bedtime  sodium bicarbonate 650 milliGRAM(s) Oral two times a day  sodium chloride 0.65% Nasal 1 Spray(s) Both Nostrils every 6 hours  tamsulosin 0.4 milliGRAM(s) Oral at bedtime    MEDICATIONS  (PRN):  acetaminophen     Tablet .. 650 milliGRAM(s) Oral every 6 hours PRN Temp greater or equal to 38C (100.4F), Mild Pain (1 - 3)  aluminum hydroxide/magnesium hydroxide/simethicone Suspension 30 milliLiter(s) Oral every 4 hours PRN Dyspepsia  bisacodyl Suppository 10 milliGRAM(s) Rectal daily PRN Constipation  melatonin 3 milliGRAM(s) Oral at bedtime PRN Insomnia  ondansetron Injectable 4 milliGRAM(s) IV Push every 8 hours PRN Nausea and/or Vomiting      PHYSICAL EXAM:    Vital Signs Last 24 Hrs  T(C): 37.2 (01 Apr 2025 11:15), Max: 37.2 (31 Mar 2025 19:43)  T(F): 99 (01 Apr 2025 11:15), Max: 99 (31 Mar 2025 19:43)  HR: 60 (01 Apr 2025 11:15) (60 - 88)  BP: 126/57 (01 Apr 2025 11:15) (106/60 - 150/65)  BP(mean): --  RR: 18 (01 Apr 2025 11:15) (17 - 18)  SpO2: 98% (01 Apr 2025 11:15) (96% - 99%)    Parameters below as of 01 Apr 2025 11:15  Patient On (Oxygen Delivery Method): room air        General: alert  oriented x 3    HEENT: normal      Lungs: comfortable     CV: normal      GI: normal  nondistended  nontender      : normal      MSK: +weakness  +edema    Neuro: no focal deficits    Skin: discoloration of the lower extremities, no rash    LABS:                        7.8    6.78  )-----------( 134      ( 01 Apr 2025 06:00 )             24.5     04-01    145  |  111[H]  |  59.1[H]  ----------------------------<  129[H]  3.5   |  20.0[L]  |  4.57[H]    Ca    7.5[L]      01 Apr 2025 06:00  Phos  3.9     04-01  Mg     2.3     04-01        Urinalysis Basic - ( 01 Apr 2025 06:00 )    Color: x / Appearance: x / SG: x / pH: x  Gluc: 129 mg/dL / Ketone: x  / Bili: x / Urobili: x   Blood: x / Protein: x / Nitrite: x   Leuk Esterase: x / RBC: x / WBC x   Sq Epi: x / Non Sq Epi: x / Bacteria: x      I&O's Summary    31 Mar 2025 07:01  -  01 Apr 2025 07:00  --------------------------------------------------------  IN: 720 mL / OUT: 1670 mL / NET: -950 mL        RADIOLOGY & ADDITIONAL STUDIES:    US renal   FINDINGS:  Technically difficult study due to body habitus.  Right kidney: 8.8 cm. No renal mass, hydronephrosis or calculi. Cyst    Left kidney: 9.5 cm. No renal mass, hydronephrosis or calculi. Cyst    Urinary bladder: Within normal limits.    IMPRESSION:  No hydronephrosis bilaterally.    Echo  CONCLUSIONS:      1. Left ventricular cavity is normal in size. Left ventricular wall thickness is normal. Left ventricular systolic function is severely decreased with an ejection fraction visually estimated at 25 to 30 %.   2. The left ventricular diastolic function is indeterminate.   3. Moderately enlarged right ventricular cavity size and moderately to severely reduced right ventricular systolic function.   4. Left atrium is moderately dilated.   5. The right atrium is moderately dilated.   6. 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 No intravalvular or paravalvular regurgitation.   7. The peak transaortic velocity is 1.92 m/s, peak transaortic gradient is 14.7 mmHg and mean transaortic gradient is 7.0 mmHg with an LVOT/aortic valve VTI ratio of 0.40. The aortic valve acceleration time is 74 msec.   8. Mitral valve leaflets are diffusely calcified.   9. There is mild posterior calcification of the mitral valve annulus.  10. Moderate mitral regurgitation.  11. Moderate to severe tricuspid regurgitation.  12. Estimated pulmonary artery systolic pressure is 46 mmHg, mild pulmonary hypertension.  13. No pericardial effusion seen.     HPI:  75 y/o M w/ a hx of HFrEF, Paroxysmal Afib on AC, CKD3b, HTN, HLD, BPH presented with epistaxis and cough. Stated that he had the cough since yesterday, woke up in a coughing fit and nosebleed from left nare, came to ED, nosebleed controlled, however patient having audible rales w/ elevated BP. Saturating well on room air, non productive cough, denies HA, CP, NVD, Lethargy. (26 Mar 2025 11:59)    75 y/o M w/ a hx of HFrEF, Paroxysmal Afib on AC, CKD3b, HTN, HLD, BPH presented with epistaxis and cough and dyspnea Patient was admitted for acute on chronic HFrEF and epistaxis. s/p 2U of pRBC transfused. Palliative was seeing this patient after identified on the advanced illness list (CHF) as a patient with a readmission to the hospital.    PERTINENT PMH REVIEWED: Yes     PAST MEDICAL & SURGICAL HISTORY:  Hypertension      CKD (chronic kidney disease)      Afib      HLD (hyperlipidemia)      Presence of stent of CABG      SOCIAL HISTORY:                      Substance history: Denied                    Admitted from:  home                     Rastafarian/spirituality: Shinto                    Cultural concerns:                      Surrogate/HCP/Guardian: Phone#:    Nii Dias [phone]    FAMILY HISTORY:  FHx: heart disease (Father)      Allergies    No Known Allergies    Intolerances      ADVANCE DIRECTIVES/TREATMENT PREFERENCES:  Full code, all aggressive measures desired     Baseline ADLs (prior to admission):  Independent    PPS:  50%  http://npcrc.org/files/news/palliative_performance_scale_[email or web address].pdf    Present Symptoms:     Dyspnea: No  Nausea/Vomiting: No  Anxiety:  No  Depression: No  Fatigue: No  Loss of appetite: No  Constipation: No BM documented for today    Pain: Yes No            Character-            Duration-            Effect-            Factors-            Frequency-            Location-            Severity-    Pain AD Score:  [email or web address] (press ctrl + left click to view)    Review of Systems: Reviewed  CONSTITUTIONAL: Denies fever  HEENT: Denies acute changes in vision and hearing  CARDIO: Denies CP  PULM: Denies SOB  ABD: Denies abd pain  : Denies dysuria  NEURO: Denies HA  EXTREMITIES: Denies calf pain    MEDICATIONS  (STANDING):  allopurinol 100 milliGRAM(s) Oral daily  aMIOdarone    Tablet 100 milliGRAM(s) Oral daily  atorvastatin 20 milliGRAM(s) Oral at bedtime  carvedilol 3.125 milliGRAM(s) Oral every 12 hours  cyanocobalamin 1000 MICROGram(s) Oral daily  epoetin esvin-epbx (RETACRIT) Injectable 96497 Unit(s) SubCutaneous every 7 days  ergocalciferol 20006 Unit(s) Oral every week  folic acid 1 milliGRAM(s) Oral daily  influenza  Vaccine (HIGH DOSE) 0.5 milliLiter(s) IntraMuscular once  nystatin Powder 1 Application(s) Topical two times a day  pantoprazole  Injectable 40 milliGRAM(s) IV Push every 12 hours  polyethylene glycol 3350 17 Gram(s) Oral daily  senna 2 Tablet(s) Oral at bedtime  sodium bicarbonate 650 milliGRAM(s) Oral two times a day  sodium chloride 0.65% Nasal 1 Spray(s) Both Nostrils every 6 hours  tamsulosin 0.4 milliGRAM(s) Oral at bedtime    MEDICATIONS  (PRN):  acetaminophen     Tablet .. 650 milliGRAM(s) Oral every 6 hours PRN Temp greater or equal to 38C (100.4F), Mild Pain (1 - 3)  aluminum hydroxide/magnesium hydroxide/simethicone Suspension 30 milliLiter(s) Oral every 4 hours PRN Dyspepsia  bisacodyl Suppository 10 milliGRAM(s) Rectal daily PRN Constipation  melatonin 3 milliGRAM(s) Oral at bedtime PRN Insomnia  ondansetron Injectable 4 milliGRAM(s) IV Push every 8 hours PRN Nausea and/or Vomiting      PHYSICAL EXAM:    Vital Signs Last 24 Hrs  T(C): 37.2 (01 Apr 2025 11:15), Max: 37.2 (31 Mar 2025 19:43)  T(F): 99 (01 Apr 2025 11:15), Max: 99 (31 Mar 2025 19:43)  HR: 60 (01 Apr 2025 11:15) (60 - 88)  BP: 126/57 (01 Apr 2025 11:15) (106/60 - 150/65)  BP(mean): --  RR: 18 (01 Apr 2025 11:15) (17 - 18)  SpO2: 98% (01 Apr 2025 11:15) (96% - 99%)    Parameters below as of 01 Apr 2025 11:15  Patient On (Oxygen Delivery Method): room air        General: alert  oriented x 3    HEENT: normal      Lungs: comfortable     CV: normal      GI: normal  nondistended  nontender      : normal      MSK: +weakness  +edema    Neuro: no focal deficits    Skin: discoloration of the lower extremities, no rash    LABS:                        7.8    6.78  )-----------( 134      ( 01 Apr 2025 06:00 )             24.5     04-01    145  |  111[H]  |  59.1[H]  ----------------------------<  129[H]  3.5   |  20.0[L]  |  4.57[H]    Ca    7.5[L]      01 Apr 2025 06:00  Phos  3.9     04-01  Mg     2.3     04-01        Urinalysis Basic - ( 01 Apr 2025 06:00 )    Color: x / Appearance: x / SG: x / pH: x  Gluc: 129 mg/dL / Ketone: x  / Bili: x / Urobili: x   Blood: x / Protein: x / Nitrite: x   Leuk Esterase: x / RBC: x / WBC x   Sq Epi: x / Non Sq Epi: x / Bacteria: x      I&O's Summary    31 Mar 2025 07:01  -  01 Apr 2025 07:00  --------------------------------------------------------  IN: 720 mL / OUT: 1670 mL / NET: -950 mL        RADIOLOGY & ADDITIONAL STUDIES:    US renal   FINDINGS:  Technically difficult study due to body habitus.  Right kidney: 8.8 cm. No renal mass, hydronephrosis or calculi. Cyst    Left kidney: 9.5 cm. No renal mass, hydronephrosis or calculi. Cyst    Urinary bladder: Within normal limits.    IMPRESSION:  No hydronephrosis bilaterally.    Echo  CONCLUSIONS:      1. Left ventricular cavity is normal in size. Left ventricular wall thickness is normal. Left ventricular systolic function is severely decreased with an ejection fraction visually estimated at 25 to 30 %.   2. The left ventricular diastolic function is indeterminate.   3. Moderately enlarged right ventricular cavity size and moderately to severely reduced right ventricular systolic function.   4. Left atrium is moderately dilated.   5. The right atrium is moderately dilated.   6. 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 No intravalvular or paravalvular regurgitation.   7. The peak transaortic velocity is 1.92 m/s, peak transaortic gradient is 14.7 mmHg and mean transaortic gradient is 7.0 mmHg with an LVOT/aortic valve VTI ratio of 0.40. The aortic valve acceleration time is 74 msec.   8. Mitral valve leaflets are diffusely calcified.   9. There is mild posterior calcification of the mitral valve annulus.  10. Moderate mitral regurgitation.  11. Moderate to severe tricuspid regurgitation.  12. Estimated pulmonary artery systolic pressure is 46 mmHg, mild pulmonary hypertension.  13. No pericardial effusion seen.

## 2025-04-01 NOTE — CONSULT NOTE ADULT - CONVERSATION DETAILS
Discussed diagnosis, current medical management, prognosis, and treatment options with patient. Patient lived alone. Girlfriend passed away. Had no children. Stated brother Nii was the HCP and Nii had the HCP. Discussed CPR, intubation, and bipap. Patient wanted to be kept full code. Had AICD implanted and wanted the defibrillator to shock him if he needed it. Discussed patient's renal function was declining. Discussed quality of life for dialysis patients, risks and benefits of dialysis. Patient wanted a trial of dialysis if he needed it. Patient's goal was clear - wanted to continue medical intervention. Answered his questions and he verbalized understanding. Discussed diagnosis, current medical management, prognosis, and treatment options with patient. Patient lived alone. Ambulated with a cane and drove by himself. Denied dsypnea at rest. Girlfriend passed away. Had no children. Stated brother Nii was the HCP and Nii had the HCP. Discussed CPR, intubation, and bipap. Patient wanted to be kept full code. Had AICD implanted and wanted the defibrillator to shock him if he needed it. Discussed patient's renal function was declining. Discussed quality of life for dialysis patients, risks and benefits of dialysis. Patient wanted a trial of dialysis if he needed it. Patient's goal was clear - wanted to continue medical intervention. Patient gave permission to discuss with HCP Nii. Answered his questions and he verbalized understanding.    Discussed diagnosis, current medical management, prognosis, and treatment options with Nii.  Discussed that patient wanted defibrillator to shock him if needed it. However, encouraged Nii to continue discuss GOC with patient and know his goals and wishes. When patient declines, Nii is the HCP and would make the medical decision for patient. Discussed the quality of life for dialysis patients. Discussed risks and benefits of CPR and intubation. Nii agreed to continue having GOC with patient and will provide a copy of HCP. Provided palliative email. Answered his questions and he verbalized understanding.

## 2025-04-01 NOTE — CONSULT NOTE ADULT - REASON FOR ADMISSION
Acute On Chronic HFrEF

## 2025-04-02 LAB
ANION GAP SERPL CALC-SCNC: 15 MMOL/L — SIGNIFICANT CHANGE UP (ref 5–17)
BUN SERPL-MCNC: 53.7 MG/DL — HIGH (ref 8–20)
CALCIUM SERPL-MCNC: 7.7 MG/DL — LOW (ref 8.4–10.5)
CHLORIDE SERPL-SCNC: 110 MMOL/L — HIGH (ref 96–108)
CO2 SERPL-SCNC: 19 MMOL/L — LOW (ref 22–29)
CREAT SERPL-MCNC: 4 MG/DL — HIGH (ref 0.5–1.3)
EGFR: 15 ML/MIN/1.73M2 — LOW
EGFR: 15 ML/MIN/1.73M2 — LOW
GLUCOSE SERPL-MCNC: 102 MG/DL — HIGH (ref 70–99)
HCT VFR BLD CALC: 25.3 % — LOW (ref 39–50)
HGB BLD-MCNC: 8.2 G/DL — LOW (ref 13–17)
MAGNESIUM SERPL-MCNC: 2.3 MG/DL — SIGNIFICANT CHANGE UP (ref 1.6–2.6)
MCHC RBC-ENTMCNC: 32.4 G/DL — SIGNIFICANT CHANGE UP (ref 32–36)
MCHC RBC-ENTMCNC: 32.7 PG — SIGNIFICANT CHANGE UP (ref 27–34)
MCV RBC AUTO: 100.8 FL — HIGH (ref 80–100)
NRBC # BLD AUTO: 0 K/UL — SIGNIFICANT CHANGE UP (ref 0–0)
NRBC # FLD: 0 K/UL — SIGNIFICANT CHANGE UP (ref 0–0)
NRBC BLD AUTO-RTO: 0 /100 WBCS — SIGNIFICANT CHANGE UP (ref 0–0)
PLATELET # BLD AUTO: 157 K/UL — SIGNIFICANT CHANGE UP (ref 150–400)
PMV BLD: 10.9 FL — SIGNIFICANT CHANGE UP (ref 7–13)
POTASSIUM SERPL-MCNC: 4 MMOL/L — SIGNIFICANT CHANGE UP (ref 3.5–5.3)
POTASSIUM SERPL-SCNC: 4 MMOL/L — SIGNIFICANT CHANGE UP (ref 3.5–5.3)
RBC # BLD: 2.51 M/UL — LOW (ref 4.2–5.8)
RBC # FLD: 16.5 % — HIGH (ref 10.3–14.5)
SODIUM SERPL-SCNC: 144 MMOL/L — SIGNIFICANT CHANGE UP (ref 135–145)
WBC # BLD: 7.13 K/UL — SIGNIFICANT CHANGE UP (ref 3.8–10.5)
WBC # FLD AUTO: 7.13 K/UL — SIGNIFICANT CHANGE UP (ref 3.8–10.5)

## 2025-04-02 PROCEDURE — 99232 SBSQ HOSP IP/OBS MODERATE 35: CPT

## 2025-04-02 RX ORDER — IRON SUCROSE 20 MG/ML
200 INJECTION, SOLUTION INTRAVENOUS EVERY 24 HOURS
Refills: 0 | Status: COMPLETED | OUTPATIENT
Start: 2025-04-02 | End: 2025-04-03

## 2025-04-02 RX ORDER — MAGNESIUM CITRATE
296 SOLUTION, ORAL ORAL ONCE
Refills: 0 | Status: COMPLETED | OUTPATIENT
Start: 2025-04-02 | End: 2025-04-02

## 2025-04-02 RX ORDER — SODIUM BICARBONATE 1 MEQ/ML
1300 SYRINGE (ML) INTRAVENOUS
Refills: 0 | Status: DISCONTINUED | OUTPATIENT
Start: 2025-04-02 | End: 2025-04-03

## 2025-04-02 RX ADMIN — Medication 650 MILLIGRAM(S): at 05:50

## 2025-04-02 RX ADMIN — Medication 100 MILLIGRAM(S): at 07:57

## 2025-04-02 RX ADMIN — Medication 296 MILLILITER(S): at 18:53

## 2025-04-02 RX ADMIN — Medication 40 MILLIGRAM(S): at 17:17

## 2025-04-02 RX ADMIN — FOLIC ACID 1 MILLIGRAM(S): 1 TABLET ORAL at 07:58

## 2025-04-02 RX ADMIN — IRON SUCROSE 220 MILLIGRAM(S): 20 INJECTION, SOLUTION INTRAVENOUS at 17:17

## 2025-04-02 RX ADMIN — CARVEDILOL 3.12 MILLIGRAM(S): 3.12 TABLET, FILM COATED ORAL at 05:50

## 2025-04-02 RX ADMIN — NYSTATIN 1 APPLICATION(S): 100000 CREAM TOPICAL at 05:51

## 2025-04-02 RX ADMIN — Medication 1 SPRAY(S): at 00:32

## 2025-04-02 RX ADMIN — AMIODARONE HYDROCHLORIDE 100 MILLIGRAM(S): 50 INJECTION, SOLUTION INTRAVENOUS at 05:51

## 2025-04-02 RX ADMIN — Medication 1 SPRAY(S): at 17:04

## 2025-04-02 RX ADMIN — Medication 40 MILLIGRAM(S): at 05:49

## 2025-04-02 RX ADMIN — Medication 1300 MILLIGRAM(S): at 17:15

## 2025-04-02 RX ADMIN — NYSTATIN 1 APPLICATION(S): 100000 CREAM TOPICAL at 17:04

## 2025-04-02 RX ADMIN — POLYETHYLENE GLYCOL 3350 17 GRAM(S): 17 POWDER, FOR SOLUTION ORAL at 07:58

## 2025-04-02 RX ADMIN — Medication 1 SPRAY(S): at 10:09

## 2025-04-02 RX ADMIN — TAMSULOSIN HYDROCHLORIDE 0.4 MILLIGRAM(S): 0.4 CAPSULE ORAL at 22:16

## 2025-04-02 RX ADMIN — CYANOCOBALAMIN 1000 MICROGRAM(S): 1000 INJECTION INTRAMUSCULAR; SUBCUTANEOUS at 07:57

## 2025-04-02 RX ADMIN — Medication 1 SPRAY(S): at 05:50

## 2025-04-02 RX ADMIN — ATORVASTATIN CALCIUM 20 MILLIGRAM(S): 80 TABLET, FILM COATED ORAL at 22:16

## 2025-04-02 RX ADMIN — Medication 2 TABLET(S): at 22:16

## 2025-04-02 NOTE — PROGRESS NOTE ADULT - SUBJECTIVE AND OBJECTIVE BOX
Formerly [facility], [facility]                              [address] [place] [address]                                                 PHONE: [phone]                                                 FAX: [phone]  -----------------------------------------------------------------------------------------------  Pt seen and examined. FU for  shortness of breath     Overnight events/Complaints: Pt reports breathing better.     RELEVANT PHYSICAL EXAM:    Alert NAD  Neck: No obvious JVD  Cardiovascular: regular S1, S2  Respiratory: Lungs clear to auscultation; no crepitations, no wheeze  Musculoskeletal: No edema    LABS:                        7.8    6.78  )-----------( 134      ( 01 Apr 2025 06:00 )             24.5     04-01    145  |  111[H]  |  59.1[H]  ----------------------------<  129[H]  3.5   |  20.0[L]  |  4.57[H]    Ca    7.5[L]      01 Apr 2025 06:00  Phos  3.9     04-01  Mg     2.3     04-01    Vital Signs Last 24 Hrs  T(C): 37.1 (01 Apr 2025 08:02), Max: 37.2 (31 Mar 2025 11:25)  T(F): 98.8 (01 Apr 2025 08:02), Max: 99 (31 Mar 2025 11:25)  HR: 61 (01 Apr 2025 08:02) (61 - 88)  BP: 106/60 (01 Apr 2025 08:02) (106/60 - 150/65)  BP(mean): --  RR: 18 (01 Apr 2025 08:02) (17 - 18)  SpO2: 98% (01 Apr 2025 08:02) (96% - 99%)    Parameters below as of 01 Apr 2025 08:02  Patient On (Oxygen Delivery Method): room air      I&O's Summary    31 Mar 2025 07:01  -  01 Apr 2025 07:00  --------------------------------------------------------  IN: 720 mL / OUT: 1670 mL / NET: -950 mL    RADIOLOGY & ADDITIONAL STUDIES: (reviewed)  CXR was independently visualized/reviewed  and demonstrated: No evidence of CHF.    CARDIOLOGY TESTING:(reviewed)     TELEMETRY independently visualized/reviewed and demonstrated :     ECHO: 8/2024: LVEF 50-55%, mild MR, normal TAVR gradients    Echo:   1. Left ventricular cavity is normal in size. Left ventricular wall thickness is normal. Left ventricular systolic function is severely decreased with an ejection fraction visually estimated at 25 to 30 %.   2. The left ventricular diastolic function is indeterminate.   3. Moderately enlarged right ventricular cavity size and moderately to severely reduced right ventricular systolic function.   4. Left atrium is moderately dilated.   5. The right atrium is moderately dilated.   6. 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 No intravalvular or paravalvular regurgitation.   7. The peak transaortic velocity is 1.92 m/s, peak transaortic gradient is 14.7 mmHg and mean transaortic gradient is 7.0 mmHg with an LVOT/aortic valve VTI ratio of 0.40. The aortic valve acceleration time is 74 msec.   8. Mitral valve leaflets are diffusely calcified.   9. There is mild posterior calcification of the mitral valve annulus.  10. Moderate mitral regurgitation.  11. Moderate to severe tricuspid regurgitation.  12. Estimated pulmonary artery systolic pressure is 46 mmHg, mild pulmonary hypertension.  13. No pericardial effusion seen.    STRESS TEST: office 7/2024 PET: moderate anteroapical ischemia EF 45%      MEDICATIONS:(reviewed)    MEDICATIONS  (PRN):  acetaminophen     Tablet .. 650 milliGRAM(s) Oral every 6 hours PRN Temp greater or equal to 38C (100.4F), Mild Pain (1 - 3)  aluminum hydroxide/magnesium hydroxide/simethicone Suspension 30 milliLiter(s) Oral every 4 hours PRN Dyspepsia  bisacodyl Suppository 10 milliGRAM(s) Rectal daily PRN Constipation  melatonin 3 milliGRAM(s) Oral at bedtime PRN Insomnia  ondansetron Injectable 4 milliGRAM(s) IV Push every 8 hours PRN Nausea and/or Vomiting      MEDICATIONS  (STANDING):  allopurinol 100 milliGRAM(s) Oral daily  aMIOdarone    Tablet 100 milliGRAM(s) Oral daily  atorvastatin 20 milliGRAM(s) Oral at bedtime  carvedilol 3.125 milliGRAM(s) Oral every 12 hours  cyanocobalamin 1000 MICROGram(s) Oral daily  epoetin esvin-epbx (RETACRIT) Injectable 43636 Unit(s) SubCutaneous every 7 days  ergocalciferol 48877 Unit(s) Oral every week  folic acid 1 milliGRAM(s) Oral daily  influenza  Vaccine (HIGH DOSE) 0.5 milliLiter(s) IntraMuscular once  nystatin Powder 1 Application(s) Topical two times a day  pantoprazole  Injectable 40 milliGRAM(s) IV Push every 12 hours  polyethylene glycol 3350 17 Gram(s) Oral daily  senna 2 Tablet(s) Oral at bedtime  sodium bicarbonate 650 milliGRAM(s) Oral two times a day  sodium chloride 0.65% Nasal 1 Spray(s) Both Nostrils every 6 hours  tamsulosin 0.4 milliGRAM(s) Oral at bedtime    ASSESSMENT AND PLAN:    74y Male with  HTN HLD CKD baseline creat 3.6 CAD s/p CABG X 4 2008, initial LV dysfunction s/p ICD , post CABG PCI (RCA 2020, M 2021) s/p TAVR 2021 PAF s/p DCCV 2022, BIV ICD upgrade 2023 augmented EF 55% by echo 8/2024, last seen in ofice 9/2024 clinically well compensated on GDMT.   Came to Er c/o cough/epistaxis and dyspnea. Pt reports several days of increasing SANDERS orthopnea and awakened by nonproductive cough.  In addition to epistaxis pt reports bloody bowel movement earlier today, chronic gait instability.  No evidence of chest pain      Dc eliquis - pt not candidate - will consider outpt eval for LAAO  cont amio and low dose asa  continue Coreg 3.125 BID  Hold diuretics for now  Pt not candidate for repeat cath at this point given worsening renal function - cont GDMT  Being transfused                                               [facility]                                      [facility], [facility]                              [address] [place] [address]                                                 PHONE: [phone]                                                 FAX: [phone]  -----------------------------------------------------------------------------------------------  Pt seen and examined. FU for  shortness of breath     Overnight events/Complaints: Pt reports breathing better. Back to baseline    RELEVANT PHYSICAL EXAM:    Alert NAD  Neck: No obvious JVD  Cardiovascular: regular S1, S2  Respiratory: Lungs clear to auscultation; no crepitations, no wheeze  Musculoskeletal: No edema    LABS:                        7.8    6.78  )-----------( 134      ( 01 Apr 2025 06:00 )             24.5     04-01    145  |  111[H]  |  59.1[H]  ----------------------------<  129[H]  3.5   |  20.0[L]  |  4.57[H]    Ca    7.5[L]      01 Apr 2025 06:00  Phos  3.9     04-01  Mg     2.3     04-01    Vital Signs Last 24 Hrs  T(C): 37.1 (01 Apr 2025 08:02), Max: 37.2 (31 Mar 2025 11:25)  T(F): 98.8 (01 Apr 2025 08:02), Max: 99 (31 Mar 2025 11:25)  HR: 61 (01 Apr 2025 08:02) (61 - 88)  BP: 106/60 (01 Apr 2025 08:02) (106/60 - 150/65)  BP(mean): --  RR: 18 (01 Apr 2025 08:02) (17 - 18)  SpO2: 98% (01 Apr 2025 08:02) (96% - 99%)    Parameters below as of 01 Apr 2025 08:02  Patient On (Oxygen Delivery Method): room air      I&O's Summary    31 Mar 2025 07:01  -  01 Apr 2025 07:00  --------------------------------------------------------  IN: 720 mL / OUT: 1670 mL / NET: -950 mL    RADIOLOGY & ADDITIONAL STUDIES: (reviewed)  CXR was independently visualized/reviewed  and demonstrated: No evidence of CHF.    CARDIOLOGY TESTING:(reviewed)     TELEMETRY independently visualized/reviewed and demonstrated :     ECHO: 8/2024: LVEF 50-55%, mild MR, normal TAVR gradients    Echo:   1. Left ventricular cavity is normal in size. Left ventricular wall thickness is normal. Left ventricular systolic function is severely decreased with an ejection fraction visually estimated at 25 to 30 %.   2. The left ventricular diastolic function is indeterminate.   3. Moderately enlarged right ventricular cavity size and moderately to severely reduced right ventricular systolic function.   4. Left atrium is moderately dilated.   5. The right atrium is moderately dilated.   6. 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 No intravalvular or paravalvular regurgitation.   7. The peak transaortic velocity is 1.92 m/s, peak transaortic gradient is 14.7 mmHg and mean transaortic gradient is 7.0 mmHg with an LVOT/aortic valve VTI ratio of 0.40. The aortic valve acceleration time is 74 msec.   8. Mitral valve leaflets are diffusely calcified.   9. There is mild posterior calcification of the mitral valve annulus.  10. Moderate mitral regurgitation.  11. Moderate to severe tricuspid regurgitation.  12. Estimated pulmonary artery systolic pressure is 46 mmHg, mild pulmonary hypertension.  13. No pericardial effusion seen.    STRESS TEST: office 7/2024 PET: moderate anteroapical ischemia EF 45%      MEDICATIONS:(reviewed)    MEDICATIONS  (PRN):  acetaminophen     Tablet .. 650 milliGRAM(s) Oral every 6 hours PRN Temp greater or equal to 38C (100.4F), Mild Pain (1 - 3)  aluminum hydroxide/magnesium hydroxide/simethicone Suspension 30 milliLiter(s) Oral every 4 hours PRN Dyspepsia  bisacodyl Suppository 10 milliGRAM(s) Rectal daily PRN Constipation  melatonin 3 milliGRAM(s) Oral at bedtime PRN Insomnia  ondansetron Injectable 4 milliGRAM(s) IV Push every 8 hours PRN Nausea and/or Vomiting      MEDICATIONS  (STANDING):  allopurinol 100 milliGRAM(s) Oral daily  aMIOdarone    Tablet 100 milliGRAM(s) Oral daily  atorvastatin 20 milliGRAM(s) Oral at bedtime  carvedilol 3.125 milliGRAM(s) Oral every 12 hours  cyanocobalamin 1000 MICROGram(s) Oral daily  epoetin esvin-epbx (RETACRIT) Injectable 28466 Unit(s) SubCutaneous every 7 days  ergocalciferol 43221 Unit(s) Oral every week  folic acid 1 milliGRAM(s) Oral daily  influenza  Vaccine (HIGH DOSE) 0.5 milliLiter(s) IntraMuscular once  nystatin Powder 1 Application(s) Topical two times a day  pantoprazole  Injectable 40 milliGRAM(s) IV Push every 12 hours  polyethylene glycol 3350 17 Gram(s) Oral daily  senna 2 Tablet(s) Oral at bedtime  sodium bicarbonate 650 milliGRAM(s) Oral two times a day  sodium chloride 0.65% Nasal 1 Spray(s) Both Nostrils every 6 hours  tamsulosin 0.4 milliGRAM(s) Oral at bedtime    ASSESSMENT AND PLAN:    74y Male with  HTN HLD CKD baseline creat 3.6 CAD s/p CABG X 4 2008, initial LV dysfunction s/p ICD , post CABG PCI (RCA 2020, M 2021) s/p TAVR 2021 PAF s/p DCCV 2022, BIV ICD upgrade 2023 augmented EF 55% by echo 8/2024, last seen in ofice 9/2024 clinically well compensated on GDMT.   Came to Er c/o cough/epistaxis and dyspnea. Pt reports several days of increasing SANDERS orthopnea and awakened by nonproductive cough.  In addition to epistaxis pt reports bloody bowel movement earlier today, chronic gait instability.  No evidence of chest pain      No further cardiac intervention at present Dc eliquis - pt not candidate - will consider outpt eval for LAAO  cont amio and low dose asa  continue Coreg 3.125 BID  Pt not candidate for repeat cath at this point given worsening renal function - cont GDMT

## 2025-04-02 NOTE — PROGRESS NOTE ADULT - SUBJECTIVE AND OBJECTIVE BOX
[facility] Division of Hospital Medicine    SUBJECTIVE / OVERNIGHT EVENTS:    Patient denies chest pain, SOB, abd pain, N/V, fever, chills, dysuria or any other complaints. All remainder ROS negative.     MEDICATIONS  (STANDING):  allopurinol 100 milliGRAM(s) Oral daily  aMIOdarone    Tablet 100 milliGRAM(s) Oral daily  atorvastatin 20 milliGRAM(s) Oral at bedtime  carvedilol 3.125 milliGRAM(s) Oral every 12 hours  cyanocobalamin 1000 MICROGram(s) Oral daily  epoetin esvin-epbx (RETACRIT) Injectable 66353 Unit(s) SubCutaneous every 7 days  ergocalciferol 90289 Unit(s) Oral every week  folic acid 1 milliGRAM(s) Oral daily  influenza  Vaccine (HIGH DOSE) 0.5 milliLiter(s) IntraMuscular once  iron sucrose IVPB 200 milliGRAM(s) IV Intermittent every 24 hours  magnesium citrate Oral Solution 296 milliLiter(s) Oral once  nystatin Powder 1 Application(s) Topical two times a day  pantoprazole  Injectable 40 milliGRAM(s) IV Push every 12 hours  polyethylene glycol 3350 17 Gram(s) Oral daily  senna 2 Tablet(s) Oral at bedtime  sodium bicarbonate 1300 milliGRAM(s) Oral two times a day  sodium chloride 0.65% Nasal 1 Spray(s) Both Nostrils every 6 hours  tamsulosin 0.4 milliGRAM(s) Oral at bedtime    MEDICATIONS  (PRN):  acetaminophen     Tablet .. 650 milliGRAM(s) Oral every 6 hours PRN Temp greater or equal to 38C (100.4F), Mild Pain (1 - 3)  aluminum hydroxide/magnesium hydroxide/simethicone Suspension 30 milliLiter(s) Oral every 4 hours PRN Dyspepsia  bisacodyl Suppository 10 milliGRAM(s) Rectal daily PRN Constipation  melatonin 3 milliGRAM(s) Oral at bedtime PRN Insomnia  ondansetron Injectable 4 milliGRAM(s) IV Push every 8 hours PRN Nausea and/or Vomiting        I&O's Summary    01 Apr 2025 07:01  -  02 Apr 2025 07:00  --------------------------------------------------------  IN: 440 mL / OUT: 800 mL / NET: -360 mL    02 Apr 2025 07:01  -  02 Apr 2025 14:46  --------------------------------------------------------  IN: 450 mL / OUT: 700 mL / NET: -250 mL        PHYSICAL EXAM:  Vital Signs Last 24 Hrs  T(C): 36.7 (02 Apr 2025 11:26), Max: 36.9 (01 Apr 2025 20:00)  T(F): 98 (02 Apr 2025 11:26), Max: 98.5 (01 Apr 2025 20:00)  HR: 58 (02 Apr 2025 11:26) (58 - 71)  BP: 116/78 (02 Apr 2025 11:26) (112/66 - 147/73)  BP(mean): --  RR: 19 (02 Apr 2025 11:26) (18 - 19)  SpO2: 98% (02 Apr 2025 11:26) (94% - 98%)    Parameters below as of 02 Apr 2025 11:26  Patient On (Oxygen Delivery Method): room air          General: Age-appearing, in no acute distress  Head: Normocephalic, atraumatic  ENMT: EOMI, no scleral icterus, neck supple, no JVD  Cardiovascular: +S1, S2; Regular rate and rhythm, no murmurs.  Respiratory: CTAB, no wheezing, no rales, no rhonchi  Gastrointestinal: soft, ND, NT, (+) BS, (-) rebound  Neuro: AAOx3, CN2-12 intact, no FND  Musculoskeletal: Normal tone, no deformities  Skin: chronic LE venous stasis  Psych: Calm, cooperative     LABS:                        8.2    7.13  )-----------( 157      ( 02 Apr 2025 05:17 )             25.3     04-02    144  |  110[H]  |  53.7[H]  ----------------------------<  102[H]  4.0   |  19.0[L]  |  4.00[H]    Ca    7.7[L]      02 Apr 2025 05:17  Phos  3.9     04-01  Mg     2.3     04-02            Urinalysis Basic - ( 02 Apr 2025 05:17 )    Color: x / Appearance: x / SG: x / pH: x  Gluc: 102 mg/dL / Ketone: x  / Bili: x / Urobili: x   Blood: x / Protein: x / Nitrite: x   Leuk Esterase: x / RBC: x / WBC x   Sq Epi: x / Non Sq Epi: x / Bacteria: x        CAPILLARY BLOOD GLUCOSE            RADIOLOGY & ADDITIONAL TESTS:  Results Reviewed:   Imaging Personally Reviewed:  Electrocardiogram Personally Reviewed:

## 2025-04-02 NOTE — PROGRESS NOTE ADULT - ASSESSMENT
A) CRF plus  ARF; TAVR; Poor  EF  with pulmonary hypertension ; Anemia  with low  iron.    P) IV  iron, PO  Bicarb., magnesium .

## 2025-04-02 NOTE — PROGRESS NOTE ADULT - SUBJECTIVE AND OBJECTIVE BOX
NEPHROLOGY INTERVAL HPI/OVERNIGHT EVENTS:    No new events.    MEDICATIONS  (STANDING):  allopurinol 100 milliGRAM(s) Oral daily  aMIOdarone    Tablet 100 milliGRAM(s) Oral daily  atorvastatin 20 milliGRAM(s) Oral at bedtime  carvedilol 3.125 milliGRAM(s) Oral every 12 hours  cyanocobalamin 1000 MICROGram(s) Oral daily  epoetin esvin-epbx (RETACRIT) Injectable 91718 Unit(s) SubCutaneous every 7 days  ergocalciferol 60271 Unit(s) Oral every week  folic acid 1 milliGRAM(s) Oral daily  influenza  Vaccine (HIGH DOSE) 0.5 milliLiter(s) IntraMuscular once  nystatin Powder 1 Application(s) Topical two times a day  pantoprazole  Injectable 40 milliGRAM(s) IV Push every 12 hours  polyethylene glycol 3350 17 Gram(s) Oral daily  senna 2 Tablet(s) Oral at bedtime  sodium bicarbonate 650 milliGRAM(s) Oral two times a day  sodium chloride 0.65% Nasal 1 Spray(s) Both Nostrils every 6 hours  tamsulosin 0.4 milliGRAM(s) Oral at bedtime    MEDICATIONS  (PRN):  acetaminophen     Tablet .. 650 milliGRAM(s) Oral every 6 hours PRN Temp greater or equal to 38C (100.4F), Mild Pain (1 - 3)  aluminum hydroxide/magnesium hydroxide/simethicone Suspension 30 milliLiter(s) Oral every 4 hours PRN Dyspepsia  bisacodyl Suppository 10 milliGRAM(s) Rectal daily PRN Constipation  melatonin 3 milliGRAM(s) Oral at bedtime PRN Insomnia  ondansetron Injectable 4 milliGRAM(s) IV Push every 8 hours PRN Nausea and/or Vomiting      Allergies    No Known Allergies        Vital Signs Last 24 Hrs  T(C): 36.7 (02 Apr 2025 11:26), Max: 36.9 (01 Apr 2025 14:05)  T(F): 98 (02 Apr 2025 11:26), Max: 98.5 (01 Apr 2025 14:05)  HR: 58 (02 Apr 2025 11:26) (58 - 71)  BP: 116/78 (02 Apr 2025 11:26) (112/66 - 147/73)  BP(mean): --  RR: 19 (02 Apr 2025 11:26) (18 - 19)  SpO2: 98% (02 Apr 2025 11:26) (94% - 98%)    Parameters below as of 02 Apr 2025 11:26  Patient On (Oxygen Delivery Method): room air      T(C): 37.2 (01 Apr 2025 11:15), Max: 37.2 (31 Mar 2025 19:43)  T(F): 99 (01 Apr 2025 11:15), Max: 99 (31 Mar 2025 19:43)  HR: 60 (01 Apr 2025 11:15) (60 - 86)  BP: 126/57 (01 Apr 2025 11:15) (106/60 - 150/65)  BP(mean): --  RR: 18 (01 Apr 2025 11:15) (17 - 18)  SpO2: 98% (01 Apr 2025 11:15) (96% - 99%)    Parameters below as of 01 Apr 2025 11:15  Patient On (Oxygen Delivery Method): room air        PHYSICAL EXAM:    GENERAL: OOB in  chair, constipated  HEAD: L nasal tampon  NECK: Supple, No JVD  NERVOUS SYSTEM:  Alert & Oriented    CHEST/LUNG: No 02, no wheezes   HEART: No rub ; + systolic  murmur  ABDOMEN: Soft, Nontender, +BS  EXTREMITIES:  legs  better  : Neg     LABS:    04-02    144  |  110[H]  |  53.7[H]  ----------------------------<  102[H]  4.0   |  19.0[L]  |  4.00[H]    Ca    7.7[L]      02 Apr 2025 05:17  Phos  3.9     04-01  Mg     2.3     04-02                            7.8    6.78  )-----------( 134      ( 01 Apr 2025 06:00 )             24.5     04-01    145  |  111[H]  |  59.1[H]  ----------------------------<  129[H]  3.5   |  20.0[L]  |  4.57[H]    Ca    7.5[L]      01 Apr 2025 06:00  Phos  3.9     04-01  Mg     2.3     04-01        Urinalysis Basic - ( 01 Apr 2025 06:00 )    Color: x / Appearance: x / SG: x / pH: x  Gluc: 129 mg/dL / Ketone: x  / Bili: x / Urobili: x   Blood: x / Protein: x / Nitrite: x   Leuk Esterase: x / RBC: x / WBC x   Sq Epi: x / Non Sq Epi: x / Bacteria: x      Magnesium: 2.3 mg/dL (04-01 @ 06:00)  Phosphorus: 3.9 mg/dL (04-01 @ 06:00)          RADIOLOGY & ADDITIONAL TESTS:

## 2025-04-02 NOTE — PROGRESS NOTE ADULT - ASSESSMENT
73 y/o M w/ a hx of HFrEF, Paroxysmal Afib on AC, CKD3b, HTN, HLD, BPH here for mgmt of Acute On Chronic HFrEF and Epistaxis    #Anemia blood loss   - due to epistaxis   - melena likely due to above   - GI consulted no plan for EGD   - monitor HB stable   - s/p 2u PRBC 3/26 overnight   - plan for additional 1/2 unit 4/1    #ANGEL on CKD3b -- worsening   - hold diuretics   - caution w/ nephrotoxic agents  - BMP in am     #Acute On Chronic HFrEF  - DC lasix   - resume torsemide if NA and cr improves   - cardiology input appreciated   - dc Meto, changed to Coreg BID    #Paroxysmal Atrial Fibrillation on AC  - Dc eliquis per cardio   - outpatient follow up for watchman   - cont amiodarone 100mg Q24  - plan for outpatient LAAO     #Epistaxis  - HOLD AC as above  - ENT consulted s/p rocket removal  - dc empiric Augmentin     #BPH  - flomax    #Skin rash fungal skin infection   - cont nystatin powder     Dispo: DC pending renal recovery and Hg stabilization   lives alone    75 y/o M w/ a hx of HFrEF, Paroxysmal Afib on AC, CKD3b, HTN, HLD, BPH here for mgmt of Acute On Chronic HFrEF and Epistaxis    #Anemia blood loss   - due to epistaxis   - melena likely due to above   - GI consulted no plan for EGD   - monitor HB stable   - s/p 3u PRBC   - No signs of overt Gi bleed 4/2    #ANGEL on CKD3b -- slightly improving   - hold diuretics   - caution w/ nephrotoxic agents  - BMP in am     #Acute On Chronic HFrEF  - hold lasix   - resume torsemide if NA and cr improves   - cardiology input appreciated   - dc Meto, changed to Coreg BID    #Paroxysmal Atrial Fibrillation on AC  - Dc eliquis per cardio   - outpatient follow up for watchman   - cont amiodarone 100mg Q24  - plan for outpatient LAAO     #Epistaxis  - HOLD AC as above  - ENT consulted s/p rocket removal  - dc empiric Augmentin     #BPH  - flomax    #Skin rash fungal skin infection   - cont nystatin powder     Dispo: DC pending renal recovery   PT consult appreciated

## 2025-04-03 ENCOUNTER — TRANSCRIPTION ENCOUNTER (OUTPATIENT)
Age: 75
End: 2025-04-03

## 2025-04-03 LAB
ANION GAP SERPL CALC-SCNC: 14 MMOL/L — SIGNIFICANT CHANGE UP (ref 5–17)
BUN SERPL-MCNC: 53.2 MG/DL — HIGH (ref 8–20)
CALCIUM SERPL-MCNC: 7.6 MG/DL — LOW (ref 8.4–10.5)
CHLORIDE SERPL-SCNC: 110 MMOL/L — HIGH (ref 96–108)
CO2 SERPL-SCNC: 21 MMOL/L — LOW (ref 22–29)
CREAT SERPL-MCNC: 3.96 MG/DL — HIGH (ref 0.5–1.3)
EGFR: 15 ML/MIN/1.73M2 — LOW
EGFR: 15 ML/MIN/1.73M2 — LOW
GLUCOSE SERPL-MCNC: 97 MG/DL — SIGNIFICANT CHANGE UP (ref 70–99)
HCT VFR BLD CALC: 25.8 % — LOW (ref 39–50)
HGB BLD-MCNC: 8.3 G/DL — LOW (ref 13–17)
MCHC RBC-ENTMCNC: 32.2 G/DL — SIGNIFICANT CHANGE UP (ref 32–36)
MCHC RBC-ENTMCNC: 32.7 PG — SIGNIFICANT CHANGE UP (ref 27–34)
MCV RBC AUTO: 101.6 FL — HIGH (ref 80–100)
NRBC # BLD AUTO: 0 K/UL — SIGNIFICANT CHANGE UP (ref 0–0)
NRBC # FLD: 0 K/UL — SIGNIFICANT CHANGE UP (ref 0–0)
NRBC BLD AUTO-RTO: 0 /100 WBCS — SIGNIFICANT CHANGE UP (ref 0–0)
PHOSPHATE SERPL-MCNC: 3.8 MG/DL — SIGNIFICANT CHANGE UP (ref 2.4–4.7)
PLATELET # BLD AUTO: 153 K/UL — SIGNIFICANT CHANGE UP (ref 150–400)
PMV BLD: 10.6 FL — SIGNIFICANT CHANGE UP (ref 7–13)
POTASSIUM SERPL-MCNC: 4.2 MMOL/L — SIGNIFICANT CHANGE UP (ref 3.5–5.3)
POTASSIUM SERPL-SCNC: 4.2 MMOL/L — SIGNIFICANT CHANGE UP (ref 3.5–5.3)
RBC # BLD: 2.54 M/UL — LOW (ref 4.2–5.8)
RBC # FLD: 16 % — HIGH (ref 10.3–14.5)
SODIUM SERPL-SCNC: 145 MMOL/L — SIGNIFICANT CHANGE UP (ref 135–145)
WBC # BLD: 5.5 K/UL — SIGNIFICANT CHANGE UP (ref 3.8–10.5)
WBC # FLD AUTO: 5.5 K/UL — SIGNIFICANT CHANGE UP (ref 3.8–10.5)

## 2025-04-03 PROCEDURE — 99232 SBSQ HOSP IP/OBS MODERATE 35: CPT

## 2025-04-03 RX ORDER — SODIUM BICARBONATE 1 MEQ/ML
1300 SYRINGE (ML) INTRAVENOUS THREE TIMES A DAY
Refills: 0 | Status: DISCONTINUED | OUTPATIENT
Start: 2025-04-03 | End: 2025-04-04

## 2025-04-03 RX ORDER — FUROSEMIDE 10 MG/ML
40 INJECTION INTRAMUSCULAR; INTRAVENOUS EVERY 12 HOURS
Refills: 0 | Status: DISCONTINUED | OUTPATIENT
Start: 2025-04-03 | End: 2025-04-03

## 2025-04-03 RX ORDER — FUROSEMIDE 10 MG/ML
40 INJECTION INTRAMUSCULAR; INTRAVENOUS DAILY
Refills: 0 | Status: DISCONTINUED | OUTPATIENT
Start: 2025-04-04 | End: 2025-04-04

## 2025-04-03 RX ADMIN — NYSTATIN 1 APPLICATION(S): 100000 CREAM TOPICAL at 17:42

## 2025-04-03 RX ADMIN — Medication 40 MILLIGRAM(S): at 05:39

## 2025-04-03 RX ADMIN — IRON SUCROSE 220 MILLIGRAM(S): 20 INJECTION, SOLUTION INTRAVENOUS at 17:41

## 2025-04-03 RX ADMIN — Medication 1300 MILLIGRAM(S): at 21:18

## 2025-04-03 RX ADMIN — Medication 1 SPRAY(S): at 12:11

## 2025-04-03 RX ADMIN — Medication 2 TABLET(S): at 21:17

## 2025-04-03 RX ADMIN — Medication 1300 MILLIGRAM(S): at 05:40

## 2025-04-03 RX ADMIN — CARVEDILOL 3.12 MILLIGRAM(S): 3.12 TABLET, FILM COATED ORAL at 17:42

## 2025-04-03 RX ADMIN — ATORVASTATIN CALCIUM 20 MILLIGRAM(S): 80 TABLET, FILM COATED ORAL at 21:17

## 2025-04-03 RX ADMIN — Medication 1300 MILLIGRAM(S): at 12:10

## 2025-04-03 RX ADMIN — CYANOCOBALAMIN 1000 MICROGRAM(S): 1000 INJECTION INTRAMUSCULAR; SUBCUTANEOUS at 12:09

## 2025-04-03 RX ADMIN — Medication 1 SPRAY(S): at 23:10

## 2025-04-03 RX ADMIN — Medication 40 MILLIGRAM(S): at 17:41

## 2025-04-03 RX ADMIN — NYSTATIN 1 APPLICATION(S): 100000 CREAM TOPICAL at 05:40

## 2025-04-03 RX ADMIN — Medication 100 MILLIGRAM(S): at 12:09

## 2025-04-03 RX ADMIN — Medication 1 SPRAY(S): at 05:40

## 2025-04-03 RX ADMIN — TAMSULOSIN HYDROCHLORIDE 0.4 MILLIGRAM(S): 0.4 CAPSULE ORAL at 21:19

## 2025-04-03 RX ADMIN — AMIODARONE HYDROCHLORIDE 100 MILLIGRAM(S): 50 INJECTION, SOLUTION INTRAVENOUS at 05:39

## 2025-04-03 RX ADMIN — CARVEDILOL 3.12 MILLIGRAM(S): 3.12 TABLET, FILM COATED ORAL at 05:39

## 2025-04-03 RX ADMIN — FOLIC ACID 1 MILLIGRAM(S): 1 TABLET ORAL at 12:09

## 2025-04-03 RX ADMIN — Medication 1 SPRAY(S): at 17:42

## 2025-04-03 NOTE — PHYSICAL THERAPY INITIAL EVALUATION ADULT - ADDITIONAL COMMENTS
pt reports living in private home, alone, brother local able to assist prn if needed. 6 ANABEL with handrail and 6 stairs inside up/down with handrail. Independent prior to admission with use of SAC. Reports he often furniture walks at home. Owns shower chair.

## 2025-04-03 NOTE — PHYSICAL THERAPY INITIAL EVALUATION ADULT - LEVEL OF INDEPENDENCE: GAIT, REHAB EVAL
CGA to Lyudmila with SAC, CGA progressing to supervision with use of RW. RW deemed most appropriate at this time

## 2025-04-03 NOTE — DISCHARGE NOTE NURSING/CASE MANAGEMENT/SOCIAL WORK - PATIENT PORTAL LINK FT
You can access the FollowMyHealth Patient Portal offered by [facility] by registering at the following website: [email or web address]. By joining [facility]’s FollowMyHealth portal, you will also be able to view your health information using other applications (apps) compatible with our system.

## 2025-04-03 NOTE — PHYSICAL THERAPY INITIAL EVALUATION ADULT - PERTINENT HX OF CURRENT PROBLEM, REHAB EVAL
73 y/o M w/ a hx of HFrEF, Paroxysmal Afib on AC, CKD3b, HTN, HLD, BPH here for mgmt of Acute On Chronic HFrEF and Epistaxis

## 2025-04-03 NOTE — PROGRESS NOTE ADULT - SUBJECTIVE AND OBJECTIVE BOX
NEPHROLOGY INTERVAL HPI/OVERNIGHT EVENTS:    No new events.    MEDICATIONS  (STANDING):  allopurinol 100 milliGRAM(s) Oral daily  aMIOdarone    Tablet 100 milliGRAM(s) Oral daily  atorvastatin 20 milliGRAM(s) Oral at bedtime  carvedilol 3.125 milliGRAM(s) Oral every 12 hours  cyanocobalamin 1000 MICROGram(s) Oral daily  epoetin esvin-epbx (RETACRIT) Injectable 37684 Unit(s) SubCutaneous every 7 days  ergocalciferol 99948 Unit(s) Oral every week  folic acid 1 milliGRAM(s) Oral daily  influenza  Vaccine (HIGH DOSE) 0.5 milliLiter(s) IntraMuscular once  nystatin Powder 1 Application(s) Topical two times a day  pantoprazole  Injectable 40 milliGRAM(s) IV Push every 12 hours  polyethylene glycol 3350 17 Gram(s) Oral daily  senna 2 Tablet(s) Oral at bedtime  sodium bicarbonate 650 milliGRAM(s) Oral two times a day  sodium chloride 0.65% Nasal 1 Spray(s) Both Nostrils every 6 hours  tamsulosin 0.4 milliGRAM(s) Oral at bedtime    MEDICATIONS  (PRN):  acetaminophen     Tablet .. 650 milliGRAM(s) Oral every 6 hours PRN Temp greater or equal to 38C (100.4F), Mild Pain (1 - 3)  aluminum hydroxide/magnesium hydroxide/simethicone Suspension 30 milliLiter(s) Oral every 4 hours PRN Dyspepsia  bisacodyl Suppository 10 milliGRAM(s) Rectal daily PRN Constipation  melatonin 3 milliGRAM(s) Oral at bedtime PRN Insomnia  ondansetron Injectable 4 milliGRAM(s) IV Push every 8 hours PRN Nausea and/or Vomiting      Allergies    No Known Allergies        Vital Signs Last 24 Hrs  T(C): 36.4 (03 Apr 2025 08:16), Max: 36.7 (02 Apr 2025 11:26)  T(F): 97.6 (03 Apr 2025 08:16), Max: 98.1 (03 Apr 2025 05:32)  HR: 61 (03 Apr 2025 08:16) (55 - 70)  BP: 121/60 (03 Apr 2025 08:16) (116/62 - 138/78)  BP(mean): --  RR: 17 (03 Apr 2025 08:16) (17 - 19)  SpO2: 99% (03 Apr 2025 08:16) (96% - 99%)    Parameters below as of 03 Apr 2025 08:16  Patient On (Oxygen Delivery Method): room air      T(C): 36.7 (02 Apr 2025 11:26), Max: 36.9 (01 Apr 2025 14:05)  T(F): 98 (02 Apr 2025 11:26), Max: 98.5 (01 Apr 2025 14:05)  HR: 58 (02 Apr 2025 11:26) (58 - 71)  BP: 116/78 (02 Apr 2025 11:26) (112/66 - 147/73)  BP(mean): --  RR: 19 (02 Apr 2025 11:26) (18 - 19)  SpO2: 98% (02 Apr 2025 11:26) (94% - 98%)    Parameters below as of 02 Apr 2025 11:26  Patient On (Oxygen Delivery Method): room air      T(C): 37.2 (01 Apr 2025 11:15), Max: 37.2 (31 Mar 2025 19:43)  T(F): 99 (01 Apr 2025 11:15), Max: 99 (31 Mar 2025 19:43)  HR: 60 (01 Apr 2025 11:15) (60 - 86)  BP: 126/57 (01 Apr 2025 11:15) (106/60 - 150/65)  BP(mean): --  RR: 18 (01 Apr 2025 11:15) (17 - 18)  SpO2: 98% (01 Apr 2025 11:15) (96% - 99%)    Parameters below as of 01 Apr 2025 11:15  Patient On (Oxygen Delivery Method): room air        PHYSICAL EXAM:    GENERAL: OOB in  chair, brother at bedside  HEAD: L nasal tampon  NECK: Supple, No JVD  NERVOUS SYSTEM:  Alert & Oriented    CHEST/LUNG: No 02, no wheezes   HEART: No rub ; + systolic  murmur  ABDOMEN: Soft, Nontender, +BS , constipation better  EXTREMITIES:  legs with chronic  stasis   changes   : Neg     LABS:    04-03    145  |  110[H]  |  53.2[H]  ----------------------------<  97  4.2   |  21.0[L]  |  3.96[H]    Ca    7.6[L]      03 Apr 2025 06:15  Phos  3.8     04-03  Mg     2.3     04-02        04-02    144  |  110[H]  |  53.7[H]  ----------------------------<  102[H]  4.0   |  19.0[L]  |  4.00[H]    Ca    7.7[L]      02 Apr 2025 05:17  Phos  3.9     04-01  Mg     2.3     04-02                            7.8    6.78  )-----------( 134      ( 01 Apr 2025 06:00 )             24.5     04-01    145  |  111[H]  |  59.1[H]  ----------------------------<  129[H]  3.5   |  20.0[L]  |  4.57[H]    Ca    7.5[L]      01 Apr 2025 06:00  Phos  3.9     04-01  Mg     2.3     04-01        Urinalysis Basic - ( 01 Apr 2025 06:00 )    Color: x / Appearance: x / SG: x / pH: x  Gluc: 129 mg/dL / Ketone: x  / Bili: x / Urobili: x   Blood: x / Protein: x / Nitrite: x   Leuk Esterase: x / RBC: x / WBC x   Sq Epi: x / Non Sq Epi: x / Bacteria: x      Magnesium: 2.3 mg/dL (04-01 @ 06:00)  Phosphorus: 3.9 mg/dL (04-01 @ 06:00)          RADIOLOGY & ADDITIONAL TESTS:

## 2025-04-03 NOTE — PROGRESS NOTE ADULT - ASSESSMENT
73 y/o M w/ a hx of HFrEF, Paroxysmal Afib on AC, CKD3b, HTN, HLD, BPH here for mgmt of Acute On Chronic HFrEF and Epistaxis    #Anemia blood loss   - due to epistaxis   - melena likely due to above   - GI consulted no plan for EGD   - monitor HB - stable   - s/p 3u PRBC   - No signs of overt GI bleed 4/2    #ANGEL on CKD3b -- slightly improving   - hold diuretics, fu renal outpt for resumption of diuretics   - caution w/ nephrotoxic agents  - BMP in am     #Acute On Chronic HFrEF  - hold lasix   - cardiology input appreciated   - dc Meto, changed to Coreg BID    #Paroxysmal Atrial Fibrillation on AC  - Dc eliquis per cardio   - outpatient follow up for watchman   - cont amiodarone 100mg Q24  - plan for outpatient LAAO     #Epistaxis  - HOLD AC as above  - ENT consulted s/p rocket removal  - dc empiric Augmentin     #BPH  - flomax    #Skin rash fungal skin infection   - cont nystatin powder     Dispo: DC pending renal clearance and PT eval  PT consult appreciated

## 2025-04-03 NOTE — CHART NOTE - NSCHARTNOTEFT_GEN_A_CORE
Patient requires rolling walker due to Heart Failure for safe ambulation at discharge. Patient requires rolling walker due to  heart failure for safe ambulation at discharge to help complete MRADLs.

## 2025-04-03 NOTE — PROGRESS NOTE ADULT - ASSESSMENT
A) CRF ; TAVR; Poor  EF  with pulmonary hypertension ; Anemia  with low  iron.    P) meds  same, ambulate.

## 2025-04-03 NOTE — PROGRESS NOTE ADULT - SUBJECTIVE AND OBJECTIVE BOX
Brigida Greene M.D.    Patient is a 74y old  Male who presents with a chief complaint of Acute On Chronic HFrEF (02 Apr 2025 13:16)      SUBJECTIVE / OVERNIGHT EVENTS: no event overnight.     Patient denies chest pain, SOB, abd pain, N/V, fever, chills, dysuria or any other complaints. All remainder ROS negative.     MEDICATIONS  (STANDING):  allopurinol 100 milliGRAM(s) Oral daily  aMIOdarone    Tablet 100 milliGRAM(s) Oral daily  atorvastatin 20 milliGRAM(s) Oral at bedtime  carvedilol 3.125 milliGRAM(s) Oral every 12 hours  cyanocobalamin 1000 MICROGram(s) Oral daily  epoetin esvin-epbx (RETACRIT) Injectable 53180 Unit(s) SubCutaneous every 7 days  ergocalciferol 19928 Unit(s) Oral every week  folic acid 1 milliGRAM(s) Oral daily  influenza  Vaccine (HIGH DOSE) 0.5 milliLiter(s) IntraMuscular once  iron sucrose IVPB 200 milliGRAM(s) IV Intermittent every 24 hours  nystatin Powder 1 Application(s) Topical two times a day  pantoprazole  Injectable 40 milliGRAM(s) IV Push every 12 hours  polyethylene glycol 3350 17 Gram(s) Oral daily  senna 2 Tablet(s) Oral at bedtime  sodium bicarbonate 1300 milliGRAM(s) Oral three times a day  sodium chloride 0.65% Nasal 1 Spray(s) Both Nostrils every 6 hours  tamsulosin 0.4 milliGRAM(s) Oral at bedtime    MEDICATIONS  (PRN):  acetaminophen     Tablet .. 650 milliGRAM(s) Oral every 6 hours PRN Temp greater or equal to 38C (100.4F), Mild Pain (1 - 3)  aluminum hydroxide/magnesium hydroxide/simethicone Suspension 30 milliLiter(s) Oral every 4 hours PRN Dyspepsia  bisacodyl Suppository 10 milliGRAM(s) Rectal daily PRN Constipation  melatonin 3 milliGRAM(s) Oral at bedtime PRN Insomnia  ondansetron Injectable 4 milliGRAM(s) IV Push every 8 hours PRN Nausea and/or Vomiting      I&O's Summary    02 Apr 2025 07:01  -  03 Apr 2025 07:00  --------------------------------------------------------  IN: 930 mL / OUT: 1075 mL / NET: -145 mL        PHYSICAL EXAM:  Vital Signs Last 24 Hrs  T(C): 36.4 (03 Apr 2025 08:16), Max: 36.7 (02 Apr 2025 11:26)  T(F): 97.6 (03 Apr 2025 08:16), Max: 98.1 (03 Apr 2025 05:32)  HR: 61 (03 Apr 2025 08:16) (55 - 70)  BP: 121/60 (03 Apr 2025 08:16) (116/62 - 138/78)  BP(mean): --  RR: 17 (03 Apr 2025 08:16) (17 - 19)  SpO2: 99% (03 Apr 2025 08:16) (96% - 99%)    Parameters below as of 03 Apr 2025 08:16  Patient On (Oxygen Delivery Method): room air    CONSTITUTIONAL: NAD, well-groomed, sitting up in chair   RESPIRATORY: Normal respiratory effort; no increased WOB  CARDIOVASCULAR: Regular rate and rhythm, no LE edema  ABDOMEN: Nontender to palpation, normoactive bowel sounds    LABS:                        8.3    5.50  )-----------( 153      ( 03 Apr 2025 06:15 )             25.8     04-03    145  |  110[H]  |  53.2[H]  ----------------------------<  97  4.2   |  21.0[L]  |  3.96[H]    Ca    7.6[L]      03 Apr 2025 06:15  Phos  3.8     04-03  Mg     2.3     04-02            Urinalysis Basic - ( 03 Apr 2025 06:15 )    Color: x / Appearance: x / SG: x / pH: x  Gluc: 97 mg/dL / Ketone: x  / Bili: x / Urobili: x   Blood: x / Protein: x / Nitrite: x   Leuk Esterase: x / RBC: x / WBC x   Sq Epi: x / Non Sq Epi: x / Bacteria: x        CAPILLARY BLOOD GLUCOSE          RADIOLOGY & ADDITIONAL TESTS:  Results Reviewed:   Imaging Personally Reviewed:  Electrocardiogram Personally Reviewed:

## 2025-04-03 NOTE — DISCHARGE NOTE NURSING/CASE MANAGEMENT/SOCIAL WORK - FINANCIAL ASSISTANCE
[facility] provides services at a reduced cost to those who are determined to be eligible through [facility]’s financial assistance program. Information regarding [facility]’s financial assistance program can be found by going to [email or web address] or by calling [phone].

## 2025-04-03 NOTE — DISCHARGE NOTE NURSING/CASE MANAGEMENT/SOCIAL WORK - NSDCFUADDAPPT_GEN_ALL_CORE_FT
Pcp- - waiting for call back        Cardio- - office will set up   Pt declined Vivo meds to bed.  Pt agreeable to [facility]  Pt agreeable to outpt F/U appts.  Star yellow folder provided. Pcp- - Pcp- Appointment made with Dr. Asencio on 4/10 at 2:50, [facility]  .  If you are unable to attend your pre-scheduled appointment, please contact the office directly at [phone] to reschedule.      Cardio- - office will set up   Pt declined Vivo meds to bed.  Pt agreeable to [facility]  Pt agreeable to outpt F/U appts.  Star yellow folder provided.

## 2025-04-03 NOTE — PHYSICAL THERAPY INITIAL EVALUATION ADULT - GENERAL OBSERVATIONS, REHAB EVAL
Pt received in chair, + IV Loc, +Tele, + brother present, breathing on RA in NAD, in 0/10 pain, agreeable to PT evaluation

## 2025-04-03 NOTE — DISCHARGE NOTE NURSING/CASE MANAGEMENT/SOCIAL WORK - NSDCPEFALRISK_GEN_ALL_CORE
For information on Fall & Injury Prevention, visit: [email or web address] OR  [email or web address] OR  https://www.cdc.gov/steadi/patient.html

## 2025-04-04 ENCOUNTER — TRANSCRIPTION ENCOUNTER (OUTPATIENT)
Age: 75
End: 2025-04-04

## 2025-04-04 VITALS
TEMPERATURE: 98 F | HEART RATE: 56 BPM | SYSTOLIC BLOOD PRESSURE: 130 MMHG | OXYGEN SATURATION: 95 % | RESPIRATION RATE: 18 BRPM | DIASTOLIC BLOOD PRESSURE: 70 MMHG

## 2025-04-04 LAB
ANION GAP SERPL CALC-SCNC: 14 MMOL/L — SIGNIFICANT CHANGE UP (ref 5–17)
BUN SERPL-MCNC: 55 MG/DL — HIGH (ref 8–20)
CALCIUM SERPL-MCNC: 8.2 MG/DL — LOW (ref 8.4–10.5)
CHLORIDE SERPL-SCNC: 107 MMOL/L — SIGNIFICANT CHANGE UP (ref 96–108)
CO2 SERPL-SCNC: 22 MMOL/L — SIGNIFICANT CHANGE UP (ref 22–29)
CREAT SERPL-MCNC: 3.9 MG/DL — HIGH (ref 0.5–1.3)
EGFR: 16 ML/MIN/1.73M2 — LOW
EGFR: 16 ML/MIN/1.73M2 — LOW
GLUCOSE SERPL-MCNC: 84 MG/DL — SIGNIFICANT CHANGE UP (ref 70–99)
POTASSIUM SERPL-MCNC: 4.6 MMOL/L — SIGNIFICANT CHANGE UP (ref 3.5–5.3)
POTASSIUM SERPL-SCNC: 4.6 MMOL/L — SIGNIFICANT CHANGE UP (ref 3.5–5.3)
SODIUM SERPL-SCNC: 143 MMOL/L — SIGNIFICANT CHANGE UP (ref 135–145)

## 2025-04-04 PROCEDURE — 84100 ASSAY OF PHOSPHORUS: CPT

## 2025-04-04 PROCEDURE — 86901 BLOOD TYPING SEROLOGIC RH(D): CPT

## 2025-04-04 PROCEDURE — 85610 PROTHROMBIN TIME: CPT

## 2025-04-04 PROCEDURE — 93005 ELECTROCARDIOGRAM TRACING: CPT

## 2025-04-04 PROCEDURE — P9016: CPT

## 2025-04-04 PROCEDURE — 84484 ASSAY OF TROPONIN QUANT: CPT

## 2025-04-04 PROCEDURE — 99285 EMERGENCY DEPT VISIT HI MDM: CPT

## 2025-04-04 PROCEDURE — 86985 SPLIT BLOOD OR PRODUCTS: CPT

## 2025-04-04 PROCEDURE — P9040: CPT

## 2025-04-04 PROCEDURE — 76775 US EXAM ABDO BACK WALL LIM: CPT

## 2025-04-04 PROCEDURE — 80053 COMPREHEN METABOLIC PANEL: CPT

## 2025-04-04 PROCEDURE — 80061 LIPID PANEL: CPT

## 2025-04-04 PROCEDURE — 82728 ASSAY OF FERRITIN: CPT

## 2025-04-04 PROCEDURE — 86900 BLOOD TYPING SEROLOGIC ABO: CPT

## 2025-04-04 PROCEDURE — 83735 ASSAY OF MAGNESIUM: CPT

## 2025-04-04 PROCEDURE — 87637 SARSCOV2&INF A&B&RSV AMP PRB: CPT

## 2025-04-04 PROCEDURE — 82746 ASSAY OF FOLIC ACID SERUM: CPT

## 2025-04-04 PROCEDURE — 71045 X-RAY EXAM CHEST 1 VIEW: CPT

## 2025-04-04 PROCEDURE — 83880 ASSAY OF NATRIURETIC PEPTIDE: CPT

## 2025-04-04 PROCEDURE — 83550 IRON BINDING TEST: CPT

## 2025-04-04 PROCEDURE — C8929: CPT

## 2025-04-04 PROCEDURE — 85025 COMPLETE CBC W/AUTO DIFF WBC: CPT

## 2025-04-04 PROCEDURE — 83970 ASSAY OF PARATHORMONE: CPT

## 2025-04-04 PROCEDURE — 82306 VITAMIN D 25 HYDROXY: CPT

## 2025-04-04 PROCEDURE — 83540 ASSAY OF IRON: CPT

## 2025-04-04 PROCEDURE — 99239 HOSP IP/OBS DSCHRG MGMT >30: CPT

## 2025-04-04 PROCEDURE — P9011: CPT

## 2025-04-04 PROCEDURE — 71046 X-RAY EXAM CHEST 2 VIEWS: CPT

## 2025-04-04 PROCEDURE — 83036 HEMOGLOBIN GLYCOSYLATED A1C: CPT

## 2025-04-04 PROCEDURE — 84466 ASSAY OF TRANSFERRIN: CPT

## 2025-04-04 PROCEDURE — 82310 ASSAY OF CALCIUM: CPT

## 2025-04-04 PROCEDURE — 86923 COMPATIBILITY TEST ELECTRIC: CPT

## 2025-04-04 PROCEDURE — 36430 TRANSFUSION BLD/BLD COMPNT: CPT

## 2025-04-04 PROCEDURE — 36415 COLL VENOUS BLD VENIPUNCTURE: CPT

## 2025-04-04 PROCEDURE — 85027 COMPLETE CBC AUTOMATED: CPT

## 2025-04-04 PROCEDURE — 82607 VITAMIN B-12: CPT

## 2025-04-04 PROCEDURE — 86850 RBC ANTIBODY SCREEN: CPT

## 2025-04-04 PROCEDURE — 85730 THROMBOPLASTIN TIME PARTIAL: CPT

## 2025-04-04 PROCEDURE — 80048 BASIC METABOLIC PNL TOTAL CA: CPT

## 2025-04-04 RX ORDER — CARVEDILOL 3.12 MG/1
1 TABLET, FILM COATED ORAL
Qty: 60 | Refills: 0
Start: 2025-04-04 | End: 2025-05-03

## 2025-04-04 RX ORDER — SPIRONOLACTONE 25 MG
1 TABLET ORAL
Refills: 0 | DISCHARGE

## 2025-04-04 RX ORDER — METOPROLOL SUCCINATE 50 MG/1
1 TABLET, EXTENDED RELEASE ORAL
Refills: 0 | DISCHARGE

## 2025-04-04 RX ADMIN — AMIODARONE HYDROCHLORIDE 100 MILLIGRAM(S): 50 INJECTION, SOLUTION INTRAVENOUS at 05:45

## 2025-04-04 RX ADMIN — Medication 100 MILLIGRAM(S): at 12:28

## 2025-04-04 RX ADMIN — CYANOCOBALAMIN 1000 MICROGRAM(S): 1000 INJECTION INTRAMUSCULAR; SUBCUTANEOUS at 12:29

## 2025-04-04 RX ADMIN — Medication 1300 MILLIGRAM(S): at 05:44

## 2025-04-04 RX ADMIN — Medication 1 SPRAY(S): at 12:28

## 2025-04-04 RX ADMIN — FOLIC ACID 1 MILLIGRAM(S): 1 TABLET ORAL at 12:28

## 2025-04-04 RX ADMIN — Medication 1 SPRAY(S): at 05:45

## 2025-04-04 RX ADMIN — NYSTATIN 1 APPLICATION(S): 100000 CREAM TOPICAL at 05:45

## 2025-04-04 RX ADMIN — Medication 40 MILLIGRAM(S): at 06:36

## 2025-04-04 RX ADMIN — FUROSEMIDE 40 MILLIGRAM(S): 10 INJECTION INTRAMUSCULAR; INTRAVENOUS at 05:44

## 2025-04-04 NOTE — DISCHARGE NOTE PROVIDER - NSDCFUADDAPPT_GEN_ALL_CORE_FT
Pcp- - Pcp- Appointment made with Dr. Asencio on 4/10 at 2:50, [facility]  .  If you are unable to attend your pre-scheduled appointment, please contact the office directly at [phone] to reschedule.      Cardio- - office will set up   Pt declined Vivo meds to bed.  Pt agreeable to [facility]  Pt agreeable to outpt F/U appts.  Star yellow folder provided.

## 2025-04-04 NOTE — PROGRESS NOTE ADULT - ASSESSMENT
CKD(IIIb): Screat 2.7mg/dL in recent past  Now ANGEL in setting of decompensated CHF/diuretics and poor BP control vs progression of disease vs both  Creat 4.57 > 3.90  on lower dose lasix - may need to reduce further as has chr Lymohedema, on pump  Follows with cardiology    - avoid potential nephrotoxins  - renal sono w/o hydro 3/28   - Resp status improved ---> Lasix held   - Component of elevated BUN due to Epistaxis   - Follow bladder scan     HTN - Watch on meds     Anemia: +CKD + GI loss  - Iron stores OK   - added Retacrit and folate   - PRBCs as needed  - GI eval    RO:  -  Vit D 28 and iPTH 244  - Added weekly ergo

## 2025-04-04 NOTE — DISCHARGE NOTE PROVIDER - CARE PROVIDER_API CALL
Richar Heard.  Nephrology  [address] [place] [address]  Phone: [phone]  Fax: [phone]  Follow Up Time:     Itz Goyal  Cardiovascular Disease  [address] [place] [id number]  Phone: [phone]  Fax: [phone]  Follow Up Time:

## 2025-04-04 NOTE — DISCHARGE NOTE PROVIDER - NSDCCPCAREPLAN_GEN_ALL_CORE_FT
PRINCIPAL DISCHARGE DIAGNOSIS  Diagnosis: Acute on chronic combined systolic and diastolic congestive heart failure  Assessment and Plan of Treatment:       SECONDARY DISCHARGE DIAGNOSES  Diagnosis: Epistaxis  Assessment and Plan of Treatment:     Diagnosis: Anemia  Assessment and Plan of Treatment:

## 2025-04-04 NOTE — DISCHARGE NOTE PROVIDER - ATTENDING DISCHARGE PHYSICAL EXAMINATION:
VITALS:   T(C): 36.4 (04-04-25 @ 07:59), Max: 36.8 (04-03-25 @ 22:35)  HR: 56 (04-04-25 @ 07:59) (52 - 58)  BP: 130/70 (04-04-25 @ 07:59) (108/58 - 143/63)  RR: 18 (04-04-25 @ 07:59) (17 - 19)  SpO2: 95% (04-04-25 @ 07:59) (95% - 97%)    CONSTITUTIONAL: NAD, well-groomed  RESPIRATORY: Normal respiratory effort; no increased WOB  CARDIOVASCULAR: Regular rate and rhythm, no LE edema  ABDOMEN: Nontender to palpation, normoactive bowel sounds

## 2025-04-04 NOTE — DISCHARGE NOTE PROVIDER - HOSPITAL COURSE
75 y/o M w/ a hx of HFrEF, Paroxysmal Afib on AC, CKD3b, HTN, HLD, BPH here for mgmt of Acute On Chronic HFrEF and Epistaxis/ Admitted initially for Acute On Chronic HFrEF, s/p diuresis and meto changed to Coreg BID. Course cb ANGEL on CKD, Scr slowly improved after holding diuretics, pt will followup with renal outpatient about when to resume diuretics. Course further cb Anemia 2/2 epistasix, s/p 3u prbc, sp nasal rocket which was removed by ENT. GI consulted no plan for EGD. Seen by cards no plan for AC, fu outpatient for watchman eval. Repeat Hg stable, now stable for discharge.

## 2025-04-04 NOTE — DISCHARGE NOTE PROVIDER - NSDCMRMEDTOKEN_GEN_ALL_CORE_FT
allopurinol 100 mg oral tablet: 1 tab(s) orally once a day  amiodarone 100 mg oral tablet: 1 tab(s) orally once a day  carvedilol 3.125 mg oral tablet: 1 tab(s) orally every 12 hours  folic acid 1 mg oral tablet: 1 tab(s) orally once a day  silver sulfADIAZINE 1% topical cream: 1 Apply topically to affected area once a day  simvastatin 40 mg oral tablet: 1 tab(s) orally once a day (at bedtime)  sodium bicarbonate 650 mg oral tablet: 1 tab(s) orally 2 times a day  tamsulosin 0.4 mg oral capsule: 1 cap(s) orally once a day (at bedtime)

## 2025-04-04 NOTE — PROGRESS NOTE ADULT - SUBJECTIVE AND OBJECTIVE BOX
Patient seen and examined    I&O's Summary    03 Apr 2025 07:01  -  04 Apr 2025 07:00  --------------------------------------------------------  IN: 0 mL / OUT: 600 mL / NET: -600 mL    04 Apr 2025 07:01  -  04 Apr 2025 15:43  --------------------------------------------------------  IN: 0 mL / OUT: 1200 mL / NET: -1200 mL        REVIEW OF SYSTEMS: OOb/Ch    CONSTITUTIONAL: No F/C  RESPIRATORY: No cough,  No SOB  CARDIOVASCULAR: No CP/palpitations,    GASTROINTESTINAL: No abdominal pain  or NVD   GENITOURINARY: No UTI sx  NEUROLOGICAL: No headaches, NO wk/numbness  MUSCULOSKELETAL:   EXTREMITIES : Chr LE swelling,    Vital Signs Last 24 Hrs  T(C): 36.4 (04 Apr 2025 07:59), Max: 36.8 (03 Apr 2025 22:35)  T(F): 97.6 (04 Apr 2025 07:59), Max: 98.2 (03 Apr 2025 22:35)  HR: 56 (04 Apr 2025 07:59) (54 - 58)  BP: 130/70 (04 Apr 2025 07:59) (122/62 - 143/63)  BP(mean): --  RR: 18 (04 Apr 2025 07:59) (17 - 18)  SpO2: 95% (04 Apr 2025 07:59) (95% - 96%)    Parameters below as of 04 Apr 2025 07:59  Patient On (Oxygen Delivery Method): room air        PHYSICAL EXAM:    GENERAL: NAD,   EYES:  conjunctiva and sclera clear  NECK: Supple, No JVD/Bruit  NERVOUS SYSTEM:  A/O x3,   CHEST:  No rales or rhonchi  HEART:  RRR, No murmur  ABDOMEN: Soft, NT/ND BS+  EXTREMITIES:  1-2+ poorly pitting Edema;  SKIN: No rashes    LABS:                          8.3    5.50  )-----------( 153      ( 03 Apr 2025 06:15 )             25.8     04-04    143  |  107  |  55.0[H]  ----------------------------<  84  4.6   |  22.0  |  3.90[H]    Ca    8.2[L]      04 Apr 2025 06:15  Phos  3.8     04-03        MEDICATIONS  (STANDING):  acetaminophen     Tablet .. PRN  allopurinol  aluminum hydroxide/magnesium hydroxide/simethicone Suspension PRN  aMIOdarone    Tablet  atorvastatin  bisacodyl Suppository PRN  carvedilol  cyanocobalamin  epoetin esvin-epbx (RETACRIT) Injectable  ergocalciferol  folic acid  furosemide    Tablet  influenza  Vaccine (HIGH DOSE)  melatonin PRN  nystatin Powder  ondansetron Injectable PRN  pantoprazole  Injectable  polyethylene glycol 3350  senna  sodium bicarbonate  sodium chloride 0.65% Nasal  tamsulosin

## 2025-04-04 NOTE — PROGRESS NOTE ADULT - REASON FOR ADMISSION
Acute On Chronic HFrEF

## 2025-04-04 NOTE — PROGRESS NOTE ADULT - PROVIDER SPECIALTY LIST ADULT
Cardiology
Cardiology
ENT
Hospitalist
Hospitalist
Nephrology
Nephrology
Cardiology
Hospitalist
Hospitalist
Nephrology
Hospitalist
Internal Medicine
Nephrology
ENT
Hospitalist
Hospitalist
Nephrology
ENT